# Patient Record
Sex: FEMALE | Race: WHITE | Employment: OTHER | URBAN - METROPOLITAN AREA
[De-identification: names, ages, dates, MRNs, and addresses within clinical notes are randomized per-mention and may not be internally consistent; named-entity substitution may affect disease eponyms.]

---

## 2017-07-10 ENCOUNTER — HOSPITAL ENCOUNTER (OUTPATIENT)
Dept: GENERAL RADIOLOGY | Age: 77
Discharge: HOME | End: 2017-07-10
Attending: INTERNAL MEDICINE | Admitting: INTERNAL MEDICINE

## 2017-07-10 DIAGNOSIS — J42 ACUTE EXACERBATION OF CHRONIC BRONCHITIS (HCC): ICD-10-CM

## 2017-07-10 DIAGNOSIS — J20.9 ACUTE EXACERBATION OF CHRONIC BRONCHITIS (HCC): ICD-10-CM

## 2017-09-06 ENCOUNTER — HOSPITAL ENCOUNTER (OUTPATIENT)
Dept: WOMENS IMAGING | Age: 77
Discharge: HOME | End: 2017-09-06
Attending: OBSTETRICS & GYNECOLOGY | Admitting: OBSTETRICS & GYNECOLOGY

## 2017-09-06 DIAGNOSIS — Z12.31 VISIT FOR SCREENING MAMMOGRAM: ICD-10-CM

## 2017-09-06 DIAGNOSIS — N95.9 MENOPAUSAL AND PERIMENOPAUSAL DISORDER: ICD-10-CM

## 2018-02-28 ENCOUNTER — HOSPITAL ENCOUNTER (OUTPATIENT)
Dept: GENERAL RADIOLOGY | Age: 78
Discharge: HOME | End: 2018-02-28
Attending: INTERNAL MEDICINE | Admitting: INTERNAL MEDICINE

## 2018-02-28 LAB
ALBUMIN SERPL-MCNC: 4.6 GM/DL (ref 3.4–5)
ALP BLD-CCNC: 70 IU/L (ref 40–129)
ALT SERPL-CCNC: 17 U/L (ref 10–40)
ANION GAP SERPL CALCULATED.3IONS-SCNC: 11 MMOL/L (ref 4–16)
AST SERPL-CCNC: 21 IU/L (ref 15–37)
BASOPHILS ABSOLUTE: 0 K/CU MM
BASOPHILS RELATIVE PERCENT: 0.4 % (ref 0–1)
BILIRUB SERPL-MCNC: 0.3 MG/DL (ref 0–1)
BUN BLDV-MCNC: 14 MG/DL (ref 6–23)
CALCIUM SERPL-MCNC: 10.1 MG/DL (ref 8.3–10.6)
CHLORIDE BLD-SCNC: 101 MMOL/L (ref 99–110)
CO2: 30 MMOL/L (ref 21–32)
CREAT SERPL-MCNC: 0.9 MG/DL (ref 0.6–1.1)
DIFFERENTIAL TYPE: ABNORMAL
EOSINOPHILS ABSOLUTE: 0.4 K/CU MM
EOSINOPHILS RELATIVE PERCENT: 4.4 % (ref 0–3)
ERYTHROCYTE SEDIMENTATION RATE: 10 MM/HR (ref 0–30)
ESTIMATED AVERAGE GLUCOSE: 128 MG/DL
GFR AFRICAN AMERICAN: >60 ML/MIN/1.73M2
GFR NON-AFRICAN AMERICAN: >60 ML/MIN/1.73M2
GLUCOSE FASTING: 101 MG/DL (ref 70–99)
HBA1C MFR BLD: 6.1 % (ref 4.2–6.3)
HCT VFR BLD CALC: 42.2 % (ref 37–47)
HEMOGLOBIN: 14.3 GM/DL (ref 12.5–16)
IMMATURE NEUTROPHIL %: 0.8 % (ref 0–0.43)
LYMPHOCYTES ABSOLUTE: 1.9 K/CU MM
LYMPHOCYTES RELATIVE PERCENT: 23.1 % (ref 24–44)
MCH RBC QN AUTO: 30.1 PG (ref 27–31)
MCHC RBC AUTO-ENTMCNC: 33.9 % (ref 32–36)
MCV RBC AUTO: 88.8 FL (ref 78–100)
MONOCYTES ABSOLUTE: 0.9 K/CU MM
MONOCYTES RELATIVE PERCENT: 10.5 % (ref 0–4)
NUCLEATED RBC %: 0 %
PDW BLD-RTO: 11.9 % (ref 11.7–14.9)
PLATELET # BLD: 258 K/CU MM (ref 140–440)
PMV BLD AUTO: 10.3 FL (ref 7.5–11.1)
POTASSIUM SERPL-SCNC: 4.3 MMOL/L (ref 3.5–5.1)
RBC # BLD: 4.75 M/CU MM (ref 4.2–5.4)
SEGMENTED NEUTROPHILS ABSOLUTE COUNT: 5 K/CU MM
SEGMENTED NEUTROPHILS RELATIVE PERCENT: 60.8 % (ref 36–66)
SODIUM BLD-SCNC: 142 MMOL/L (ref 135–145)
TOTAL IMMATURE NEUTOROPHIL: 0.07 K/CU MM
TOTAL NUCLEATED RBC: 0 K/CU MM
TOTAL PROTEIN: 7.2 GM/DL (ref 6.4–8.2)
WBC # BLD: 8.3 K/CU MM (ref 4–10.5)

## 2018-04-24 ENCOUNTER — HOSPITAL ENCOUNTER (OUTPATIENT)
Dept: GENERAL RADIOLOGY | Age: 78
Discharge: HOME | End: 2018-04-24
Attending: INTERNAL MEDICINE | Admitting: INTERNAL MEDICINE

## 2018-04-24 DIAGNOSIS — M79.606 PAIN OF LOWER EXTREMITY, UNSPECIFIED LATERALITY: ICD-10-CM

## 2018-04-24 DIAGNOSIS — M54.6 CHRONIC THORACIC BACK PAIN, UNSPECIFIED BACK PAIN LATERALITY: ICD-10-CM

## 2018-04-24 DIAGNOSIS — G89.29 CHRONIC THORACIC BACK PAIN, UNSPECIFIED BACK PAIN LATERALITY: ICD-10-CM

## 2018-11-08 ENCOUNTER — HOSPITAL ENCOUNTER (OUTPATIENT)
Dept: WOMENS IMAGING | Age: 78
Discharge: HOME | End: 2018-11-08
Payer: MEDICARE

## 2018-11-08 DIAGNOSIS — Z12.31 ENCOUNTER FOR SCREENING MAMMOGRAM FOR BREAST CANCER: ICD-10-CM

## 2018-11-08 PROCEDURE — 77067 SCR MAMMO BI INCL CAD: CPT

## 2020-01-29 ENCOUNTER — HOSPITAL ENCOUNTER (OUTPATIENT)
Age: 80
Discharge: HOME | End: 2020-01-29
Payer: MEDICARE

## 2020-01-29 LAB
ALBUMIN SERPL-MCNC: 4.5 GM/DL (ref 3.4–5)
ALP BLD-CCNC: 72 IU/L (ref 40–128)
ALT SERPL-CCNC: 16 U/L (ref 10–40)
ANION GAP SERPL CALCULATED.3IONS-SCNC: 14 MMOL/L (ref 4–16)
AST SERPL-CCNC: 22 IU/L (ref 15–37)
BASOPHILS ABSOLUTE: 0 K/CU MM
BASOPHILS RELATIVE PERCENT: 0.4 % (ref 0–1)
BILIRUB SERPL-MCNC: 0.3 MG/DL (ref 0–1)
BUN BLDV-MCNC: 12 MG/DL (ref 6–23)
C-REACTIVE PROTEIN, HIGH SENSITIVITY: 2 MG/L
CALCIUM SERPL-MCNC: 9.8 MG/DL (ref 8.3–10.6)
CHLORIDE BLD-SCNC: 96 MMOL/L (ref 99–110)
CHOLESTEROL: 218 MG/DL
CO2: 27 MMOL/L (ref 21–32)
CREAT SERPL-MCNC: 1 MG/DL (ref 0.6–1.1)
DIFFERENTIAL TYPE: ABNORMAL
EOSINOPHILS ABSOLUTE: 0.3 K/CU MM
EOSINOPHILS RELATIVE PERCENT: 3.3 % (ref 0–3)
ERYTHROCYTE SEDIMENTATION RATE: 14 MM/HR (ref 0–30)
ESTIMATED AVERAGE GLUCOSE: 126 MG/DL
GFR AFRICAN AMERICAN: >60 ML/MIN/1.73M2
GFR NON-AFRICAN AMERICAN: 53 ML/MIN/1.73M2
GLUCOSE BLD-MCNC: 105 MG/DL (ref 70–99)
HBA1C MFR BLD: 6 % (ref 4.2–6.3)
HCT VFR BLD CALC: 43.2 % (ref 37–47)
HDLC SERPL-MCNC: 53 MG/DL
HEMOGLOBIN: 13.9 GM/DL (ref 12.5–16)
IMMATURE NEUTROPHIL %: 0.4 % (ref 0–0.43)
LDL CHOLESTEROL DIRECT: 166 MG/DL
LYMPHOCYTES ABSOLUTE: 1.8 K/CU MM
LYMPHOCYTES RELATIVE PERCENT: 24.2 % (ref 24–44)
MCH RBC QN AUTO: 29.4 PG (ref 27–31)
MCHC RBC AUTO-ENTMCNC: 32.2 % (ref 32–36)
MCV RBC AUTO: 91.5 FL (ref 78–100)
MONOCYTES ABSOLUTE: 0.7 K/CU MM
MONOCYTES RELATIVE PERCENT: 9.6 % (ref 0–4)
NUCLEATED RBC %: 0 %
PDW BLD-RTO: 11.9 % (ref 11.7–14.9)
PLATELET # BLD: 263 K/CU MM (ref 140–440)
PMV BLD AUTO: 10.6 FL (ref 7.5–11.1)
POTASSIUM SERPL-SCNC: 4.3 MMOL/L (ref 3.5–5.1)
RBC # BLD: 4.72 M/CU MM (ref 4.2–5.4)
SEGMENTED NEUTROPHILS ABSOLUTE COUNT: 4.7 K/CU MM
SEGMENTED NEUTROPHILS RELATIVE PERCENT: 62.1 % (ref 36–66)
SODIUM BLD-SCNC: 137 MMOL/L (ref 135–145)
TOTAL IMMATURE NEUTOROPHIL: 0.03 K/CU MM
TOTAL NUCLEATED RBC: 0 K/CU MM
TOTAL PROTEIN: 7.1 GM/DL (ref 6.4–8.2)
TRIGL SERPL-MCNC: 81 MG/DL
TSH HIGH SENSITIVITY: 2.57 UIU/ML (ref 0.27–4.2)
VITAMIN D 25-HYDROXY: 60 NG/ML
WBC # BLD: 7.5 K/CU MM (ref 4–10.5)

## 2020-01-29 PROCEDURE — 86140 C-REACTIVE PROTEIN: CPT

## 2020-01-29 PROCEDURE — 80053 COMPREHEN METABOLIC PANEL: CPT

## 2020-01-29 PROCEDURE — 83036 HEMOGLOBIN GLYCOSYLATED A1C: CPT

## 2020-01-29 PROCEDURE — 36415 COLL VENOUS BLD VENIPUNCTURE: CPT

## 2020-01-29 PROCEDURE — 84443 ASSAY THYROID STIM HORMONE: CPT

## 2020-01-29 PROCEDURE — 85025 COMPLETE CBC W/AUTO DIFF WBC: CPT

## 2020-01-29 PROCEDURE — 82306 VITAMIN D 25 HYDROXY: CPT

## 2020-01-29 PROCEDURE — 85652 RBC SED RATE AUTOMATED: CPT

## 2020-01-29 PROCEDURE — 80061 LIPID PANEL: CPT

## 2020-01-29 PROCEDURE — 83721 ASSAY OF BLOOD LIPOPROTEIN: CPT

## 2021-03-30 ENCOUNTER — OFFICE VISIT (OUTPATIENT)
Dept: FAMILY MEDICINE CLINIC | Age: 81
End: 2021-03-30
Payer: MEDICARE

## 2021-03-30 VITALS
SYSTOLIC BLOOD PRESSURE: 137 MMHG | BODY MASS INDEX: 31.36 KG/M2 | OXYGEN SATURATION: 94 % | DIASTOLIC BLOOD PRESSURE: 77 MMHG | WEIGHT: 177 LBS | TEMPERATURE: 96.8 F | HEART RATE: 95 BPM | RESPIRATION RATE: 16 BRPM | HEIGHT: 63 IN

## 2021-03-30 DIAGNOSIS — N18.31 STAGE 3A CHRONIC KIDNEY DISEASE (HCC): ICD-10-CM

## 2021-03-30 DIAGNOSIS — F41.9 ANXIETY: ICD-10-CM

## 2021-03-30 DIAGNOSIS — E66.09 CLASS 1 OBESITY DUE TO EXCESS CALORIES WITHOUT SERIOUS COMORBIDITY IN ADULT, UNSPECIFIED BMI: ICD-10-CM

## 2021-03-30 DIAGNOSIS — Z76.89 ENCOUNTER TO ESTABLISH CARE: Primary | ICD-10-CM

## 2021-03-30 DIAGNOSIS — K21.9 GASTROESOPHAGEAL REFLUX DISEASE WITHOUT ESOPHAGITIS: ICD-10-CM

## 2021-03-30 DIAGNOSIS — M85.852 OSTEOPENIA OF LEFT HIP: ICD-10-CM

## 2021-03-30 DIAGNOSIS — R53.83 FATIGUE, UNSPECIFIED TYPE: ICD-10-CM

## 2021-03-30 DIAGNOSIS — I10 ESSENTIAL HYPERTENSION: ICD-10-CM

## 2021-03-30 DIAGNOSIS — R73.02 IMPAIRED GLUCOSE TOLERANCE: ICD-10-CM

## 2021-03-30 DIAGNOSIS — Z91.81 AT HIGH RISK FOR FALLS: ICD-10-CM

## 2021-03-30 DIAGNOSIS — G25.0 BENIGN ESSENTIAL TREMOR: ICD-10-CM

## 2021-03-30 PROBLEM — E66.811 CLASS 1 OBESITY DUE TO EXCESS CALORIES WITHOUT SERIOUS COMORBIDITY IN ADULT: Status: ACTIVE | Noted: 2021-03-30

## 2021-03-30 LAB
A/G RATIO: 1.6 (ref 1.1–2.2)
ALBUMIN SERPL-MCNC: 4.6 G/DL (ref 3.4–5)
ALP BLD-CCNC: 76 U/L (ref 40–129)
ALT SERPL-CCNC: 21 U/L (ref 10–40)
ANION GAP SERPL CALCULATED.3IONS-SCNC: 16 MMOL/L (ref 3–16)
AST SERPL-CCNC: 26 U/L (ref 15–37)
BASOPHILS ABSOLUTE: 0.1 K/UL (ref 0–0.2)
BASOPHILS RELATIVE PERCENT: 0.8 %
BILIRUB SERPL-MCNC: 0.4 MG/DL (ref 0–1)
BUN BLDV-MCNC: 16 MG/DL (ref 7–20)
CALCIUM SERPL-MCNC: 9.7 MG/DL (ref 8.3–10.6)
CHLORIDE BLD-SCNC: 98 MMOL/L (ref 99–110)
CHOLESTEROL, FASTING: 267 MG/DL (ref 0–199)
CO2: 25 MMOL/L (ref 21–32)
CREAT SERPL-MCNC: 1 MG/DL (ref 0.6–1.2)
EOSINOPHILS ABSOLUTE: 0.2 K/UL (ref 0–0.6)
EOSINOPHILS RELATIVE PERCENT: 2.3 %
GFR AFRICAN AMERICAN: >60
GFR NON-AFRICAN AMERICAN: 53
GLOBULIN: 2.9 G/DL
GLUCOSE BLD-MCNC: 105 MG/DL (ref 70–99)
HCT VFR BLD CALC: 43.2 % (ref 36–48)
HDLC SERPL-MCNC: 49 MG/DL (ref 40–60)
HEMOGLOBIN: 14.4 G/DL (ref 12–16)
LDL CHOLESTEROL CALCULATED: 201 MG/DL
LYMPHOCYTES ABSOLUTE: 1.8 K/UL (ref 1–5.1)
LYMPHOCYTES RELATIVE PERCENT: 22.6 %
MCH RBC QN AUTO: 29.4 PG (ref 26–34)
MCHC RBC AUTO-ENTMCNC: 33.3 G/DL (ref 31–36)
MCV RBC AUTO: 88.2 FL (ref 80–100)
MONOCYTES ABSOLUTE: 0.9 K/UL (ref 0–1.3)
MONOCYTES RELATIVE PERCENT: 11 %
NEUTROPHILS ABSOLUTE: 5 K/UL (ref 1.7–7.7)
NEUTROPHILS RELATIVE PERCENT: 63.3 %
PDW BLD-RTO: 13.4 % (ref 12.4–15.4)
PLATELET # BLD: 251 K/UL (ref 135–450)
PMV BLD AUTO: 9 FL (ref 5–10.5)
POTASSIUM SERPL-SCNC: 4.1 MMOL/L (ref 3.5–5.1)
RBC # BLD: 4.9 M/UL (ref 4–5.2)
SODIUM BLD-SCNC: 139 MMOL/L (ref 136–145)
TOTAL PROTEIN: 7.5 G/DL (ref 6.4–8.2)
TRIGLYCERIDE, FASTING: 86 MG/DL (ref 0–150)
TSH REFLEX FT4: 2.55 UIU/ML (ref 0.27–4.2)
VITAMIN D 25-HYDROXY: 61.5 NG/ML
VLDLC SERPL CALC-MCNC: 17 MG/DL
WBC # BLD: 7.9 K/UL (ref 4–11)

## 2021-03-30 PROCEDURE — 99214 OFFICE O/P EST MOD 30 MIN: CPT | Performed by: STUDENT IN AN ORGANIZED HEALTH CARE EDUCATION/TRAINING PROGRAM

## 2021-03-30 RX ORDER — SERTRALINE HYDROCHLORIDE 25 MG/1
25 TABLET, FILM COATED ORAL DAILY
Qty: 30 TABLET | Refills: 11 | Status: SHIPPED | OUTPATIENT
Start: 2021-03-30 | End: 2021-07-21 | Stop reason: SDUPTHER

## 2021-03-30 RX ORDER — CLOBETASOL PROPIONATE 0.5 MG/ML
LOTION TOPICAL 2 TIMES DAILY
COMMUNITY
End: 2022-02-18 | Stop reason: SDUPTHER

## 2021-03-30 RX ORDER — VALSARTAN 160 MG/1
160 TABLET ORAL DAILY
COMMUNITY
End: 2021-03-30

## 2021-03-30 RX ORDER — AMLODIPINE BESYLATE 5 MG/1
5 TABLET ORAL DAILY
Qty: 90 TABLET | Refills: 1 | Status: SHIPPED | OUTPATIENT
Start: 2021-03-30 | End: 2021-07-21 | Stop reason: SDUPTHER

## 2021-03-30 RX ORDER — AMLODIPINE BESYLATE 5 MG/1
5 TABLET ORAL DAILY
COMMUNITY
End: 2021-03-30 | Stop reason: SDUPTHER

## 2021-03-30 RX ORDER — VALSARTAN AND HYDROCHLOROTHIAZIDE 160; 12.5 MG/1; MG/1
1 TABLET, FILM COATED ORAL DAILY
Qty: 90 TABLET | Refills: 1 | Status: SHIPPED | OUTPATIENT
Start: 2021-03-30 | End: 2021-12-08

## 2021-03-30 SDOH — ECONOMIC STABILITY: TRANSPORTATION INSECURITY
IN THE PAST 12 MONTHS, HAS LACK OF TRANSPORTATION KEPT YOU FROM MEETINGS, WORK, OR FROM GETTING THINGS NEEDED FOR DAILY LIVING?: NO

## 2021-03-30 SDOH — ECONOMIC STABILITY: INCOME INSECURITY: HOW HARD IS IT FOR YOU TO PAY FOR THE VERY BASICS LIKE FOOD, HOUSING, MEDICAL CARE, AND HEATING?: NOT HARD AT ALL

## 2021-03-30 ASSESSMENT — ENCOUNTER SYMPTOMS
SORE THROAT: 0
WHEEZING: 0
PHOTOPHOBIA: 0
ABDOMINAL PAIN: 0
SHORTNESS OF BREATH: 0
NAUSEA: 0

## 2021-03-30 ASSESSMENT — PATIENT HEALTH QUESTIONNAIRE - PHQ9
1. LITTLE INTEREST OR PLEASURE IN DOING THINGS: 0
2. FEELING DOWN, DEPRESSED OR HOPELESS: 0
SUM OF ALL RESPONSES TO PHQ QUESTIONS 1-9: 0
SUM OF ALL RESPONSES TO PHQ QUESTIONS 1-9: 0
SUM OF ALL RESPONSES TO PHQ9 QUESTIONS 1 & 2: 0

## 2021-03-30 NOTE — PROGRESS NOTES
3/30/2021    Zelda Queen    Chief Complaint   Patient presents with    New Patient     Prev. Dr. Blaire Mckenzie pt. HPI  History was obtained from patient. Accompanied by   Linda Cheema is a 80 y.o. female with a PMHx as listed below who presents today to establish care. No acute complaints. Compliant with medications    Patient considering obtaining the covid vaccination but has some concerns about vaccination. Is concerned given her history of drug allergies. Denies any allergies with prior vaccinations. Hx. Bronchitis COPD rare use of albuterol inhaler less than once a month    Uses neti-pot regularly for sinuses    BP stable    Depression stable on current medications has been on zoloft for years     Hx. Of chronic arthritic pain in back. Patient has taking excedrin for in the past but makes her 'shaky'. Declines scheduled pain medications as concerned of dependence. 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 Hx. Of osteopenia  No hx. Of falls in the last year. Follows with Optometry Dr. Lizzeth Zhou. Patient due for lab work. Prior labs reviewed 1/2020, a1c 6.0 good control. Notes some difficulty in memory remembering names. No history of forgetting close loved ones names, missing medications or getting lost.        1. Encounter to establish care    2. Essential hypertension    3. Anxiety    4. Gastroesophageal reflux disease without esophagitis    5. Stage 3a chronic kidney disease    6. Osteopenia of left hip    7. Benign essential tremor    8. At high risk for falls    9. Class 1 obesity due to excess calories without serious comorbidity in adult, unspecified BMI    10. Fatigue, unspecified type    11. Impaired glucose tolerance             REVIEW OF SYMPTOMS    Review of Systems   Constitutional: Negative for chills and fatigue. HENT: Negative for congestion and sore throat. Eyes: Negative for photophobia and visual disturbance.    Respiratory: Negative for shortness of breath and wheezing. Cardiovascular: Negative for chest pain and palpitations. Gastrointestinal: Negative for abdominal pain and nausea. Genitourinary: Negative for frequency and urgency. Musculoskeletal: Positive for arthralgias (left hip). Right leg limp   Skin:        No changes in moles, new skin lesions   Neurological: Negative for light-headedness.         Right resting tremor (secondary to CP)       PAST MEDICAL HISTORY  Past Medical History:   Diagnosis Date    Anxiety     Cardiomegaly     Cerebral palsy (HCC)     Chronic bronchitis (HCC)     Compression fracture 2/2013    age of onset unknown    COPD [facility]ronic obstructive pulmonary disease) ([facility] [address]     Depression     Environmental allergies     Essential hypertension, benign 1985    white coat hypertension    Family history of tremor     GERD (gastroesophageal reflux disease)     Hyperlipemia     dx'd 1985    Impaired glucose tolerance     Impaired glucose tolerance     Lichen sclerosus     Mild mitral regurgitation     Mild tricuspid regurgitation     per 7/2014 echo    Moderate aortic insufficiency     Osteoarthritis of lumbar spine     Osteoarthritis of thoracic spine     Osteoporosis     Sleep apnea     Vertigo        FAMILY HISTORY  Family History   Problem Relation Age of Onset    Heart Disease Mother     Heart Disease Father     Diabetes Sister         type 2    Macular Degen Sister     Other Sister         macular degeneration    Cancer Brother         colon cancer    Cancer Daughter         thyroid cancer    Stroke Son        SOCIAL HISTORY  Social History     Socioeconomic History    Marital status:      Spouse name: None    Number of children: 2    Years of education: None    Highest education level: None   Occupational History    Occupation: unemployed   Social Needs    Financial resource strain: Not hard at all   [place] insecurity     Worry: Never true     Inability: Never true   Mohamud Howell CHEW Take 1 tablet by mouth daily.  calcium carbonate-vitamin D (CALTRATE 600+D) 600-400 MG-UNIT TABS per tab Take 1 tablet by mouth 2 times daily. 60 tablet 11    Multiple Vitamins-Minerals (VISION FORMULA) TABS Take  by mouth.  metFORMIN (GLUCOPHAGE) 500 MG tablet Take 1 tablet by mouth daily (with breakfast). (Patient not taking: Reported on 3/30/2021) 90 tablet 1    simvastatin (ZOCOR) 40 MG tablet Take 1 tablet by mouth nightly. (Patient not taking: Reported on 3/30/2021) 30 tablet 11    albuterol (PROVENTIL HFA;VENTOLIN HFA) 108 (90 BASE) MCG/ACT inhaler Inhale 2 puffs into the lungs every 6 hours as needed. (Patient not taking: Reported on 3/30/2021) 1 Inhaler 1     No current facility-administered medications for this visit. ALLERGIES  Allergies   Allergen Reactions    Inderal [Propranolol Hcl]     Tramadol Other (See Comments)     headache       PHYSICAL EXAM    /77   Pulse 95   Temp 96.8 °F (36 °C)   Resp 16   Ht 5' 3\" (1.6 m)   Wt 177 lb (80.3 kg)   SpO2 94%   BMI 31.35 kg/m²     Physical Exam  Constitutional:       Appearance: Normal appearance. HENT:      Head: Normocephalic and atraumatic. Mouth/Throat:      Mouth: Mucous membranes are moist.      Pharynx: No oropharyngeal exudate or posterior oropharyngeal erythema. Eyes:      Extraocular Movements: Extraocular movements intact. Pupils: Pupils are equal, round, and reactive to light. Neck:      Musculoskeletal: Normal range of motion and neck supple. Cardiovascular:      Rate and Rhythm: Normal rate and regular rhythm. Pulses: Normal pulses. Heart sounds: No murmur. No friction rub. No gallop. Pulmonary:      Effort: Pulmonary effort is normal.      Breath sounds: Normal breath sounds. No wheezing. Abdominal:      General: Abdomen is flat. Bowel sounds are normal. There is no distension. Tenderness: There is no abdominal tenderness. There is no guarding.    Musculoskeletal: General: No swelling or tenderness. Comments: Right limp  Thoracic kyphosis  M. Strength 5/5 LEft UE, Left LE  4/5 Right UE/LE   Lymphadenopathy:      Cervical: No cervical adenopathy. Skin:     General: Skin is warm and dry. Capillary Refill: Capillary refill takes less than 2 seconds. Findings: No lesion. Comments: No significant moles, lesions   Neurological:      General: No focal deficit present. Mental Status: She is alert. Psychiatric:         Mood and Affect: Mood normal.         Behavior: Behavior normal.         ASSESSMENT & PLAN    1. Encounter to establish care  -plan to get covid vaccination. Instruction pamphlet given  -f/u dexa  -f/u lab workup    2. Essential hypertension  -stable on current regimen  - CBC WITH AUTO DIFFERENTIAL; Future  - Comprehensive Metabolic Panel; Future  - amLODIPine (NORVASC) 5 MG tablet; Take 1 tablet by mouth daily  Dispense: 90 tablet; Refill: 1  - valsartan-hydroCHLOROthiazide (DIOVAN HCT) 160-12.5 MG per tablet; Take 1 tablet by mouth daily  Dispense: 90 tablet; Refill: 1    3. Anxiety  -stable  - sertraline (ZOLOFT) 25 MG tablet; Take 1 tablet by mouth daily  Dispense: 30 tablet; Refill: 11    4. Gastroesophageal reflux disease without esophagitis  -stable currently not on medications    5. Stage 3a chronic kidney disease  -continue to monitor, continue arb  -microalbumin/cr ratio needed with next appointment    6. Osteopenia of left hip  -stable  -no history of falls in the past year  - DEXA BONE DENSITY AXIAL SKELETON; Future  - VITAMIN D 25 HYDROXY; Future    7. Benign essential tremor  -Familial tremor. Continue bb    8. At high risk for falls  -instructed to use walker at all times.   -guard rails placed in home. 9. Class 1 obesity due to excess calories without serious comorbidity in adult, unspecified BMI  -counseled on diet/lifestyle  - Lipid, Fasting; Future  - Hemoglobin A1C; Future    10.  Fatigue, unspecified type  -f/u labs  - TSH WITH REFLEX TO FT4; Future    11. Impaired glucose tolerance  - Hemoglobin A1C; Future      Return in about 3 months (around 6/30/2021) for awv, dm.          Electronically signed by Shana Regan DO on 3/30/2021

## 2021-03-31 ENCOUNTER — TELEPHONE (OUTPATIENT)
Dept: FAMILY MEDICINE CLINIC | Age: 81
End: 2021-03-31

## 2021-03-31 LAB
ESTIMATED AVERAGE GLUCOSE: 128.4 MG/DL
HBA1C MFR BLD: 6.1 %

## 2021-03-31 NOTE — TELEPHONE ENCOUNTER
Spoke to Topher and verified she is to be taking amlodipine 5mg. Dr. Judi Parekh did send in rx for it. It may not be time to fill as she has some left from Dr. Alton Bernal. I told her it is ok to take them as long as they are also 5mg. She verified on bottle that they are 5mg.

## 2021-03-31 NOTE — TELEPHONE ENCOUNTER
Spoke with patient. Elevated cholesterol levels. Ok to restart simvastatin. Was discontinued from prior PCP however patient denies adverse effect from medication including m. Aches.

## 2021-04-14 DIAGNOSIS — E78.5 HYPERLIPIDEMIA: ICD-10-CM

## 2021-04-19 RX ORDER — SIMVASTATIN 40 MG
40 TABLET ORAL NIGHTLY
Qty: 90 TABLET | Refills: 1 | Status: SHIPPED | OUTPATIENT
Start: 2021-04-19 | End: 2022-07-06

## 2021-05-07 ENCOUNTER — TELEPHONE (OUTPATIENT)
Dept: FAMILY MEDICINE CLINIC | Age: 81
End: 2021-05-07

## 2021-05-07 DIAGNOSIS — E78.5 HYPERLIPIDEMIA, UNSPECIFIED HYPERLIPIDEMIA TYPE: Primary | ICD-10-CM

## 2021-05-07 RX ORDER — EZETIMIBE 10 MG/1
10 TABLET ORAL DAILY
Qty: 30 TABLET | Refills: 2 | Status: SHIPPED | OUTPATIENT
Start: 2021-05-07 | End: 2021-07-27

## 2021-05-07 NOTE — TELEPHONE ENCOUNTER
Addressed statin added to drug allergy list. Start on zetia, discussed increased risk cardiovascular disease

## 2021-05-21 ENCOUNTER — HOSPITAL ENCOUNTER (OUTPATIENT)
Dept: WOMENS IMAGING | Age: 81
Discharge: HOME | End: 2021-05-21
Payer: MEDICARE

## 2021-05-21 DIAGNOSIS — M85.852 OSTEOPENIA OF LEFT HIP: ICD-10-CM

## 2021-05-21 PROCEDURE — 77080 DXA BONE DENSITY AXIAL: CPT

## 2021-06-02 ENCOUNTER — VIRTUAL VISIT (OUTPATIENT)
Dept: FAMILY MEDICINE CLINIC | Age: 81
End: 2021-06-02
Payer: MEDICARE

## 2021-06-02 DIAGNOSIS — Z00.00 ROUTINE GENERAL MEDICAL EXAMINATION AT A HEALTH CARE FACILITY: Primary | ICD-10-CM

## 2021-06-02 PROCEDURE — G0438 PPPS, INITIAL VISIT: HCPCS | Performed by: STUDENT IN AN ORGANIZED HEALTH CARE EDUCATION/TRAINING PROGRAM

## 2021-06-02 ASSESSMENT — PATIENT HEALTH QUESTIONNAIRE - PHQ9
SUM OF ALL RESPONSES TO PHQ QUESTIONS 1-9: 0
2. FEELING DOWN, DEPRESSED OR HOPELESS: 0
SUM OF ALL RESPONSES TO PHQ9 QUESTIONS 1 & 2: 0
1. LITTLE INTEREST OR PLEASURE IN DOING THINGS: 0

## 2021-06-02 ASSESSMENT — LIFESTYLE VARIABLES: HOW OFTEN DO YOU HAVE A DRINK CONTAINING ALCOHOL: 0

## 2021-06-02 NOTE — PATIENT INSTRUCTIONS
Personalized Preventive Plan for Chevy Best - 6/2/2021  Medicare offers a range of preventive health benefits. Some of the tests and screenings are paid in full while other may be subject to a deductible, co-insurance, and/or copay. Some of these benefits include a comprehensive review of your medical history including lifestyle, illnesses that may run in your family, and various assessments and screenings as appropriate. After reviewing your medical record and screening and assessments performed today your provider may have ordered immunizations, labs, imaging, and/or referrals for you. A list of these orders (if applicable) as well as your Preventive Care list are included within your After Visit Summary for your review. Other Preventive Recommendations:    · A preventive eye exam performed by an eye specialist is recommended every 1-2 years to screen for glaucoma; cataracts, macular degeneration, and other eye disorders. · A preventive dental visit is recommended every 6 months. · Try to get at least 150 minutes of exercise per week or 10,000 steps per day on a pedometer . · Order or download the FREE \"Exercise & Physical Activity: Your Everyday Guide\" from [employer]. Call 8-443.507.8840 or search [employer] online. · You need 5424-4704 mg of calcium and 9739-0387 IU of vitamin D per day. It is possible to meet your calcium requirement with diet alone, but a vitamin D supplement is usually necessary to meet this goal.  · When exposed to the sun, use a sunscreen that protects against both UVA and UVB radiation with an SPF of 30 or greater. Reapply every 2 to 3 hours or after sweating, drying off with a towel, or swimming. · Always wear a seat belt when traveling in a car. Always wear a helmet when riding a bicycle or motorcycle.

## 2021-06-02 NOTE — PROGRESS NOTES
Medicare Annual Wellness Visit  Name: Loney Duverney Date: 2021   MRN: [id number] Sex: Female   Age: 80 y.o. Ethnicity: Non-/Non    : 1940 Race: Anshu Jesus is here for Medicare AWV    Screenings for behavioral, psychosocial and functional/safety risks, and cognitive dysfunction are all negative except as indicated below. These results, as well as other patient data from the [address] form, are documented in Flowsheets linked to this Encounter. Allergies   Allergen Reactions    Inderal [Propranolol Hcl]     Statins      Muscle aches    Tramadol Other (See Comments)     headache       Prior to Visit Medications    Medication Sig Taking? Authorizing Provider   ezetimibe (ZETIA) 10 MG tablet Take 1 tablet by mouth daily Yes Jone Griffin DO   clobetasol propionate 0.05 % LOTN lotion Apply topically 2 times daily Yes Historical Provider, MD   sertraline (ZOLOFT) 25 MG tablet Take 1 tablet by mouth daily Yes Jone Griffin DO   amLODIPine (NORVASC) 5 MG tablet Take 1 tablet by mouth daily Yes Jone Griffin DO   valsartan-hydroCHLOROthiazide (DIOVAN HCT) 160-12.5 MG per tablet Take 1 tablet by mouth daily Yes Jone Griffin DO   meclizine (ANTIVERT) 25 MG tablet Take 1 tablet by mouth 4 times daily as needed Yes Alexis Lao MD   LORazepam (ATIVAN) 0.5 MG tablet Take 1 tablet by mouth daily as needed Yes Alexis Lao MD   atenolol (TENORMIN) 50 MG tablet Take 1 tablet by mouth 2 times daily. Patient taking differently: Take 25 mg by mouth 2 times daily  Yes Alexis Lao MD   Multiple Vitamins-Calcium (VIACTIV MULTI-VITAMIN) CHEW Take 1 tablet by mouth daily. Yes Historical Provider, MD   calcium carbonate-vitamin D (CALTRATE 600+D) 600-400 MG-UNIT TABS per tab Take 1 tablet by mouth 2 times daily.  Yes Alexis Lao MD   simvastatin (ZOCOR) 40 MG tablet Take 1 tablet by mouth nightly  Patient not taking: Reported on 2021  Jone Griffin thyroid cancer    Stroke Son     No Known Problems Brother     Cancer Brother         prostate    Diabetes Sister     Diabetes Sister        [place] (Including outside providers/suppliers regularly involved in providing care):   Patient Care Team:  Jeniffer Lechuga DO as PCP - General (Family Medicine)  Jeniffer Lechuga DO as PCP - REHABILITATION [facility] Empaneled Provider  Diamond Leal MD as Consulting Physician (Gastroenterology)  Christopher Cesar DO as Consulting Physician (Obstetrics & Gynecology)    [facility] Readings from Last 3 Encounters:   03/30/21 177 lb (80.3 kg)   07/14/15 169 lb (76.7 kg)   05/07/15 170 lb 3.2 oz (77.2 kg)     There were no vitals filed for this visit. There is no height or weight on file to calculate BMI. Based upon direct observation of the patient, evaluation of cognition reveals recent and remote memory intact. Patient's complete Health Risk Assessment and screening values have been reviewed and are found in Flowsheets. The following problems were reviewed today and where indicated follow up appointments were made and/or referrals ordered. Positive Risk Factor Screenings with Interventions:          General Health and ACP:  General  In general, how would you say your health is?: (!) Poor (pt states unable to be as active as she used to be)  In the past 7 days, have you experienced any of the following?  New or Increased Pain, New or Increased Fatigue, Loneliness, Social Isolation, Stress or Anger?: None of These  Do you get the social and emotional support that you need?: Yes  Do you have a Living Will?: (!) No  Advance Directives     Power of [address] [place] [address] Will ACP-Advance Directive ACP-Power of     Not on File Not on File Not on File Not on File      General Health Risk Interventions:  · Poor self-assessment of health status: Patient states she is no longer able to be as active as she used to be  · No Living Will: Advance Care Planning addressed with patient today    Health Habits/Nutrition:  Health Habits/Nutrition  Do you exercise for at least 20 minutes 2-3 times per week?: Yes  Have you lost any weight without trying in the past 3 months?: No  Do you eat only one meal per day?: No  Have you seen the dentist within the past year?: N/A - wear dentures     Health Habits/Nutrition Interventions:  · Nutritional issues:  patient is not ready to address his/her nutritional/weight issues at this time    Hearing/Vision:  No exam data present  Hearing/Vision  Do you or your family notice any trouble with your hearing that hasn't been managed with hearing aids?: No  Do you have difficulty driving, watching TV, or doing any of your daily activities because of your eyesight?: No  Have you had an eye exam within the past year?: (!) No  Hearing/Vision Interventions:  · Vision concerns:  patient encouraged to make appointment with his/her eye specialist     ADL:  ADLs  In the past 7 days, did you need help from others to perform any of the following everyday activities? Eating, dressing, grooming, bathing, toileting, or walking/balance?: (!) Walking/Balance  In the past 7 days, did you need help from others to take care of any of the following? Laundry, housekeeping, banking/finances, shopping, telephone use, food preparation, transportation, or taking medications?: (!) Transportation  ADL Interventions:  · Patient declines any further evaluation/treatment for this issue, states she does have a walker, and her  will help her stay steady at times. She states her  drives her where she needs to go.     Personalized Preventive Plan   Current Health Maintenance Status  Immunization History   Administered Date(s) Administered    Influenza A ([id number]) Vaccine IM 12/15/2009    Influenza Virus Vaccine 10/05/2009, 10/19/2010, 09/27/2011    Influenza, High Dose (Fluzone 65 yrs and older) 09/11/2012, 10/22/2013, 09/02/2014    Pneumococcal Polysaccharide ([id number]) 08/13/2012    Zoster Live (Zostavax) 12/04/2012        Health Maintenance   Topic Date Due    COVID-19 Vaccine (1) Never done    DTaP/Tdap/Td vaccine (1 - Tdap) Never done    Shingles Vaccine (2 of 3) 01/29/2013    Annual Wellness Visit (AWV)  Never done    Flu vaccine (Season Ended) 09/01/2021    Lipid screen  03/30/2022    Potassium monitoring  03/30/2022    Creatinine monitoring  03/30/2022    DEXA (modify frequency per FRAX score)  Completed    Pneumococcal 65+ years Vaccine  Completed    Hepatitis A vaccine  Aged Out    Hepatitis B vaccine  Aged Out    Hib vaccine  Aged Out    Meningococcal (ACWY) vaccine  Aged Out     Recommendations for "[employer] Due: see orders and patient instructions/AVS. Discussed vaccination needs, patient will talk with PCP at next visit on 6/30/21. Patient states she refuses COVID vaccinations. Unable to obtain 3 vital signs due to patient not having equipment to take blood pressure/temperature. Recommended screening schedule for the next 5-10 years is provided to the patient in written form: see Patient Instructions/AVS.    Hemanth SPENCE LPN, [birth year], performed the documented evaluation under the direct supervision of the attending physician. Justino Ortiz, was evaluated through a synchronous (real-time) audio-video encounter. The patient (or guardian if applicable) is aware that this is a billable service. Verbal consent to proceed has been obtained within the past 12 months. The visit was conducted pursuant to the emergency declaration under the [address] [place] [address] authority and the [employer] and [employer] General Act. Patient identification was verified, and a caregiver was present when appropriate. The patient was located in the state of [place], where the provider was credentialed to provide care.     Total time spent for this encounter: Not billed by time    --Hemanth Love LPN on 6/2/2021 at 3:10 PM    An electronic signature was used to authenticate this note.

## 2021-06-24 DIAGNOSIS — F41.9 ANXIETY: ICD-10-CM

## 2021-06-28 RX ORDER — LORAZEPAM 0.5 MG/1
0.5 TABLET ORAL DAILY PRN
Qty: 20 TABLET | Refills: 0 | Status: SHIPPED | OUTPATIENT
Start: 2021-06-28 | End: 2021-07-21 | Stop reason: SDUPTHER

## 2021-06-30 ENCOUNTER — TELEPHONE (OUTPATIENT)
Dept: FAMILY MEDICINE CLINIC | Age: 81
End: 2021-06-30

## 2021-06-30 ENCOUNTER — OFFICE VISIT (OUTPATIENT)
Dept: FAMILY MEDICINE CLINIC | Age: 81
End: 2021-06-30
Payer: MEDICARE

## 2021-06-30 VITALS
RESPIRATION RATE: 16 BRPM | HEIGHT: 63 IN | WEIGHT: 174 LBS | HEART RATE: 56 BPM | BODY MASS INDEX: 30.83 KG/M2 | OXYGEN SATURATION: 96 % | DIASTOLIC BLOOD PRESSURE: 79 MMHG | SYSTOLIC BLOOD PRESSURE: 129 MMHG

## 2021-06-30 DIAGNOSIS — M85.852 OSTEOPENIA OF LEFT HIP: ICD-10-CM

## 2021-06-30 DIAGNOSIS — K21.9 GASTROESOPHAGEAL REFLUX DISEASE WITHOUT ESOPHAGITIS: ICD-10-CM

## 2021-06-30 DIAGNOSIS — N39.46 MIXED STRESS AND URGE INCONTINENCE: ICD-10-CM

## 2021-06-30 DIAGNOSIS — N18.31 STAGE 3A CHRONIC KIDNEY DISEASE (HCC): ICD-10-CM

## 2021-06-30 DIAGNOSIS — E78.2 MIXED HYPERLIPIDEMIA: ICD-10-CM

## 2021-06-30 DIAGNOSIS — N39.46 MIXED STRESS AND URGE INCONTINENCE: Primary | ICD-10-CM

## 2021-06-30 DIAGNOSIS — J42 CHRONIC BRONCHITIS, UNSPECIFIED CHRONIC BRONCHITIS TYPE (HCC): ICD-10-CM

## 2021-06-30 DIAGNOSIS — R73.02 IMPAIRED GLUCOSE TOLERANCE: ICD-10-CM

## 2021-06-30 DIAGNOSIS — F41.9 ANXIETY: ICD-10-CM

## 2021-06-30 DIAGNOSIS — I10 ESSENTIAL HYPERTENSION: Primary | ICD-10-CM

## 2021-06-30 DIAGNOSIS — J44.9 CHRONIC OBSTRUCTIVE PULMONARY DISEASE, UNSPECIFIED COPD TYPE (HCC): ICD-10-CM

## 2021-06-30 PROCEDURE — 99214 OFFICE O/P EST MOD 30 MIN: CPT | Performed by: STUDENT IN AN ORGANIZED HEALTH CARE EDUCATION/TRAINING PROGRAM

## 2021-06-30 RX ORDER — OXYBUTYNIN CHLORIDE 10 MG/1
10 TABLET, EXTENDED RELEASE ORAL DAILY
Qty: 30 TABLET | Refills: 2 | Status: SHIPPED | OUTPATIENT
Start: 2021-06-30 | End: 2021-09-27 | Stop reason: SDUPTHER

## 2021-06-30 ASSESSMENT — ENCOUNTER SYMPTOMS
ABDOMINAL PAIN: 0
WHEEZING: 0
SORE THROAT: 0
NAUSEA: 0
SHORTNESS OF BREATH: 0

## 2021-06-30 NOTE — PROGRESS NOTES
6/30/2021    Gretel Hernandez    Chief Complaint   Patient presents with    3 Month Follow-Up     Presenting for three month follow up visit.  Swelling     Ankles swelling  at times. HPI  History was obtained from patient. Tamra Payne is a 80 y.o. female with a PMHx as listed below who presents today for 3 month follow up on chronic conditions. No acute complaints. Compliant with medications. pt reports increased BL swelling at the end of the day. HTN is well managed with Diovan and Atenolol. Anxiety is managed with Lorazepam daily- she has not had one for the last 3-4 days. Tolerating Zoloft well. No concerns. Pt is tolerating Zetia well, denies side effects. Denies GI upset, N/V/D. Pt reports urinary incontinence that is worse at night time, she states that she was previously on a medication for bladder spasms that she took as needed but she doesn't remember the name. 1. Essential hypertension    2. Chronic bronchitis, unspecified chronic bronchitis type ([address]    3. Gastroesophageal reflux disease without esophagitis    4. Impaired glucose tolerance    5. Stage 3a chronic kidney disease ([address]    6. Chronic obstructive pulmonary disease, unspecified COPD type ([address]    7. Mixed stress and urge incontinence    8. Mixed hyperlipidemia    9. Anxiety    10. Osteopenia of left hip             REVIEW OF SYMPTOMS    Review of Systems   Constitutional: Negative for chills and fatigue. HENT: Negative for congestion and sore throat. Respiratory: Negative for shortness of breath and wheezing. Cardiovascular: Negative for chest pain and palpitations. Gastrointestinal: Negative for abdominal pain and nausea. Genitourinary: Negative for frequency and urgency. Neurological: Negative for light-headedness.        PAST MEDICAL HISTORY  Past Medical History:   Diagnosis Date    Anxiety     Cardiomegaly     Cerebral palsy ([address]     Chronic bronchitis ([address]     Compression fracture 2/2013 age of onset unknown    COPD [facility]chronic obstructive [facility] disease) [facility]     Depression     Environmental allergies     Essential hypertension, benign 1985    white coat hypertension    Family history of tremor     GERD (gastroesophageal reflux disease)     Hyperlipemia     dx'd 1985    Impaired glucose tolerance     Impaired glucose tolerance     Lichen sclerosus     Mild mitral regurgitation     Mild tricuspid regurgitation     per 7/2014 echo    Moderate aortic insufficiency     Osteoarthritis of lumbar spine     Osteoarthritis of thoracic spine     Osteoporosis     Sleep apnea     Vertigo        FAMILY HISTORY  Family History   Problem Relation Age of Onset    Cancer Brother         colon cancer    Other Brother         meningitis    Heart Disease Mother     Heart Disease Father     Heart Attack Father     Diabetes Sister         type 2    Macular Degen Sister     Other Sister         macular degeneration    Heart Disease Sister     Cancer Daughter         thyroid cancer    Stroke Son     No Known Problems Brother     Cancer Brother         prostate    Diabetes Sister     Diabetes Sister        SOCIAL HISTORY  Social History     Socioeconomic History    Marital status:      Spouse name: None    Number of children: 2    Years of education: None    Highest education level: None   Occupational History    Occupation: unemployed   Tobacco Use    Smoking status: Never Smoker    Smokeless tobacco: Never Used   Substance and Sexual Activity    Alcohol use: None    Drug use: None    Sexual activity: None   Other Topics Concern    None   Social History Narrative    None     Social Determinants of Health     Financial Resource Strain: Low Risk     Difficulty of Paying Living Expenses: Not hard at all   Food Insecurity: No Food Insecurity    Worried About Running Out of Food in the Last Year: Never true    Olu of Food in the Last Year: Never true   Transportation Needs: No Transportation Needs    Lack of Transportation (Medical): No    Lack of Transportation (Non-Medical): No   Physical Activity:     Days of Exercise per Week:     Minutes of Exercise per Session:    Stress:     Feeling of Stress :    Social Connections:     Frequency of Communication with Friends and Family:     Frequency of Social Gatherings with Friends and Family:     Attends Tenri[employer] Services:     Active Member of Clubs or Organizations:     Attends Club or Organization Meetings:     Marital Status:    Intimate Partner Violence:     Fear of Current or Ex-Partner:     Emotionally Abused:     Physically Abused:     Sexually Abused:         SURGICAL HISTORY  Past Surgical History:   Procedure Laterality Date    CERVIX BIOPSY  03/2011    cone biopsy    CHOLECYSTECTOMY      COLONOSCOPY  6/2012    DILATION AND CURETTAGE OF UTERUS                   CURRENT MEDICATIONS  Current Outpatient Medications   Medication Sig Dispense Refill    mirabegron (MYRBETRIQ) 25 MG TB24 Take 1 tablet by mouth daily 30 tablet 2    LORazepam (ATIVAN) 0.5 MG tablet Take 1 tablet by mouth daily as needed for Anxiety for up to 30 days. 20 tablet 0    ezetimibe (ZETIA) 10 MG tablet Take 1 tablet by mouth daily 30 tablet 2    clobetasol propionate 0.05 % LOTN lotion Apply topically 2 times daily      sertraline (ZOLOFT) 25 MG tablet Take 1 tablet by mouth daily 30 tablet 11    valsartan-hydroCHLOROthiazide (DIOVAN HCT) 160-12.5 MG per tablet Take 1 tablet by mouth daily 90 tablet 1    meclizine (ANTIVERT) 25 MG tablet Take 1 tablet by mouth 4 times daily as needed 30 tablet 1    atenolol (TENORMIN) 50 MG tablet Take 1 tablet by mouth 2 times daily. (Patient taking differently: Take 25 mg by mouth 2 times daily ) 180 tablet 3    Multiple Vitamins-Calcium (VIACTIV MULTI-VITAMIN) CHEW Take 1 tablet by mouth daily.       calcium carbonate-vitamin D (CALTRATE 600+D) 600-400 MG-UNIT TABS per tab Take 1 tablet by mouth 2 times daily. 60 tablet 11    simvastatin (ZOCOR) 40 MG tablet Take 1 tablet by mouth nightly (Patient not taking: Reported on 6/2/2021) 90 tablet 1    amLODIPine (NORVASC) 5 MG tablet Take 1 tablet by mouth daily 90 tablet 1    metFORMIN (GLUCOPHAGE) 500 MG tablet Take 1 tablet by mouth daily (with breakfast). (Patient not taking: Reported on 3/30/2021) 90 tablet 1    loratadine (CLARITIN) 10 MG tablet Take 1 tablet by mouth daily. (Patient not taking: Reported on 6/2/2021) 30 tablet 11    albuterol (PROVENTIL HFA;VENTOLIN HFA) 108 (90 BASE) MCG/ACT inhaler Inhale 2 puffs into the lungs every 6 hours as needed. (Patient not taking: Reported on 3/30/2021) 1 Inhaler 1    Multiple Vitamins-Minerals (VISION FORMULA) TABS Take  by mouth. (Patient not taking: Reported on 6/2/2021)       No current facility-administered medications for this visit. ALLERGIES  Allergies   Allergen Reactions    Inderal [Propranolol Hcl]     Statins      Muscle aches    Tramadol Other (See Comments)     headache       PHYSICAL EXAM    /79   Pulse 56   Resp 16   Ht 5' 3\" (1.6 m)   Wt 174 lb (78.9 kg)   SpO2 96%   BMI 30.82 kg/m²     Physical Exam  Constitutional:       Appearance: Normal appearance. HENT:      Head: Normocephalic and atraumatic. Eyes:      Extraocular Movements: Extraocular movements intact. Pupils: Pupils are equal, round, and reactive to light. Cardiovascular:      Rate and Rhythm: Normal rate and regular rhythm. Pulses: Normal pulses. Heart sounds: No murmur heard. No friction rub. No gallop. Comments: 1+ pitting edema up to low ankles b/l   Pulmonary:      Effort: Pulmonary effort is normal.      Breath sounds: Normal breath sounds. Musculoskeletal:      Comments: walker   Skin:     General: Skin is warm and dry. Neurological:      General: No focal deficit present. Mental Status: She is alert.    Psychiatric:         Mood and Affect: Mood normal. Behavior: Behavior normal.         ASSESSMENT & PLAN    1. Essential hypertension  -stable current regimen stevenson mild pitting edema however not effecting her    2. Chronic bronchitis, unspecified chronic bronchitis type ([facility]  -stable without use of medications, no copd exacerbations    3. Gastroesophageal reflux disease without esophagitis  -stable    4. Impaired glucose tolerance  -due for labs prior 6.1    5. Stage 3a chronic kidney disease (H[facility])  -stable    6. Chronic obstructive pulmonary disease, unspecified COPD type ([facility]  -as above    7. Mixed stress and urge incontinence  -start trial of myrbetriq  - mirabegron (MYRBETRIQ) 25 MG TB24; Take 1 tablet by mouth daily  Dispense: 30 tablet; Refill: 2    8. Mixed hyperlipidemia  -f/u labs tolerating zetia  - Lipid, Fasting; Future  - ALDOLASE; Future  - CK; Future    9. Anxiety  -stable    10. Osteopenia of left hip  -continue calcium and vitamin D as seen 5/21/21 DEXA  -continue to monitor    Return in about 2 months (around 8/30/2021) for hld, anxiety.              Electronically signed by Joo Zavaleta DO on 6/30/2021

## 2021-06-30 NOTE — TELEPHONE ENCOUNTER
Patient called and stated that the medication mirabegron sent to pharmacy today was   $ 100.00 for 30 pills. Patient can not afford this. Is there anything else she could try.  Call patient and advise

## 2021-07-20 DIAGNOSIS — F41.9 ANXIETY: ICD-10-CM

## 2021-07-20 DIAGNOSIS — I10 HTN (HYPERTENSION): ICD-10-CM

## 2021-07-20 DIAGNOSIS — I10 ESSENTIAL HYPERTENSION: ICD-10-CM

## 2021-07-20 NOTE — TELEPHONE ENCOUNTER
LV-06/30  NV-08/30  [facility]     PT STATES SHE DOES NOT HAVE SOME OF HER MEDICATION LEFT. [facility]S. SHE IS FRANTIC ABOUT HER MEDICATIONS. SHE SAID THE PHARM DID NOT TREAT HER WELL.

## 2021-07-21 ENCOUNTER — TELEPHONE (OUTPATIENT)
Dept: FAMILY MEDICINE CLINIC | Age: 81
End: 2021-07-21

## 2021-07-21 RX ORDER — ATENOLOL 50 MG/1
25 TABLET ORAL 2 TIMES DAILY
Qty: 180 TABLET | Refills: 1 | Status: SHIPPED | OUTPATIENT
Start: 2021-07-21 | End: 2022-02-17 | Stop reason: SDUPTHER

## 2021-07-21 RX ORDER — SERTRALINE HYDROCHLORIDE 25 MG/1
25 TABLET, FILM COATED ORAL DAILY
Qty: 90 TABLET | Refills: 1 | Status: SHIPPED | OUTPATIENT
Start: 2021-07-21 | End: 2021-08-20 | Stop reason: SDUPTHER

## 2021-07-21 RX ORDER — LORAZEPAM 0.5 MG/1
0.5 TABLET ORAL DAILY PRN
Qty: 20 TABLET | Refills: 0 | Status: SHIPPED | OUTPATIENT
Start: 2021-07-21 | End: 2021-08-26 | Stop reason: SDUPTHER

## 2021-07-21 RX ORDER — AMLODIPINE BESYLATE 5 MG/1
5 TABLET ORAL DAILY
Qty: 90 TABLET | Refills: 1 | Status: SHIPPED | OUTPATIENT
Start: 2021-07-21 | End: 2021-08-30 | Stop reason: SINTOL

## 2021-07-27 DIAGNOSIS — E78.5 HYPERLIPIDEMIA, UNSPECIFIED HYPERLIPIDEMIA TYPE: ICD-10-CM

## 2021-07-27 RX ORDER — EZETIMIBE 10 MG/1
TABLET ORAL
Qty: 30 TABLET | Refills: 0 | Status: SHIPPED | OUTPATIENT
Start: 2021-07-27 | End: 2021-07-28 | Stop reason: SDUPTHER

## 2021-07-28 DIAGNOSIS — E78.5 HYPERLIPIDEMIA, UNSPECIFIED HYPERLIPIDEMIA TYPE: ICD-10-CM

## 2021-07-28 NOTE — TELEPHONE ENCOUNTER
PT HAD HER SCRIPT FOR HER Ezetimibe 10 MG    SENT TO [facility]. WHEN PT WENT TO  HER MEDICATION THEY TOLD HER IT WAS GOING TO BE 77.00. ALSO THAT SHE NEEDED TO SEND IT TO [facility]. PT W[facility].

## 2021-07-30 RX ORDER — EZETIMIBE 10 MG/1
TABLET ORAL
Qty: 90 TABLET | Refills: 1 | Status: SHIPPED | OUTPATIENT
Start: 2021-07-30 | End: 2021-08-02 | Stop reason: SDUPTHER

## 2021-08-02 DIAGNOSIS — E78.5 HYPERLIPIDEMIA, UNSPECIFIED HYPERLIPIDEMIA TYPE: ICD-10-CM

## 2021-08-02 RX ORDER — EZETIMIBE 10 MG/1
TABLET ORAL
Qty: 90 TABLET | Refills: 1 | Status: SHIPPED | OUTPATIENT
Start: 2021-08-02 | End: 2022-03-30

## 2021-08-20 DIAGNOSIS — F41.9 ANXIETY: ICD-10-CM

## 2021-08-20 RX ORDER — SERTRALINE HYDROCHLORIDE 25 MG/1
25 TABLET, FILM COATED ORAL DAILY
Qty: 90 TABLET | Refills: 1 | Status: SHIPPED | OUTPATIENT
Start: 2021-08-20 | End: 2022-02-18 | Stop reason: SDUPTHER

## 2021-08-20 NOTE — TELEPHONE ENCOUNTER
LV 6/30/21    NV  08/30/21    [facility]      Please note: The prescription for Sertraline was sent in on 7/21/21, but the patient has not been able to  because her [facility] now [facility].

## 2021-08-26 DIAGNOSIS — F41.9 ANXIETY: ICD-10-CM

## 2021-08-26 RX ORDER — LORAZEPAM 0.5 MG/1
0.5 TABLET ORAL DAILY PRN
Qty: 20 TABLET | Refills: 0 | Status: SHIPPED | OUTPATIENT
Start: 2021-08-26 | End: 2021-08-30 | Stop reason: SDUPTHER

## 2021-08-30 ENCOUNTER — OFFICE VISIT (OUTPATIENT)
Dept: FAMILY MEDICINE CLINIC | Age: 81
End: 2021-08-30
Payer: MEDICARE

## 2021-08-30 VITALS
HEIGHT: 63 IN | BODY MASS INDEX: 29.23 KG/M2 | DIASTOLIC BLOOD PRESSURE: 79 MMHG | OXYGEN SATURATION: 95 % | WEIGHT: 165 LBS | HEART RATE: 77 BPM | SYSTOLIC BLOOD PRESSURE: 132 MMHG | RESPIRATION RATE: 16 BRPM

## 2021-08-30 DIAGNOSIS — F41.9 ANXIETY: ICD-10-CM

## 2021-08-30 DIAGNOSIS — N18.31 STAGE 3A CHRONIC KIDNEY DISEASE (HCC): ICD-10-CM

## 2021-08-30 DIAGNOSIS — G25.0 BENIGN ESSENTIAL TREMOR: ICD-10-CM

## 2021-08-30 DIAGNOSIS — I10 ESSENTIAL HYPERTENSION: Primary | ICD-10-CM

## 2021-08-30 DIAGNOSIS — E78.5 HYPERLIPIDEMIA, UNSPECIFIED HYPERLIPIDEMIA TYPE: ICD-10-CM

## 2021-08-30 DIAGNOSIS — K21.9 GASTROESOPHAGEAL REFLUX DISEASE WITHOUT ESOPHAGITIS: ICD-10-CM

## 2021-08-30 DIAGNOSIS — R73.02 IMPAIRED GLUCOSE TOLERANCE: ICD-10-CM

## 2021-08-30 PROCEDURE — 99214 OFFICE O/P EST MOD 30 MIN: CPT | Performed by: STUDENT IN AN ORGANIZED HEALTH CARE EDUCATION/TRAINING PROGRAM

## 2021-08-30 RX ORDER — LORAZEPAM 0.5 MG/1
0.5 TABLET ORAL 2 TIMES DAILY PRN
Qty: 30 TABLET | Refills: 0 | Status: SHIPPED | OUTPATIENT
Start: 2021-08-30 | End: 2021-09-27 | Stop reason: SDUPTHER

## 2021-08-30 ASSESSMENT — ENCOUNTER SYMPTOMS
NAUSEA: 0
ABDOMINAL PAIN: 0
SORE THROAT: 0
WHEEZING: 0
SHORTNESS OF BREATH: 0

## 2021-08-30 NOTE — PROGRESS NOTES
8/30/2021    Luke Henderson    Chief Complaint   Patient presents with    Follow-up     Presenting for 2 month follow up visit, hld, anxiety.  Joint Swelling     Getting better. HPI  History was obtained from patient. Accompanied by . Nicole Goodwin is a 80 y.o. female with a PMHx as listed below who presents today for follow up on chronic conditions    Recall patient Rx. Trial of ditropan for urinary frequency this has been now much improved. 9 pound weight loss since last visit    Patient complaining of some ankle swelling. This has been ongoing since change in bp meds to diovan. Mood/anxiety stable. Patient due for lab work. Has not received Tdap. 1. Essential hypertension    2. Anxiety    3. Gastroesophageal reflux disease without esophagitis    4. Impaired glucose tolerance    5. Stage 3a chronic kidney disease ([address]    6. Benign essential tremor    7. Hyperlipidemia, unspecified hyperlipidemia type             REVIEW OF SYMPTOMS    Review of Systems   Constitutional: Negative for chills and fatigue. HENT: Negative for congestion and sore throat. Respiratory: Negative for shortness of breath and wheezing. Cardiovascular: Negative for chest pain and palpitations. Gastrointestinal: Negative for abdominal pain and nausea. Genitourinary: Negative for frequency and urgency. Musculoskeletal:        Essential tremor   Neurological: Negative for light-headedness.        PAST MEDICAL HISTORY  Past Medical History:   Diagnosis Date    Anxiety     Cardiomegaly     Cerebral palsy ([address]     Chronic bronchitis ([address]     Compression fracture 2/2013    age of onset unknown    COPD (chronic obstructive pulmonary disease) ([address]     Depression     Environmental allergies     Essential hypertension, benign 1985    white coat hypertension    Family history of tremor     GERD (gastroesophageal reflux disease)     Hyperlipemia     dx'd 1985    Impaired glucose tolerance     Impaired glucose tolerance     Lichen sclerosus     Mild mitral regurgitation     Mild tricuspid regurgitation     per 7/2014 echo    Moderate aortic insufficiency     Osteoarthritis of lumbar spine     Osteoarthritis of thoracic spine     Osteoporosis     Sleep apnea     Vertigo        FAMILY HISTORY  Family History   Problem Relation Age of Onset    Cancer Brother         colon cancer    Other Brother         meningitis    Heart Disease Mother     Heart Disease Father     Heart Attack Father     Diabetes Sister         type 2    Macular Degen Sister     Other Sister         macular degeneration    Heart Disease Sister     Cancer Daughter         thyroid cancer    Stroke Son     No Known Problems Brother     Cancer Brother         prostate    Diabetes Sister     Diabetes Sister        SOCIAL HISTORY  Social History     Socioeconomic History    Marital status:      Spouse name: Not on file    Number of children: 2    Years of education: Not on file    Highest education level: Not on file   Occupational History    Occupation: unemployed   Tobacco Use    Smoking status: Never Smoker    Smokeless tobacco: Never Used   Substance and Sexual Activity    Alcohol use: Not on file    Drug use: Not on file    Sexual activity: Not on file   Other Topics Concern    Not on file   Social History Narrative    Not on file     Social Determinants of Health     Financial Resource Strain: Low Risk     Difficulty of Paying Living Expenses: Not hard at all   Food Insecurity: No Food Insecurity    Worried About Running Out of Food in the Last Year: Never true    Olu of Food in the Last Year: Never true   Transportation Needs: No Transportation Needs    Lack of Transportation (Medical): No    Lack of Transportation (Non-Medical):  No   Physical Activity:     Days of Exercise per Week:     Minutes of Exercise per Session:    Stress:     Feeling of Stress :    Social Connections:     Frequency breakfast). (Patient not taking: Reported on 3/30/2021) 90 tablet 1    loratadine (CLARITIN) 10 MG tablet Take 1 tablet by mouth daily. (Patient not taking: Reported on 6/2/2021) 30 tablet 11    albuterol (PROVENTIL HFA;VENTOLIN HFA) 108 (90 BASE) MCG/ACT inhaler Inhale 2 puffs into the lungs every 6 hours as needed. (Patient not taking: Reported on 3/30/2021) 1 Inhaler 1     No current facility-administered medications for this visit. ALLERGIES  Allergies   Allergen Reactions    Inderal [Propranolol Hcl]     Statins      Muscle aches    Tramadol Other (See Comments)     headache       PHYSICAL EXAM    /79   Pulse 77   Resp 16   Ht 5' 3\" (1.6 m)   Wt 165 lb (74.8 kg)   SpO2 95%   BMI 29.23 kg/m²     Physical Exam  Constitutional:       Appearance: Normal appearance. HENT:      Head: Normocephalic and atraumatic. Mouth/Throat:      Mouth: Mucous membranes are moist.   Eyes:      Extraocular Movements: Extraocular movements intact. Pupils: Pupils are equal, round, and reactive to light. Cardiovascular:      Rate and Rhythm: Normal rate and regular rhythm. Pulses: Normal pulses. Heart sounds: No murmur heard. No friction rub. No gallop. Pulmonary:      Effort: Pulmonary effort is normal.      Breath sounds: Normal breath sounds. Musculoskeletal:      Cervical back: Neck supple. Comments: Gait assissted with walker   Skin:     General: Skin is warm and dry. Neurological:      General: No focal deficit present. Mental Status: She is alert. Psychiatric:         Mood and Affect: Mood normal.         Behavior: Behavior normal.         ASSESSMENT & PLAN    1. Essential hypertension  -well controlled, will stop amlodipine as patient having some leg swelling, worse in evenings  -prior Echo 2014 reviewed LVEF 50% no wall abnormlities. Mild MR, TR  - Comprehensive Metabolic Panel; Future  - CBC WITH AUTO DIFFERENTIAL; Future    2.  Anxiety  -increase ativan to

## 2021-09-21 DIAGNOSIS — F41.9 ANXIETY: ICD-10-CM

## 2021-09-22 RX ORDER — LORAZEPAM 0.5 MG/1
0.5 TABLET ORAL 2 TIMES DAILY PRN
Qty: 30 TABLET | Refills: 1 | OUTPATIENT
Start: 2021-09-22 | End: 2021-10-22

## 2021-09-24 DIAGNOSIS — F41.9 ANXIETY: ICD-10-CM

## 2021-09-24 DIAGNOSIS — R42 VERTIGO: ICD-10-CM

## 2021-09-24 RX ORDER — LORAZEPAM 0.5 MG/1
0.5 TABLET ORAL 2 TIMES DAILY PRN
Qty: 30 TABLET | Refills: 0 | OUTPATIENT
Start: 2021-09-24 | End: 2021-10-24

## 2021-09-24 RX ORDER — MECLIZINE HYDROCHLORIDE 25 MG/1
25 TABLET ORAL 4 TIMES DAILY PRN
Qty: 30 TABLET | Refills: 1 | Status: SHIPPED | OUTPATIENT
Start: 2021-09-24 | End: 2022-01-04

## 2021-09-27 DIAGNOSIS — F41.9 ANXIETY: ICD-10-CM

## 2021-09-27 DIAGNOSIS — N39.46 MIXED STRESS AND URGE INCONTINENCE: ICD-10-CM

## 2021-09-27 RX ORDER — LORAZEPAM 0.5 MG/1
0.5 TABLET ORAL 2 TIMES DAILY PRN
Qty: 30 TABLET | Refills: 0 | Status: SHIPPED | OUTPATIENT
Start: 2021-09-27 | End: 2021-11-02 | Stop reason: SDUPTHER

## 2021-09-27 RX ORDER — OXYBUTYNIN CHLORIDE 10 MG/1
10 TABLET, EXTENDED RELEASE ORAL DAILY
Qty: 30 TABLET | Refills: 2 | Status: SHIPPED | OUTPATIENT
Start: 2021-09-27 | End: 2022-01-11

## 2021-09-27 NOTE — TELEPHONE ENCOUNTER
LV-08/30  NV-09/28      PT ALSO NEEDS HER BLADDER MEDICATION. PATIENT WAS UNSURE OF WHAT [facility] IS CALLED. [facility]

## 2021-11-01 DIAGNOSIS — F41.9 ANXIETY: ICD-10-CM

## 2021-11-02 RX ORDER — LORAZEPAM 0.5 MG/1
0.5 TABLET ORAL 2 TIMES DAILY PRN
Qty: 30 TABLET | Refills: 0 | Status: SHIPPED | OUTPATIENT
Start: 2021-11-02 | End: 2021-12-06 | Stop reason: SDUPTHER

## 2021-12-06 DIAGNOSIS — F41.9 ANXIETY: ICD-10-CM

## 2021-12-06 RX ORDER — LORAZEPAM 0.5 MG/1
0.5 TABLET ORAL 2 TIMES DAILY PRN
Qty: 30 TABLET | Refills: 0 | Status: SHIPPED | OUTPATIENT
Start: 2021-12-06 | End: 2022-02-18 | Stop reason: SDUPTHER

## 2021-12-07 DIAGNOSIS — I10 ESSENTIAL HYPERTENSION: ICD-10-CM

## 2021-12-08 RX ORDER — VALSARTAN AND HYDROCHLOROTHIAZIDE 160; 12.5 MG/1; MG/1
TABLET, FILM COATED ORAL
Qty: 90 TABLET | Refills: 1 | Status: SHIPPED | OUTPATIENT
Start: 2021-12-08 | End: 2022-02-18

## 2022-01-03 DIAGNOSIS — R42 VERTIGO: ICD-10-CM

## 2022-01-04 RX ORDER — MECLIZINE HYDROCHLORIDE 25 MG/1
25 TABLET ORAL 4 TIMES DAILY PRN
Qty: 30 TABLET | Refills: 1 | Status: SHIPPED | OUTPATIENT
Start: 2022-01-04

## 2022-02-17 DIAGNOSIS — I10 HTN (HYPERTENSION): ICD-10-CM

## 2022-02-17 RX ORDER — ATENOLOL 50 MG/1
25 TABLET ORAL 2 TIMES DAILY
Qty: 90 TABLET | Refills: 1 | Status: SHIPPED | OUTPATIENT
Start: 2022-02-17 | End: 2022-08-10 | Stop reason: SDUPTHER

## 2022-02-17 NOTE — TELEPHONE ENCOUNTER
----- Message from Natasha Nixon sent at 2022 10:54 AM EST -----  Subject: Medication Problem    QUESTIONS  Name of Medication? atenolol (TENORMIN) 50 MG tablet  Patient-reported dosage and instructions? Take 0.5 tablets by mouth 2   times daily  What question or problem do you have with the medication? PATIENT STATED   MEDICATION WAS FILLED BY PHYSICIAN, PHARMACY INFORMED TO CALL DOCTOR DUE   TO , PATIENT STATED SHE HAS APPOINTMENT 2022 @3:00PM  Preferred Pharmacy? [facility] #[id number]- [place] [facility] [place], [address] - P [phone] - F [phone]  Pharmacy phone number (if available)? [phone]  Additional Information for Provider?   ---------------------------------------------------------------------------  --------------  [address]  What is the best way for the office to contact you? OK to leave message on   voicemail  Preferred Call Back Phone Number? [phone]  ---------------------------------------------------------------------------  --------------  SCRIPT ANSWERS  Relationship to Patient?  Self

## 2022-02-18 ENCOUNTER — OFFICE VISIT (OUTPATIENT)
Dept: FAMILY MEDICINE CLINIC | Age: 82
End: 2022-02-18
Payer: MEDICARE

## 2022-02-18 VITALS
OXYGEN SATURATION: 95 % | SYSTOLIC BLOOD PRESSURE: 142 MMHG | HEART RATE: 62 BPM | DIASTOLIC BLOOD PRESSURE: 70 MMHG | BODY MASS INDEX: 29.77 KG/M2 | WEIGHT: 168 LBS | HEIGHT: 63 IN

## 2022-02-18 DIAGNOSIS — L90.0 LICHEN SCLEROSUS: ICD-10-CM

## 2022-02-18 DIAGNOSIS — Z23 NEED FOR IMMUNIZATION AGAINST INFLUENZA: ICD-10-CM

## 2022-02-18 DIAGNOSIS — N32.81 OVERACTIVE BLADDER: Primary | ICD-10-CM

## 2022-02-18 DIAGNOSIS — N39.46 MIXED STRESS AND URGE INCONTINENCE: ICD-10-CM

## 2022-02-18 DIAGNOSIS — E78.5 HYPERLIPIDEMIA, UNSPECIFIED HYPERLIPIDEMIA TYPE: ICD-10-CM

## 2022-02-18 DIAGNOSIS — F41.9 ANXIETY: ICD-10-CM

## 2022-02-18 DIAGNOSIS — I10 PRIMARY HYPERTENSION: ICD-10-CM

## 2022-02-18 DIAGNOSIS — R73.02 IMPAIRED GLUCOSE TOLERANCE: ICD-10-CM

## 2022-02-18 PROCEDURE — 1036F TOBACCO NON-USER: CPT | Performed by: STUDENT IN AN ORGANIZED HEALTH CARE EDUCATION/TRAINING PROGRAM

## 2022-02-18 PROCEDURE — 0509F URINE INCON PLAN DOCD: CPT | Performed by: STUDENT IN AN ORGANIZED HEALTH CARE EDUCATION/TRAINING PROGRAM

## 2022-02-18 PROCEDURE — G8427 DOCREV CUR MEDS BY ELIG CLIN: HCPCS | Performed by: STUDENT IN AN ORGANIZED HEALTH CARE EDUCATION/TRAINING PROGRAM

## 2022-02-18 PROCEDURE — G8399 PT W/DXA RESULTS DOCUMENT: HCPCS | Performed by: STUDENT IN AN ORGANIZED HEALTH CARE EDUCATION/TRAINING PROGRAM

## 2022-02-18 PROCEDURE — 99214 OFFICE O/P EST MOD 30 MIN: CPT | Performed by: STUDENT IN AN ORGANIZED HEALTH CARE EDUCATION/TRAINING PROGRAM

## 2022-02-18 PROCEDURE — G8417 CALC BMI ABV UP PARAM F/U: HCPCS | Performed by: STUDENT IN AN ORGANIZED HEALTH CARE EDUCATION/TRAINING PROGRAM

## 2022-02-18 PROCEDURE — G8484 FLU IMMUNIZE NO ADMIN: HCPCS | Performed by: STUDENT IN AN ORGANIZED HEALTH CARE EDUCATION/TRAINING PROGRAM

## 2022-02-18 PROCEDURE — 4040F PNEUMOC VAC/ADMIN/RCVD: CPT | Performed by: STUDENT IN AN ORGANIZED HEALTH CARE EDUCATION/TRAINING PROGRAM

## 2022-02-18 PROCEDURE — 1090F PRES/ABSN URINE INCON ASSESS: CPT | Performed by: STUDENT IN AN ORGANIZED HEALTH CARE EDUCATION/TRAINING PROGRAM

## 2022-02-18 PROCEDURE — 1123F ACP DISCUSS/DSCN MKR DOCD: CPT | Performed by: STUDENT IN AN ORGANIZED HEALTH CARE EDUCATION/TRAINING PROGRAM

## 2022-02-18 RX ORDER — VALSARTAN AND HYDROCHLOROTHIAZIDE 320; 12.5 MG/1; MG/1
1 TABLET, FILM COATED ORAL DAILY
Qty: 90 TABLET | Refills: 1 | Status: SHIPPED | OUTPATIENT
Start: 2022-02-18

## 2022-02-18 RX ORDER — LORAZEPAM 0.5 MG/1
0.5 TABLET ORAL 2 TIMES DAILY PRN
Qty: 30 TABLET | Refills: 0 | Status: SHIPPED | OUTPATIENT
Start: 2022-02-18 | End: 2022-04-06 | Stop reason: SDUPTHER

## 2022-02-18 RX ORDER — CLOBETASOL PROPIONATE 0.5 MG/ML
LOTION TOPICAL
Qty: 60 G | Refills: 1 | Status: SHIPPED | OUTPATIENT
Start: 2022-02-18 | End: 2022-10-07

## 2022-02-18 NOTE — PROGRESS NOTES
2/28/2022    Jasen Dalton    Chief Complaint   Patient presents with   Micky Burt     routine office visit, pt reports no concerns    Other     pt states she would like a flu shot today    Medication Refill       HPI  History was obtained from patient. Justine Pillai is a 80 y.o. female with a PMHx as listed below who presents today for   3 month follow up chronic conditions. Recall last appointment stopped amlodipine due to leg swelling, this has improved. Anxiety a little worse today, however overal stable on current regimen. Patient has been on same regimen for many years. BP mild elevation today    Declines covid vaccine    1. Overactive bladder    2. Anxiety    3. Primary hypertension    4. Mixed stress and urge incontinence    5. Hyperlipidemia, unspecified hyperlipidemia type    6. Impaired glucose tolerance    7. Need for immunization against influenza    8. Lichen sclerosus             REVIEW OF SYMPTOMS    Review of Systems   Constitutional: Negative for chills and fatigue. HENT: Negative for congestion and sore throat. Respiratory: Negative for shortness of breath and wheezing. Cardiovascular: Negative for chest pain and palpitations. Gastrointestinal: Negative for abdominal pain and nausea. Genitourinary: Negative for frequency and urgency. Neurological: Negative for light-headedness.        PAST MEDICAL HISTORY  Past Medical History:   Diagnosis Date    Anxiety     Cardiomegaly     Cerebral palsy ([address]     Chronic bronchitis ([address]     Compression fracture 2/2013    age of onset unknown    COPD (chronic obstructive pulmonary disease) ([address]     Depression     Environmental allergies     Essential hypertension, benign 1985    white coat hypertension    Family history of tremor     GERD (gastroesophageal reflux disease)     Hyperlipemia     dx'd 1985    Impaired glucose tolerance     Impaired glucose tolerance     Lichen sclerosus     Mild mitral regurgitation     Mild tricuspid regurgitation     per 7/2014 echo    Moderate aortic insufficiency     Osteoarthritis of lumbar spine     Osteoarthritis of thoracic spine     Osteoporosis     Sleep apnea     Vertigo        FAMILY HISTORY  Family History   Problem Relation Age of Onset    Cancer Brother         colon cancer    Other Brother         meningitis    Heart Disease Mother     Heart Disease Father     Heart Attack Father     Diabetes Sister         type 2    Macular Degen Sister     Other Sister         macular degeneration    Heart Disease Sister     Cancer Daughter         thyroid cancer    Stroke Son     No Known Problems Brother     Cancer Brother         prostate    Diabetes Sister     Diabetes Sister        SOCIAL HISTORY  Social History     Socioeconomic History    Marital status:      Spouse name: None    Number of children: 2    Years of education: None    Highest education level: None   Occupational History    Occupation: unemployed   Tobacco Use    Smoking status: Never Smoker    Smokeless tobacco: Never Used   Substance and Sexual Activity    Alcohol use: None    Drug use: None    Sexual activity: None   Other Topics Concern    None   Social History Narrative    None     Social Determinants of Health     Financial Resource Strain: Low Risk     Difficulty of Paying Living Expenses: Not hard at all   Food Insecurity: No Food Insecurity    Worried About Running Out of Food in the Last Year: Never true    Olu of Food in the Last Year: Never true   Transportation Needs: No Transportation Needs    Lack of Transportation (Medical): No    Lack of Transportation (Non-Medical):  No   Physical Activity:     Days of Exercise per Week: Not on file    Minutes of Exercise per Session: Not on file   Stress:     Feeling of Stress : Not on file   Social Connections:     Frequency of Communication with Friends and Family: Not on file    Frequency of Social Gatherings with Friends and Family: Not on file    Attends Sikh Services: Not on file    Active Member of Clubs or Organizations: Not on file    Attends Club or Organization Meetings: Not on file    Marital Status: Not on file   Intimate Partner Violence:     Fear of Current or Ex-Partner: Not on file    Emotionally Abused: Not on file    Physically Abused: Not on file    Sexually Abused: Not on file   Housing Stability:     Unable to Pay for Housing in the Last Year: Not on file    Number of [place] in the Last Year: Not on file    Unstable Housing in the Last Year: Not on file        SURGICAL HISTORY  Past Surgical History:   Procedure Laterality Date    CERVIX BIOPSY  03/2011    cone biopsy    CHOLECYSTECTOMY      COLONOSCOPY  6/2012    DILATION AND CURETTAGE OF UTERUS                   CURRENT MEDICATIONS  Current Outpatient Medications   Medication Sig Dispense Refill    LORazepam (ATIVAN) 0.5 MG tablet Take 1 tablet by mouth 2 times daily as needed for Anxiety for up to 30 days. 30 tablet 0    clobetasol propionate 0.05 % LOTN lotion Apply topically 2 times daily 60 g 1    sertraline (ZOLOFT) 50 MG tablet Take 1 tablet by mouth daily 90 tablet 0    Vibegron 75 MG TABS Take 75 mg by mouth daily 90 tablet 0    valsartan-hydroCHLOROthiazide (DIOVAN-HCT) 320-12.5 MG per tablet Take 1 tablet by mouth daily 90 tablet 1    atenolol (TENORMIN) 50 MG tablet Take 0.5 tablets by mouth 2 times daily 90 tablet 1    oxybutynin (DITROPAN-XL) 10 MG extended release tablet TAKE 1 TABLET BY MOUTH EVERY DAY 90 tablet 1    meclizine (ANTIVERT) 25 MG tablet TAKE 1 TABLET BY MOUTH 4 TIMES DAILY AS NEEDED FOR DIZZINESS 30 tablet 1    ezetimibe (ZETIA) 10 MG tablet Take 1 tablet by mouth once daily 90 tablet 1    Multiple Vitamins-Calcium (VIACTIV MULTI-VITAMIN) CHEW Take 1 tablet by mouth daily.  calcium carbonate-vitamin D (CALTRATE 600+D) 600-400 MG-UNIT TABS per tab Take 1 tablet by mouth 2 times daily.  60 tablet 11 tablet; Take 1 tablet by mouth 2 times daily as needed for Anxiety for up to 30 days. Dispense: 30 tablet; Refill: 0  - sertraline (ZOLOFT) 50 MG tablet; Take 1 tablet by mouth daily  Dispense: 90 tablet; Refill: 0    2. Overactive bladder  - Vibegron 75 MG TABS; Take 75 mg by mouth daily  Dispense: 90 tablet; Refill: 0    3. Primary hypertension  -bp mild elevation  - valsartan-hydroCHLOROthiazide (DIOVAN-HCT) 320-12.5 MG per tablet; Take 1 tablet by mouth daily  Dispense: 90 tablet; Refill: 1    4. Mixed stress and urge incontinence  Continue current regimen    5. Hyperlipidemia, unspecified hyperlipidemia type  -HLD stable    6. Impaired glucose tolerance  -f/u labs prior to next appt  - Basic Metabolic Panel; Future  - Hemoglobin A1C; Future    7. Need for immunization against influenza  - INFLUENZA, QUADV, ADJUVANTED, 65 YRS =, IM, PF, PREFILL SYR, 0.5ML (FLUAD)    8. Lichen sclerosus  - clobetasol propionate 0.05 % LOTN lotion; Apply topically 2 times daily  Dispense: 60 g; Refill: 1    Increase sertraline to 50mg from 25  Repeat labs prior to next appt. Declines essential tremor medications    Try to change ditropan to vibegron to help with improvement overactive bladder    BP elevated increase valsartan 320mg    Return in about 3 months (around 5/18/2022).          Electronically signed by Rhona Mccann DO on 2/28/2022

## 2022-02-28 ASSESSMENT — ENCOUNTER SYMPTOMS
SHORTNESS OF BREATH: 0
ABDOMINAL PAIN: 0
WHEEZING: 0
SORE THROAT: 0
NAUSEA: 0

## 2022-03-18 ENCOUNTER — TELEPHONE (OUTPATIENT)
Dept: FAMILY MEDICINE CLINIC | Age: 82
End: 2022-03-18

## 2022-03-18 NOTE — TELEPHONE ENCOUNTER
Rogerio Garcia approved 02/21/22. [email or web address]    Key : [facility]

## 2022-03-25 DIAGNOSIS — F41.9 ANXIETY: ICD-10-CM

## 2022-03-25 RX ORDER — LORAZEPAM 0.5 MG/1
0.5 TABLET ORAL 2 TIMES DAILY PRN
Qty: 30 TABLET | Refills: 0 | OUTPATIENT
Start: 2022-03-25 | End: 2022-04-24

## 2022-03-30 DIAGNOSIS — E78.5 HYPERLIPIDEMIA, UNSPECIFIED HYPERLIPIDEMIA TYPE: ICD-10-CM

## 2022-03-30 RX ORDER — EZETIMIBE 10 MG/1
TABLET ORAL
Qty: 90 TABLET | Refills: 0 | Status: SHIPPED | OUTPATIENT
Start: 2022-03-30 | End: 2022-06-27

## 2022-04-06 DIAGNOSIS — F41.9 ANXIETY: ICD-10-CM

## 2022-04-06 RX ORDER — LORAZEPAM 0.5 MG/1
0.5 TABLET ORAL 2 TIMES DAILY PRN
Qty: 30 TABLET | Refills: 0 | Status: SHIPPED | OUTPATIENT
Start: 2022-04-06 | End: 2022-05-11 | Stop reason: SDUPTHER

## 2022-04-26 DIAGNOSIS — F41.9 ANXIETY: ICD-10-CM

## 2022-04-26 RX ORDER — SERTRALINE HYDROCHLORIDE 25 MG/1
25 TABLET, FILM COATED ORAL DAILY
Qty: 30 TABLET | Refills: 5 | Status: SHIPPED | OUTPATIENT
Start: 2022-04-26

## 2022-04-26 NOTE — TELEPHONE ENCOUNTER
Is okay to lower the sertraline dose to 25  She has some 50 she can break them in half is very easily divided  New prescription should be for 25 mg would you pend that and I will sign it please

## 2022-04-26 NOTE — TELEPHONE ENCOUNTER
----- Message from Afua Day sent at 4/26/2022 11:14 AM EDT -----  Subject: Medication Problem    QUESTIONS  Name of Medication? sertraline (ZOLOFT) 50 MG tablet  Patient-reported dosage and instructions? Take 1 tablet by mouth daily  What question or problem do you have with the medication? Patient stated   she would like for medication to lowered to 25 mg, patient stated she   afraid to take 50mg due to unable to sleep past couple of night. would   like for office to call with questions and concerns she has. Preferred Pharmacy? [id number]- [place], [address] - P [phone] - F [phone]  Pharmacy phone number (if available)? [phone]  Additional Information for Provider?   ---------------------------------------------------------------------------  --------------  [address]  What is the best way for the office to contact you? OK to leave message on   voicemail  Preferred Call Back Phone Number? [phone]  ---------------------------------------------------------------------------  --------------  SCRIPT ANSWERS  Relationship to Patient?  Self

## 2022-05-10 DIAGNOSIS — F41.9 ANXIETY: ICD-10-CM

## 2022-05-11 RX ORDER — LORAZEPAM 0.5 MG/1
0.5 TABLET ORAL 2 TIMES DAILY PRN
Qty: 30 TABLET | Refills: 0 | Status: SHIPPED | OUTPATIENT
Start: 2022-05-11 | End: 2022-06-21 | Stop reason: SDUPTHER

## 2022-05-18 DIAGNOSIS — F41.9 ANXIETY: ICD-10-CM

## 2022-06-06 ENCOUNTER — TELEPHONE (OUTPATIENT)
Dept: FAMILY MEDICINE CLINIC | Age: 82
End: 2022-06-06

## 2022-06-06 NOTE — TELEPHONE ENCOUNTER
Spoke with Charlie Seth. She called in and could not remember if Valsartan HCTZ was increased in dosage. I verified per last note that it was increased . BP elevated increase valsartan 320mg per Dr. Radha Merrill last note. I let her know she is done with old dose and may start taking new dose.  Charlie Seth verbalized understanding.

## 2022-06-08 ENCOUNTER — TELEMEDICINE (OUTPATIENT)
Dept: FAMILY MEDICINE CLINIC | Age: 82
End: 2022-06-08
Payer: MEDICARE

## 2022-06-08 DIAGNOSIS — Z00.00 MEDICARE ANNUAL WELLNESS VISIT, SUBSEQUENT: Primary | ICD-10-CM

## 2022-06-08 PROCEDURE — G0439 PPPS, SUBSEQ VISIT: HCPCS | Performed by: STUDENT IN AN ORGANIZED HEALTH CARE EDUCATION/TRAINING PROGRAM

## 2022-06-08 PROCEDURE — 1123F ACP DISCUSS/DSCN MKR DOCD: CPT | Performed by: STUDENT IN AN ORGANIZED HEALTH CARE EDUCATION/TRAINING PROGRAM

## 2022-06-08 RX ORDER — ASCORBIC ACID 500 MG
500 TABLET ORAL DAILY
COMMUNITY

## 2022-06-08 RX ORDER — VIBEGRON 75 MG/1
TABLET, FILM COATED ORAL
COMMUNITY

## 2022-06-08 SDOH — ECONOMIC STABILITY: FOOD INSECURITY: WITHIN THE PAST 12 MONTHS, YOU WORRIED THAT YOUR FOOD WOULD RUN OUT BEFORE YOU GOT MONEY TO BUY MORE.: NEVER TRUE

## 2022-06-08 SDOH — ECONOMIC STABILITY: FOOD INSECURITY: WITHIN THE PAST 12 MONTHS, THE FOOD YOU BOUGHT JUST DIDN'T LAST AND YOU DIDN'T HAVE MONEY TO GET MORE.: NEVER TRUE

## 2022-06-08 ASSESSMENT — PATIENT HEALTH QUESTIONNAIRE - PHQ9
SUM OF ALL RESPONSES TO PHQ QUESTIONS 1-9: 0
SUM OF ALL RESPONSES TO PHQ QUESTIONS 1-9: 0
SUM OF ALL RESPONSES TO PHQ9 QUESTIONS 1 & 2: 0
1. LITTLE INTEREST OR PLEASURE IN DOING THINGS: 0
SUM OF ALL RESPONSES TO PHQ QUESTIONS 1-9: 0
2. FEELING DOWN, DEPRESSED OR HOPELESS: 0
SUM OF ALL RESPONSES TO PHQ QUESTIONS 1-9: 0

## 2022-06-08 ASSESSMENT — LIFESTYLE VARIABLES: HOW OFTEN DO YOU HAVE A DRINK CONTAINING ALCOHOL: NEVER

## 2022-06-08 ASSESSMENT — SOCIAL DETERMINANTS OF HEALTH (SDOH): HOW HARD IS IT FOR YOU TO PAY FOR THE VERY BASICS LIKE FOOD, HOUSING, MEDICAL CARE, AND HEATING?: NOT HARD AT ALL

## 2022-06-08 NOTE — PROGRESS NOTES
Medicare Annual Wellness Visit    Mario Tapia is here for Medicare AWV    Assessment & Plan   Medicare annual wellness visit, subsequent      Recommendations for Preventive Services Due: see orders and patient instructions/AVS.  Recommended screening schedule for the next 5-10 years is provided to the patient in written form: see Patient Instructions/AVS.     No follow-ups on file. Subjective       Patient's complete Health Risk Assessment and screening values have been reviewed and are found in Flowsheets. The following problems were reviewed today and where indicated follow up appointments were made and/or referrals ordered.     Positive Risk Factor Screenings with Interventions:    Fall Risk:  Do you feel unsteady or are you worried about falling? : (!) yes (uses a walker)  2 or more falls in past year?: (!) yes  Fall with injury in past year?: no     Fall Risk Interventions:    · Patient declines any further evaluation/treatment for this issue  · states she uses a walker for [id number] [facility] and ACP:  General  In general, how would you say your health is?: Fair  In the past 7 days, have you experienced any of the following: New or Increased Pain, New or Increased Fatigue, Loneliness, Social Isolation, Stress or Anger?: No  Do you get the social and emotional support that you need?: Yes  Do you have a Living Will?: (!) No    Advance Directives     Power of  Living Will ACP-Advance Directive ACP-Power of     Not on File Not on File Not on File Not on File      General Health Risk Interventions:  · No Living Will: Patient declines ACP discussion/assistance    Health Habits/Nutrition:     Physical Activity: Inactive    Days of Exercise per Week: 0 days    Minutes of Exercise per Session: 0 min     Have you lost any weight without trying in the past 3 months?: No     Have you seen the dentist within the past year?: N/A - wear dentures    Health Habits/Nutrition Cholecalciferol (VITAMIN D3) 125 MCG (5000 UT) TABS Take by mouth Yes Historical Provider, MD   Vibegron (GEMTESA) 75 MG TABS Take by mouth Yes Historical Provider, MD   LORazepam (ATIVAN) 0.5 MG tablet Take 1 tablet by mouth 2 times daily as needed for Anxiety for up to 30 days. Yes Jone Griffin DO   sertraline (ZOLOFT) 25 MG tablet Take 1 tablet by mouth daily Yes Joseph Díaz MD   ezetimibe (ZETIA) 10 MG tablet TAKE 1 TABLET BY MOUTH EVERY DAY Yes Jone Griffin DO   clobetasol propionate 0.05 % LOTN lotion Apply topically 2 times daily Yes Jone Griffin DO   valsartan-hydroCHLOROthiazide (DIOVAN-HCT) 320-12.5 MG per tablet Take 1 tablet by mouth daily Yes Jone Griffin DO   atenolol (TENORMIN) 50 MG tablet Take 0.5 tablets by mouth 2 times daily Yes Jone Griffin DO   meclizine (ANTIVERT) 25 MG tablet TAKE 1 TABLET BY MOUTH 4 TIMES DAILY AS NEEDED FOR DIZZINESS Yes Jone Griffin DO   calcium carbonate-vitamin D (CALTRATE 600+D) 600-400 MG-UNIT TABS per tab Take 1 tablet by mouth 2 times daily. Yes Rik Land MD   oxybutynin (DITROPAN-XL) 10 MG extended release tablet TAKE 1 TABLET BY MOUTH EVERY DAY  Patient not taking: Reported on 6/8/2022  Jone Griffin DO   simvastatin (ZOCOR) 40 MG tablet Take 1 tablet by mouth nightly  Patient not taking: Reported on 6/2/2021  Jone Griffin DO   metFORMIN (GLUCOPHAGE) 500 MG tablet Take 1 tablet by mouth daily (with breakfast). Patient not taking: Reported on 3/30/2021  Rik Land MD   loratadine (CLARITIN) 10 MG tablet Take 1 tablet by mouth daily. Patient not taking: Reported on 6/2/2021  Rik Land MD   Multiple Vitamins-Calcium (VIACTIV MULTI-VITAMIN) CHEW Take 1 tablet by mouth daily. Patient not taking: Reported on 6/8/2022  Historical Provider, MD   albuterol (PROVENTIL HFA;VENTOLIN HFA) 108 (90 BASE) MCG/ACT inhaler Inhale 2 puffs into the lungs every 6 hours as needed.   Patient not taking: Reported on 3/30/2021  Rik Land MD   Multiple Vitamins-Minerals (VISION FORMULA) TABS Take by mouth    Patient not taking: Reported on 2/18/2022  Historical Provider, MD Tran (Including outside providers/suppliers regularly involved in providing care):   Patient Care Team:  Koko Alarcon DO as PCP - General (Family Medicine)  Koko Alarcon DO as PCP - [facility] Empaneled Provider  Dennis Newton MD as Consulting Physician (Gastroenterology)  Melina Rodriguez DO as Consulting Physician (Obstetrics & Gynecology)     Reviewed and updated this visit:  Tobacco  Allergies  Meds  Med Hx  Surg Hx  Soc Hx  Fam Hx           I, Dean Bernal LPN, 7/9/5025, performed the documented evaluation under the direct supervision of the attending physician. Annel Berman, was evaluated through a synchronous (real-time) audio encounter. The patient (or guardian if applicable) is aware that this is a billable service, which includes applicable co-pays. This Virtual Visit was conducted with patient's (and/or legal guardian's) consent. The visit was conducted pursuant to the emergency declaration under the [facility], [address] [place] [address] [facility] authority and the [employer] General Act. Patient identification was verified, and a caregiver was present when appropriate. The patient was located at Home: [address] [place] [address]. Provider was located at [facility] ([address] [place] [address]): [address] [place] [address]. [place] [address]. Total time spent for this encounter: Not billed by time    --Dean Bernal LPN on 3/5/4362 at 6:68 PM    An electronic signature was used to authenticate this note. This encounter was performed under myKoko DOs, direct supervision, 6/8/2022.

## 2022-06-08 NOTE — PATIENT INSTRUCTIONS
Personalized Preventive Plan for Rebeca Land - 6/8/2022  Medicare offers a range of preventive health benefits. Some of the tests and screenings are paid in full while other may be subject to a deductible, co-insurance, and/or copay. Some of these benefits include a comprehensive review of your medical history including lifestyle, illnesses that may run in your family, and various assessments and screenings as appropriate. After reviewing your medical record and screening and assessments performed today your provider may have ordered immunizations, labs, imaging, and/or referrals for you. A list of these orders (if applicable) as well as your Preventive Care list are included within your After Visit Summary for your review. Other Preventive Recommendations:    · A preventive eye exam performed by an eye specialist is recommended every 1-2 years to screen for glaucoma; cataracts, macular degeneration, and other eye disorders. · A preventive dental visit is recommended every 6 months. · Try to get at least 150 minutes of exercise per week or 10,000 steps per day on a pedometer . · Order or download the FREE \"Exercise & Physical Activity: Your Everyday Guide\" from [employer]. Call 1-880.515.7178 or search [employer] online. · You need 2925-0898 mg of calcium and 7439-0539 IU of vitamin D per day. It is possible to meet your calcium requirement with diet alone, but a vitamin D supplement is usually necessary to meet this goal.  · When exposed to the sun, use a sunscreen that protects against both UVA and UVB radiation with an SPF of 30 or greater. Reapply every 2 to 3 hours or after sweating, drying off with a towel, or swimming. · Always wear a seat belt when traveling in a car. Always wear a helmet when riding a bicycle or motorcycle.

## 2022-06-21 DIAGNOSIS — F41.9 ANXIETY: ICD-10-CM

## 2022-06-21 RX ORDER — LORAZEPAM 0.5 MG/1
0.5 TABLET ORAL 2 TIMES DAILY PRN
Qty: 30 TABLET | Refills: 0 | Status: SHIPPED | OUTPATIENT
Start: 2022-06-21 | End: 2022-07-27

## 2022-06-21 NOTE — TELEPHONE ENCOUNTER
----- Message from Constantin Jung sent at 6/20/2022 10:01 AM EDT -----  Subject: Refill Request    QUESTIONS  Name of Medication? LORazepam (ATIVAN) 0.5 MG tablet  Patient-reported dosage and instructions? Take 1 tablet by mouth 2 times   daily as needed for Anxiety for up to 30 days. How many days do you have left? 2  Preferred Pharmacy? [id number]  Pharmacy phone number (if available)? [phone]  Additional Information for Provider? pt scheduled for an appt 07/06/2022.   ---------------------------------------------------------------------------  --------------  CALL BACK INFO  What is the best way for the office to contact you? OK to leave message on   voicemail  Preferred Call Back Phone Number? [phone]  ---------------------------------------------------------------------------  --------------  SCRIPT ANSWERS  Relationship to Patient?  Self

## 2022-06-25 DIAGNOSIS — E78.5 HYPERLIPIDEMIA, UNSPECIFIED HYPERLIPIDEMIA TYPE: ICD-10-CM

## 2022-06-27 RX ORDER — EZETIMIBE 10 MG/1
TABLET ORAL
Qty: 90 TABLET | Refills: 0 | Status: SHIPPED | OUTPATIENT
Start: 2022-06-27 | End: 2022-07-27

## 2022-07-06 ENCOUNTER — OFFICE VISIT (OUTPATIENT)
Dept: FAMILY MEDICINE CLINIC | Age: 82
End: 2022-07-06
Payer: MEDICARE

## 2022-07-06 VITALS
HEIGHT: 63 IN | HEART RATE: 55 BPM | OXYGEN SATURATION: 96 % | WEIGHT: 170 LBS | BODY MASS INDEX: 30.12 KG/M2 | RESPIRATION RATE: 16 BRPM | SYSTOLIC BLOOD PRESSURE: 134 MMHG | DIASTOLIC BLOOD PRESSURE: 89 MMHG

## 2022-07-06 DIAGNOSIS — I10 HYPERTENSION, UNSPECIFIED TYPE: ICD-10-CM

## 2022-07-06 DIAGNOSIS — E78.5 HYPERLIPIDEMIA, UNSPECIFIED HYPERLIPIDEMIA TYPE: ICD-10-CM

## 2022-07-06 DIAGNOSIS — R73.01 IMPAIRED FASTING GLUCOSE: ICD-10-CM

## 2022-07-06 DIAGNOSIS — N18.31 STAGE 3A CHRONIC KIDNEY DISEASE (HCC): ICD-10-CM

## 2022-07-06 DIAGNOSIS — M65.342 TRIGGER FINGER, LEFT RING FINGER: Primary | ICD-10-CM

## 2022-07-06 DIAGNOSIS — M85.80 OSTEOPENIA, UNSPECIFIED LOCATION: ICD-10-CM

## 2022-07-06 DIAGNOSIS — R73.02 IMPAIRED GLUCOSE TOLERANCE: ICD-10-CM

## 2022-07-06 DIAGNOSIS — N39.46 MIXED STRESS AND URGE INCONTINENCE: ICD-10-CM

## 2022-07-06 DIAGNOSIS — F41.9 ANXIETY: ICD-10-CM

## 2022-07-06 PROCEDURE — G8427 DOCREV CUR MEDS BY ELIG CLIN: HCPCS | Performed by: STUDENT IN AN ORGANIZED HEALTH CARE EDUCATION/TRAINING PROGRAM

## 2022-07-06 PROCEDURE — 1090F PRES/ABSN URINE INCON ASSESS: CPT | Performed by: STUDENT IN AN ORGANIZED HEALTH CARE EDUCATION/TRAINING PROGRAM

## 2022-07-06 PROCEDURE — G8417 CALC BMI ABV UP PARAM F/U: HCPCS | Performed by: STUDENT IN AN ORGANIZED HEALTH CARE EDUCATION/TRAINING PROGRAM

## 2022-07-06 PROCEDURE — G8399 PT W/DXA RESULTS DOCUMENT: HCPCS | Performed by: STUDENT IN AN ORGANIZED HEALTH CARE EDUCATION/TRAINING PROGRAM

## 2022-07-06 PROCEDURE — 1123F ACP DISCUSS/DSCN MKR DOCD: CPT | Performed by: STUDENT IN AN ORGANIZED HEALTH CARE EDUCATION/TRAINING PROGRAM

## 2022-07-06 PROCEDURE — 0509F URINE INCON PLAN DOCD: CPT | Performed by: STUDENT IN AN ORGANIZED HEALTH CARE EDUCATION/TRAINING PROGRAM

## 2022-07-06 PROCEDURE — 1036F TOBACCO NON-USER: CPT | Performed by: STUDENT IN AN ORGANIZED HEALTH CARE EDUCATION/TRAINING PROGRAM

## 2022-07-06 PROCEDURE — 99214 OFFICE O/P EST MOD 30 MIN: CPT | Performed by: STUDENT IN AN ORGANIZED HEALTH CARE EDUCATION/TRAINING PROGRAM

## 2022-07-06 RX ORDER — ALENDRONATE SODIUM 70 MG/1
70 TABLET ORAL
Qty: 4 TABLET | Refills: 3 | Status: SHIPPED | OUTPATIENT
Start: 2022-07-06 | End: 2022-07-27

## 2022-07-06 ASSESSMENT — ENCOUNTER SYMPTOMS
SORE THROAT: 0
NAUSEA: 0
ABDOMINAL PAIN: 0
WHEEZING: 0
SHORTNESS OF BREATH: 0

## 2022-07-06 NOTE — PROGRESS NOTES
7/6/2022    [facility]    Chief Complaint   Patient presents with    3 Month Follow-Up     Presenting for three month follow up visit.  Fall     Had fall 3-4 weeks ago. Left hand fourth digit has been sticking at times since. HPI  History was obtained from patient. Laurie Reyes is a 80 y.o. female with a PMHx as listed below who presents today for 3 month follow up on chronic conditions. Patient had recent fall roughly 4 weeks ago on porch after picking up groceries, after setting mild down started to lose balance. Landed on left hip, shoudler, elbow. No LOC. Had to get up with assistance of . Fall unwitnessed. No sz. Activity. Breathing stable  Urinary incontinence improved with gemtesa  Anxiety stable    FRAX score elevated 7.8% hip fx. Patient has been on fosamax in the past in 2014 by Dr. Jess Ortiz    1. Trigger finger, left ring finger    2. Stage 3a chronic kidney disease ([address]    3. Anxiety    4. Hyperlipidemia, unspecified hyperlipidemia type    5. Mixed stress and urge incontinence    6. Osteopenia, unspecified location    7. Hypertension, unspecified type    8. Impaired fasting glucose             REVIEW OF SYMPTOMS    Review of Systems   Constitutional: Negative for chills and fatigue. HENT: Negative for congestion and sore throat. Respiratory: Negative for shortness of breath and wheezing. Cardiovascular: Negative for chest pain and palpitations. Gastrointestinal: Negative for abdominal pain and nausea. Genitourinary: Negative for frequency and urgency. Neurological: Negative for light-headedness.        PAST MEDICAL HISTORY  Past Medical History:   Diagnosis Date    Anxiety     Cardiomegaly     Cerebral palsy ([address]     Chronic bronchitis ([address]     Compression fracture 2/2013    age of onset unknown    COPD (chronic obstructive pulmonary disease) ([address]     Depression     Environmental allergies     Essential hypertension, benign 1985    white coat Minutes of Exercise per Session: 0 min   Stress:     Feeling of Stress : Not on file   Social Connections:     Frequency of Communication with Friends and Family: Not on file    Frequency of Social Gatherings with Friends and Family: Not on file    Attends [employer] Services: Not on file    Active Member of [address] [place] [address] or Organizations: Not on file    Attends Club or Organization Meetings: Not on file    Marital Status: Not on file   Intimate Partner Violence:     Fear of Current or Ex-Partner: Not on file    Emotionally Abused: Not on file    Physically Abused: Not on file    Sexually Abused: Not on file   Housing Stability:     Unable to Pay for Housing in the Last Year: Not on file    Number of [place] in the Last Year: Not on file    Unstable Housing in the Last Year: Not on file        SURGICAL HISTORY  Past Surgical History:   Procedure Laterality Date    CERVIX BIOPSY  03/2011    cone biopsy    CHOLECYSTECTOMY      COLONOSCOPY  6/2012    DILATION AND CURETTAGE OF UTERUS                   CURRENT MEDICATIONS  Current Outpatient Medications   Medication Sig Dispense Refill    alendronate (FOSAMAX) 70 MG tablet Take 1 tablet by mouth every 7 days Take with a full glass of water upon arising for the day. Consume on an empty stomach at least 30 minutes before the first food, beverage, or medication. Stay upright (do not lie down) for at least 30 minutes. Do not crush or break. 4 tablet 3    ezetimibe (ZETIA) 10 MG tablet TAKE 1 TABLET BY MOUTH EVERY DAY 90 tablet 0    LORazepam (ATIVAN) 0.5 MG tablet Take 1 tablet by mouth 2 times daily as needed for Anxiety for up to 30 days.  30 tablet 0    vitamin C (ASCORBIC ACID) 500 MG tablet Take 500 mg by mouth daily      Cholecalciferol (VITAMIN D3) 125 MCG (5000 UT) TABS Take by mouth      Vibegron (GEMTESA) 75 MG TABS Take by mouth      sertraline (ZOLOFT) 25 MG tablet Take 1 tablet by mouth daily 30 tablet 5    clobetasol propionate 0.05 % LOTN lotion Apply topically 2 times daily 60 g 1    valsartan-hydroCHLOROthiazide (DIOVAN-HCT) 320-12.5 MG per tablet Take 1 tablet by mouth daily 90 tablet 1    atenolol (TENORMIN) 50 MG tablet Take 0.5 tablets by mouth 2 times daily 90 tablet 1    meclizine (ANTIVERT) 25 MG tablet TAKE 1 TABLET BY MOUTH 4 TIMES DAILY AS NEEDED FOR DIZZINESS 30 tablet 1    calcium carbonate-vitamin D (CALTRATE 600+D) 600-400 MG-UNIT TABS per tab Take 1 tablet by mouth 2 times daily. 60 tablet 11     No current facility-administered medications for this visit. ALLERGIES  Allergies   Allergen Reactions    Inderal [Propranolol Hcl]     Statins      Muscle aches    Tramadol Other (See Comments)     headache       PHYSICAL EXAM    /89   Pulse 55   Resp 16   Ht 5' 3\" (1.6 m)   Wt 170 lb (77.1 kg)   SpO2 96%   BMI 30.11 kg/m²     Physical Exam  Constitutional:       Appearance: Normal appearance. HENT:      Head: Normocephalic and atraumatic. Eyes:      Extraocular Movements: Extraocular movements intact. Pupils: Pupils are equal, round, and reactive to light. Cardiovascular:      Rate and Rhythm: Normal rate and regular rhythm. Pulses: Normal pulses. Heart sounds: No murmur heard. No friction rub. No gallop. Pulmonary:      Effort: Pulmonary effort is normal.      Breath sounds: Normal breath sounds. Musculoskeletal:         General: No tenderness. Cervical back: Neck supple. Comments: Gait assisted with FWW   Skin:     General: Skin is warm and dry. Neurological:      General: No focal deficit present. Mental Status: She is alert. Psychiatric:         Mood and Affect: Mood normal.         Behavior: Behavior normal.         ASSESSMENT & PLAN    1. Trigger finger, left ring finger      2. Stage 3a chronic kidney disease ([facility]    - Comprehensive Metabolic Panel; Future    3. Anxiety      4. Hyperlipidemia, unspecified hyperlipidemia type    - Lipid, Fasting; Future    5. Mixed stress and urge incontinence      6. Osteopenia, unspecified location    - alendronate (FOSAMAX) 70 MG tablet; Take 1 tablet by mouth every 7 days Take with a full glass of water upon arising for the day. Consume on an empty stomach at least 30 minutes before the first food, beverage, or medication. Stay upright (do not lie down) for at least 30 minutes. Do not crush or break. Dispense: 4 tablet; Refill: 3    7. Hypertension, unspecified type    - CBC with Auto Differential; Future    8. Impaired fasting glucose      Incontinence imprvoed on vibegeron  F/u lab work  Trigger finger offered plastic surgeon referral she will consider  Frax >3% hip we will restart fosamax she has been on in 2014 unknown amount of time   Anxiety stable no signs of misuse  BP stable    Return in about 3 months (around 10/6/2022).          Electronically signed by Jennifer Buchanan DO on 7/6/2022

## 2022-07-07 LAB
A/G RATIO: 1.7 (ref 1.1–2.2)
ALBUMIN SERPL-MCNC: 4.4 G/DL (ref 3.4–5)
ALP BLD-CCNC: 75 U/L (ref 40–129)
ALT SERPL-CCNC: 12 U/L (ref 10–40)
ANION GAP SERPL CALCULATED.3IONS-SCNC: 14 MMOL/L (ref 3–16)
AST SERPL-CCNC: 20 U/L (ref 15–37)
BASOPHILS ABSOLUTE: 0.1 K/UL (ref 0–0.2)
BASOPHILS RELATIVE PERCENT: 0.7 %
BILIRUB SERPL-MCNC: 0.3 MG/DL (ref 0–1)
BUN BLDV-MCNC: 14 MG/DL (ref 7–20)
CALCIUM SERPL-MCNC: 10.2 MG/DL (ref 8.3–10.6)
CHLORIDE BLD-SCNC: 98 MMOL/L (ref 99–110)
CHOLESTEROL, FASTING: 179 MG/DL (ref 0–199)
CO2: 25 MMOL/L (ref 21–32)
CREAT SERPL-MCNC: 0.9 MG/DL (ref 0.6–1.2)
EOSINOPHILS ABSOLUTE: 0.2 K/UL (ref 0–0.6)
EOSINOPHILS RELATIVE PERCENT: 1.8 %
ESTIMATED AVERAGE GLUCOSE: 131.2 MG/DL
GFR AFRICAN AMERICAN: >60
GFR NON-AFRICAN AMERICAN: 60
GLUCOSE BLD-MCNC: 100 MG/DL (ref 70–99)
HBA1C MFR BLD: 6.2 %
HCT VFR BLD CALC: 42.3 % (ref 36–48)
HDLC SERPL-MCNC: 49 MG/DL (ref 40–60)
HEMOGLOBIN: 14.3 G/DL (ref 12–16)
LDL CHOLESTEROL CALCULATED: 109 MG/DL
LYMPHOCYTES ABSOLUTE: 1.5 K/UL (ref 1–5.1)
LYMPHOCYTES RELATIVE PERCENT: 16.6 %
MCH RBC QN AUTO: 29.3 PG (ref 26–34)
MCHC RBC AUTO-ENTMCNC: 33.8 G/DL (ref 31–36)
MCV RBC AUTO: 86.7 FL (ref 80–100)
MONOCYTES ABSOLUTE: 0.8 K/UL (ref 0–1.3)
MONOCYTES RELATIVE PERCENT: 9.2 %
NEUTROPHILS ABSOLUTE: 6.6 K/UL (ref 1.7–7.7)
NEUTROPHILS RELATIVE PERCENT: 71.7 %
PDW BLD-RTO: 13.1 % (ref 12.4–15.4)
PLATELET # BLD: 231 K/UL (ref 135–450)
PMV BLD AUTO: 8.4 FL (ref 5–10.5)
POTASSIUM SERPL-SCNC: 4.6 MMOL/L (ref 3.5–5.1)
RBC # BLD: 4.88 M/UL (ref 4–5.2)
SODIUM BLD-SCNC: 137 MMOL/L (ref 136–145)
TOTAL PROTEIN: 7 G/DL (ref 6.4–8.2)
TRIGLYCERIDE, FASTING: 103 MG/DL (ref 0–150)
VLDLC SERPL CALC-MCNC: 21 MG/DL
WBC # BLD: 9.2 K/UL (ref 4–11)

## 2022-07-26 DIAGNOSIS — E78.5 HYPERLIPIDEMIA, UNSPECIFIED HYPERLIPIDEMIA TYPE: ICD-10-CM

## 2022-07-26 DIAGNOSIS — F41.9 ANXIETY: ICD-10-CM

## 2022-07-26 DIAGNOSIS — M85.80 OSTEOPENIA, UNSPECIFIED LOCATION: ICD-10-CM

## 2022-07-27 RX ORDER — ALENDRONATE SODIUM 70 MG/1
70 TABLET ORAL
Qty: 12 TABLET | Refills: 0 | Status: SHIPPED | OUTPATIENT
Start: 2022-07-27

## 2022-07-27 RX ORDER — EZETIMIBE 10 MG/1
TABLET ORAL
Qty: 90 TABLET | Refills: 0 | Status: SHIPPED | OUTPATIENT
Start: 2022-07-27

## 2022-07-27 RX ORDER — LORAZEPAM 0.5 MG/1
0.5 TABLET ORAL 2 TIMES DAILY PRN
Qty: 30 TABLET | Refills: 2 | Status: SHIPPED | OUTPATIENT
Start: 2022-07-27 | End: 2022-08-26

## 2022-08-10 DIAGNOSIS — I10 HTN (HYPERTENSION): ICD-10-CM

## 2022-08-10 RX ORDER — ATENOLOL 50 MG/1
25 TABLET ORAL 2 TIMES DAILY
Qty: 90 TABLET | Refills: 1 | Status: SHIPPED | OUTPATIENT
Start: 2022-08-10

## 2022-10-07 DIAGNOSIS — L90.0 LICHEN SCLEROSUS: ICD-10-CM

## 2022-10-07 RX ORDER — CLOBETASOL PROPIONATE 0.5 MG/ML
LOTION TOPICAL
Qty: 60 G | Refills: 1 | Status: SHIPPED | OUTPATIENT
Start: 2022-10-07

## 2022-11-08 ENCOUNTER — OFFICE VISIT (OUTPATIENT)
Dept: FAMILY MEDICINE CLINIC | Age: 82
End: 2022-11-08
Payer: MEDICARE

## 2022-11-08 VITALS
BODY MASS INDEX: 30.83 KG/M2 | DIASTOLIC BLOOD PRESSURE: 68 MMHG | SYSTOLIC BLOOD PRESSURE: 146 MMHG | HEART RATE: 57 BPM | OXYGEN SATURATION: 98 % | HEIGHT: 63 IN | WEIGHT: 174 LBS

## 2022-11-08 DIAGNOSIS — F41.9 ANXIETY: ICD-10-CM

## 2022-11-08 DIAGNOSIS — I10 PRIMARY HYPERTENSION: ICD-10-CM

## 2022-11-08 DIAGNOSIS — R09.82 POST-NASAL DRIP: ICD-10-CM

## 2022-11-08 DIAGNOSIS — Z23 NEED FOR PROPHYLACTIC VACCINATION AND INOCULATION AGAINST INFLUENZA: ICD-10-CM

## 2022-11-08 DIAGNOSIS — J06.9 VIRAL URI: Primary | ICD-10-CM

## 2022-11-08 PROCEDURE — 90694 VACC AIIV4 NO PRSRV 0.5ML IM: CPT | Performed by: STUDENT IN AN ORGANIZED HEALTH CARE EDUCATION/TRAINING PROGRAM

## 2022-11-08 PROCEDURE — 99214 OFFICE O/P EST MOD 30 MIN: CPT | Performed by: STUDENT IN AN ORGANIZED HEALTH CARE EDUCATION/TRAINING PROGRAM

## 2022-11-08 PROCEDURE — G8417 CALC BMI ABV UP PARAM F/U: HCPCS | Performed by: STUDENT IN AN ORGANIZED HEALTH CARE EDUCATION/TRAINING PROGRAM

## 2022-11-08 PROCEDURE — 3074F SYST BP LT 130 MM HG: CPT | Performed by: STUDENT IN AN ORGANIZED HEALTH CARE EDUCATION/TRAINING PROGRAM

## 2022-11-08 PROCEDURE — 1123F ACP DISCUSS/DSCN MKR DOCD: CPT | Performed by: STUDENT IN AN ORGANIZED HEALTH CARE EDUCATION/TRAINING PROGRAM

## 2022-11-08 PROCEDURE — 3078F DIAST BP <80 MM HG: CPT | Performed by: STUDENT IN AN ORGANIZED HEALTH CARE EDUCATION/TRAINING PROGRAM

## 2022-11-08 PROCEDURE — G8399 PT W/DXA RESULTS DOCUMENT: HCPCS | Performed by: STUDENT IN AN ORGANIZED HEALTH CARE EDUCATION/TRAINING PROGRAM

## 2022-11-08 PROCEDURE — 1036F TOBACCO NON-USER: CPT | Performed by: STUDENT IN AN ORGANIZED HEALTH CARE EDUCATION/TRAINING PROGRAM

## 2022-11-08 PROCEDURE — G0008 ADMIN INFLUENZA VIRUS VAC: HCPCS | Performed by: STUDENT IN AN ORGANIZED HEALTH CARE EDUCATION/TRAINING PROGRAM

## 2022-11-08 PROCEDURE — G8427 DOCREV CUR MEDS BY ELIG CLIN: HCPCS | Performed by: STUDENT IN AN ORGANIZED HEALTH CARE EDUCATION/TRAINING PROGRAM

## 2022-11-08 PROCEDURE — 1090F PRES/ABSN URINE INCON ASSESS: CPT | Performed by: STUDENT IN AN ORGANIZED HEALTH CARE EDUCATION/TRAINING PROGRAM

## 2022-11-08 PROCEDURE — G8484 FLU IMMUNIZE NO ADMIN: HCPCS | Performed by: STUDENT IN AN ORGANIZED HEALTH CARE EDUCATION/TRAINING PROGRAM

## 2022-11-08 RX ORDER — AZITHROMYCIN 250 MG/1
250 TABLET, FILM COATED ORAL SEE ADMIN INSTRUCTIONS
Qty: 6 TABLET | Refills: 0 | Status: SHIPPED | OUTPATIENT
Start: 2022-11-08 | End: 2022-11-13

## 2022-11-08 RX ORDER — LEVOCETIRIZINE DIHYDROCHLORIDE 5 MG/1
2.5 TABLET, FILM COATED ORAL NIGHTLY
Qty: 30 TABLET | Refills: 2 | Status: SHIPPED | OUTPATIENT
Start: 2022-11-08 | End: 2023-02-06

## 2022-11-08 RX ORDER — LORAZEPAM 0.5 MG/1
0.5 TABLET ORAL 2 TIMES DAILY PRN
Qty: 30 TABLET | Refills: 2 | Status: SHIPPED | OUTPATIENT
Start: 2022-11-08 | End: 2022-12-08

## 2022-11-08 ASSESSMENT — ENCOUNTER SYMPTOMS
ABDOMINAL PAIN: 0
WHEEZING: 0
SORE THROAT: 0
NAUSEA: 0
SHORTNESS OF BREATH: 0

## 2022-11-08 NOTE — PROGRESS NOTES
11/8/2022    [facility]    Chief Complaint   Patient presents with    3 Month Follow-Up     Pt would like the flu vaccine today    Sinus Problem     Patient reports waking up every morning with congestion and sinus pressure, notes she gets this every year       HPI  History was obtained from patient. Miryam Dyson is a 80 y.o. female with a PMHx as listed below who presents today for 3 month follow up. Patient noticing worsening congestion    Anxiety stable on current regimen. Having resting tremors. Patient is noticing worsening post nasal drip      1. Viral URI    2. Anxiety    3. Primary hypertension    4. Post-nasal drip    5. Need for prophylactic vaccination and inoculation against influenza             REVIEW OF SYMPTOMS    Review of Systems   Constitutional:  Negative for chills and fatigue. HENT:  Negative for congestion and sore throat. Respiratory:  Negative for shortness of breath and wheezing. Cardiovascular:  Negative for chest pain and palpitations. Gastrointestinal:  Negative for abdominal pain and nausea. Genitourinary:  Negative for frequency and urgency. Neurological:  Negative for light-headedness.      PAST MEDICAL HISTORY  Past Medical History:   Diagnosis Date    Anxiety     Cardiomegaly     Cerebral palsy ([address]     Chronic bronchitis (HCC)     Compression fracture 2/2013    age of onset unknown    COPD (chronic obstructive pulmonary disease) ([address]     Depression     Environmental allergies     Essential hypertension, benign 1985    white coat hypertension    Family history of tremor     GERD (gastroesophageal reflux disease)     Hyperlipemia     dx'd 1985    Impaired glucose tolerance     Impaired glucose tolerance     Lichen sclerosus     Mild mitral regurgitation     Mild tricuspid regurgitation     per 7/2014 echo    Moderate aortic insufficiency     Osteoarthritis of lumbar spine     Osteoarthritis of thoracic spine     Osteoporosis     Sleep apnea     Vertigo        FAMILY HISTORY  Family History   Problem Relation Age of Onset    Cancer Brother         colon cancer    Other Brother         meningitis    Heart Disease Mother     Heart Disease Father     Heart Attack Father     Diabetes Sister         type 2    Macular Degen Sister     Other Sister         macular degeneration    Heart Disease Sister     Cancer Daughter         thyroid cancer    Stroke Son     Tremors Brother     Cancer Brother         prostate    Diabetes Sister     Diabetes Sister        SOCIAL HISTORY  Social History     Socioeconomic History    Marital status:      Spouse name: None    Number of children: 2    Years of education: None    Highest education level: None   Occupational History    Occupation: unemployed   Tobacco Use    Smoking status: Never    Smokeless tobacco: Never     Social Determinants of Health     Financial Resource Strain: Low Risk     Difficulty of Paying Living Expenses: Not hard at all   Food Insecurity: No Food Insecurity    Worried About Running Out of Food in the Last Year: Never true    Ran Out of Food in the Last Year: Never true   Physical Activity: Inactive    Days of Exercise per Week: 0 days    Minutes of Exercise per Session: 0 min        SURGICAL HISTORY  Past Surgical History:   Procedure Laterality Date    CERVIX BIOPSY  03/2011    cone biopsy    CHOLECYSTECTOMY      COLONOSCOPY  6/2012    DILATION AND CURETTAGE OF UTERUS                   CURRENT MEDICATIONS  Current Outpatient Medications   Medication Sig Dispense Refill    LORazepam (ATIVAN) 0.5 MG tablet Take 1 tablet by mouth 2 times daily as needed for Anxiety for up to 30 days.  30 tablet 2    azithromycin (ZITHROMAX) 250 MG tablet Take 1 tablet by mouth See Admin Instructions for 5 days 500mg on day 1 followed by 250mg on days 2 - 5 6 tablet 0    levocetirizine (XYZAL) 5 MG tablet Take 0.5 tablets by mouth nightly 30 tablet 2    clobetasol propionate 0.05 % LOTN lotion APPLY TO AFFECTED AREA TWICE A DAY 60 g 1 atenolol (TENORMIN) 50 MG tablet Take 0.5 tablets by mouth in the morning and 0.5 tablets before bedtime. 90 tablet 1    ezetimibe (ZETIA) 10 MG tablet TAKE 1 TABLET BY MOUTH EVERY DAY 90 tablet 0    vitamin C (ASCORBIC ACID) 500 MG tablet Take 500 mg by mouth daily      Vibegron (GEMTESA) 75 MG TABS Take by mouth      sertraline (ZOLOFT) 25 MG tablet Take 1 tablet by mouth daily 30 tablet 5    valsartan-hydroCHLOROthiazide (DIOVAN-HCT) 320-12.5 MG per tablet Take 1 tablet by mouth daily 90 tablet 1    meclizine (ANTIVERT) 25 MG tablet TAKE 1 TABLET BY MOUTH 4 TIMES DAILY AS NEEDED FOR DIZZINESS 30 tablet 1    calcium carbonate-vitamin D (CALTRATE 600+D) 600-400 MG-UNIT TABS per tab Take 1 tablet by mouth 2 times daily. 60 tablet 11    alendronate (FOSAMAX) 70 MG tablet TAKE 1 TABLET BY MOUTH EVERY 7 DAYS TAKE WITH A FULL GLASS OF WATER UPON ARISING FOR THE DAY. CONSUME ON AN EMPTY STOMACH AT LEAST 30 MINUTES BEFORE THE FIRST FOOD, BEVERAGE, OR MEDICATION. STAY UPRIGHT (DO NOT LIE DOWN) FOR AT LEAST 30 MINUTES. DO NOT CRUSH OR BREAK. (Patient not taking: Reported on 11/8/2022) 12 tablet 0    Cholecalciferol (VITAMIN D3) 125 MCG (5000 UT) TABS Take by mouth (Patient not taking: Reported on 11/8/2022)       No current facility-administered medications for this visit. ALLERGIES  Allergies   Allergen Reactions    Inderal [Propranolol Hcl]     Statins      Muscle aches    Tramadol Other (See Comments)     headache       PHYSICAL EXAM    BP (!) 146/68 (Site: Left Upper Arm, Position: Sitting, Cuff Size: Medium Adult)   Pulse 57   Ht 5' 3\" (1.6 m)   Wt 174 lb (78.9 kg)   SpO2 98%   BMI 30.82 kg/m²     Physical Exam  Constitutional:       Appearance: Normal appearance. HENT:      Head: Normocephalic and atraumatic. Eyes:      Extraocular Movements: Extraocular movements intact. Pupils: Pupils are equal, round, and reactive to light. Cardiovascular:      Rate and Rhythm: Normal rate and regular rhythm. Pulses: Normal pulses. Heart sounds: No murmur heard. No friction rub. No gallop. Skin:     General: Skin is warm and dry. Neurological:      General: No focal deficit present. Mental Status: She is alert. Psychiatric:         Mood and Affect: Mood normal.         Behavior: Behavior normal.       ASSESSMENT & PLAN    1. Anxiety    - LORazepam (ATIVAN) 0.5 MG tablet; Take 1 tablet by mouth 2 times daily as needed for Anxiety for up to 30 days. Dispense: 30 tablet; Refill: 2    2. Viral URI    - azithromycin (ZITHROMAX) 250 MG tablet; Take 1 tablet by mouth See Admin Instructions for 5 days 500mg on day 1 followed by 250mg on days 2 - 5  Dispense: 6 tablet; Refill: 0    3. Primary hypertension      4. Post-nasal drip    - levocetirizine (XYZAL) 5 MG tablet; Take 0.5 tablets by mouth nightly  Dispense: 30 tablet; Refill: 2    5. Need for prophylactic vaccination and inoculation against influenza    - Influenza, FLUAD, (age 72 y+), IM, Preservative Free, 0.5 mL    Start allergy medication xyzal  If not improving consider ENT referral    Anxiety stable    Return in about 3 months (around 2/8/2023).          Electronically signed by Melody Bond DO on 11/8/2022

## 2022-11-20 DIAGNOSIS — F41.9 ANXIETY: ICD-10-CM

## 2022-11-20 DIAGNOSIS — I10 PRIMARY HYPERTENSION: ICD-10-CM

## 2022-11-21 RX ORDER — SERTRALINE HYDROCHLORIDE 25 MG/1
25 TABLET, FILM COATED ORAL DAILY
Qty: 30 TABLET | Refills: 5 | Status: SHIPPED | OUTPATIENT
Start: 2022-11-21

## 2022-11-21 RX ORDER — VALSARTAN AND HYDROCHLOROTHIAZIDE 320; 12.5 MG/1; MG/1
TABLET, FILM COATED ORAL
Qty: 90 TABLET | Refills: 1 | Status: SHIPPED | OUTPATIENT
Start: 2022-11-21

## 2023-01-19 DIAGNOSIS — E78.5 HYPERLIPIDEMIA, UNSPECIFIED HYPERLIPIDEMIA TYPE: ICD-10-CM

## 2023-01-19 RX ORDER — EZETIMIBE 10 MG/1
TABLET ORAL
Qty: 90 TABLET | Refills: 0 | OUTPATIENT
Start: 2023-01-19

## 2023-01-27 DIAGNOSIS — E78.5 HYPERLIPIDEMIA, UNSPECIFIED HYPERLIPIDEMIA TYPE: ICD-10-CM

## 2023-01-27 RX ORDER — EZETIMIBE 10 MG/1
TABLET ORAL
Qty: 90 TABLET | Refills: 1 | Status: SHIPPED | OUTPATIENT
Start: 2023-01-27

## 2023-02-03 DIAGNOSIS — F41.9 ANXIETY: ICD-10-CM

## 2023-02-08 ENCOUNTER — OFFICE VISIT (OUTPATIENT)
Dept: FAMILY MEDICINE CLINIC | Age: 83
End: 2023-02-08
Payer: MEDICARE

## 2023-02-08 VITALS
WEIGHT: 175.4 LBS | DIASTOLIC BLOOD PRESSURE: 90 MMHG | HEART RATE: 56 BPM | BODY MASS INDEX: 31.08 KG/M2 | HEIGHT: 63 IN | OXYGEN SATURATION: 96 % | SYSTOLIC BLOOD PRESSURE: 141 MMHG

## 2023-02-08 DIAGNOSIS — J30.2 SEASONAL ALLERGIES: ICD-10-CM

## 2023-02-08 DIAGNOSIS — F41.9 ANXIETY: ICD-10-CM

## 2023-02-08 DIAGNOSIS — J42 CHRONIC BRONCHITIS, UNSPECIFIED CHRONIC BRONCHITIS TYPE (HCC): ICD-10-CM

## 2023-02-08 DIAGNOSIS — I10 PRIMARY HYPERTENSION: Primary | ICD-10-CM

## 2023-02-08 DIAGNOSIS — J01.10 ACUTE NON-RECURRENT FRONTAL SINUSITIS: ICD-10-CM

## 2023-02-08 DIAGNOSIS — N18.31 STAGE 3A CHRONIC KIDNEY DISEASE (HCC): ICD-10-CM

## 2023-02-08 DIAGNOSIS — M85.80 OSTEOPENIA, UNSPECIFIED LOCATION: ICD-10-CM

## 2023-02-08 PROCEDURE — 3023F SPIROM DOC REV: CPT | Performed by: STUDENT IN AN ORGANIZED HEALTH CARE EDUCATION/TRAINING PROGRAM

## 2023-02-08 PROCEDURE — G8417 CALC BMI ABV UP PARAM F/U: HCPCS | Performed by: STUDENT IN AN ORGANIZED HEALTH CARE EDUCATION/TRAINING PROGRAM

## 2023-02-08 PROCEDURE — 99213 OFFICE O/P EST LOW 20 MIN: CPT | Performed by: STUDENT IN AN ORGANIZED HEALTH CARE EDUCATION/TRAINING PROGRAM

## 2023-02-08 PROCEDURE — G8427 DOCREV CUR MEDS BY ELIG CLIN: HCPCS | Performed by: STUDENT IN AN ORGANIZED HEALTH CARE EDUCATION/TRAINING PROGRAM

## 2023-02-08 PROCEDURE — 1090F PRES/ABSN URINE INCON ASSESS: CPT | Performed by: STUDENT IN AN ORGANIZED HEALTH CARE EDUCATION/TRAINING PROGRAM

## 2023-02-08 PROCEDURE — G8399 PT W/DXA RESULTS DOCUMENT: HCPCS | Performed by: STUDENT IN AN ORGANIZED HEALTH CARE EDUCATION/TRAINING PROGRAM

## 2023-02-08 PROCEDURE — 1123F ACP DISCUSS/DSCN MKR DOCD: CPT | Performed by: STUDENT IN AN ORGANIZED HEALTH CARE EDUCATION/TRAINING PROGRAM

## 2023-02-08 PROCEDURE — G8484 FLU IMMUNIZE NO ADMIN: HCPCS | Performed by: STUDENT IN AN ORGANIZED HEALTH CARE EDUCATION/TRAINING PROGRAM

## 2023-02-08 PROCEDURE — 1036F TOBACCO NON-USER: CPT | Performed by: STUDENT IN AN ORGANIZED HEALTH CARE EDUCATION/TRAINING PROGRAM

## 2023-02-08 PROCEDURE — 3077F SYST BP >= 140 MM HG: CPT | Performed by: STUDENT IN AN ORGANIZED HEALTH CARE EDUCATION/TRAINING PROGRAM

## 2023-02-08 PROCEDURE — 3080F DIAST BP >= 90 MM HG: CPT | Performed by: STUDENT IN AN ORGANIZED HEALTH CARE EDUCATION/TRAINING PROGRAM

## 2023-02-08 RX ORDER — CETIRIZINE HYDROCHLORIDE 5 MG/1
5 TABLET ORAL DAILY
Qty: 30 TABLET | Refills: 5 | Status: SHIPPED | OUTPATIENT
Start: 2023-02-08 | End: 2023-08-07

## 2023-02-08 RX ORDER — ALENDRONATE SODIUM 70 MG/1
70 TABLET ORAL
Qty: 12 TABLET | Refills: 0 | Status: SHIPPED | OUTPATIENT
Start: 2023-02-08

## 2023-02-08 RX ORDER — LORAZEPAM 0.5 MG/1
0.5 TABLET ORAL 2 TIMES DAILY PRN
Qty: 30 TABLET | Refills: 2 | Status: SHIPPED | OUTPATIENT
Start: 2023-02-08 | End: 2023-03-10

## 2023-02-08 RX ORDER — ATENOLOL 50 MG/1
25 TABLET ORAL 2 TIMES DAILY
Qty: 90 TABLET | Refills: 1 | Status: SHIPPED | OUTPATIENT
Start: 2023-02-08

## 2023-02-08 RX ORDER — AZITHROMYCIN 250 MG/1
250 TABLET, FILM COATED ORAL SEE ADMIN INSTRUCTIONS
Qty: 6 TABLET | Refills: 0 | Status: SHIPPED | OUTPATIENT
Start: 2023-02-08 | End: 2023-02-13

## 2023-02-08 ASSESSMENT — ENCOUNTER SYMPTOMS
SHORTNESS OF BREATH: 0
WHEEZING: 0
NAUSEA: 0
ABDOMINAL PAIN: 0
SORE THROAT: 0

## 2023-02-08 NOTE — PROGRESS NOTES
2/8/2023    [facility]    Chief Complaint   Patient presents with    Follow-up     3 months    Other     Pt reports is having very vivid dreams and is foggy headed during the day        HPI  History was obtained from patient. Winnie Florian is a 80 y.o. female with a PMHx as listed below who presents today for 3 month follow up on chronic conditions. No acute complaints. Patient complaining of a lot of congestion. Sinus' very irritated, iinflammed. She is noting an ice pack is helping    She note at times abnormal dreams, sees people from her past. Does not act on dreams, no auditory hallucinations  We discussed she is on some medications that can cause abnormal dreams however she does not want to make change in regimen at this time. 1. Primary hypertension    2. Chronic bronchitis, unspecified chronic bronchitis type ([address]    3. Osteopenia, unspecified location    4. Seasonal allergies    5. Stage 3a chronic kidney disease ([address]    6. Acute non-recurrent frontal sinusitis    7. Anxiety             REVIEW OF SYMPTOMS    Review of Systems   Constitutional:  Negative for chills and fatigue. HENT:  Negative for congestion and sore throat. Respiratory:  Negative for shortness of breath and wheezing. Cardiovascular:  Negative for chest pain and palpitations. Gastrointestinal:  Negative for abdominal pain and nausea. Genitourinary:  Negative for frequency and urgency. Neurological:  Negative for light-headedness.      PAST MEDICAL HISTORY  Past Medical History:   Diagnosis Date    Anxiety     Cardiomegaly     Cerebral palsy ([address]     Chronic bronchitis (HCC)     Compression fracture 2/2013    age of onset unknown    COPD (chronic obstructive pulmonary disease) ([address]     Depression     Environmental allergies     Essential hypertension, benign 1985    white coat hypertension    Family history of tremor     GERD (gastroesophageal reflux disease)     Hyperlipemia     dx'd 1985    Impaired glucose tolerance Impaired glucose tolerance     Lichen sclerosus     Mild mitral regurgitation     Mild tricuspid regurgitation     per 7/2014 echo    Moderate aortic insufficiency     Osteoarthritis of lumbar spine     Osteoarthritis of thoracic spine     Osteoporosis     Sleep apnea     Vertigo        FAMILY HISTORY  Family History   Problem Relation Age of Onset    Cancer Brother         colon cancer    Other Brother         meningitis    Heart Disease Mother     Heart Disease Father     Heart Attack Father     Diabetes Sister         type 2    Macular Degen Sister     Other Sister         macular degeneration    Heart Disease Sister     Cancer Daughter         thyroid cancer    Stroke Son     Tremors Brother     Cancer Brother         prostate    Diabetes Sister     Diabetes Sister        SOCIAL HISTORY  Social History     Socioeconomic History    Marital status:     Number of children: 2   Occupational History    Occupation: unemployed   Tobacco Use    Smoking status: Never    Smokeless tobacco: Never     Social Determinants of Health     Financial Resource Strain: Low Risk     Difficulty of Paying Living Expenses: Not hard at all   Food Insecurity: No Food Insecurity    Worried About Running Out of Food in the Last Year: Never true    Ran Out of Food in the Last Year: Never true   Physical Activity: Inactive    Days of Exercise per Week: 0 days    Minutes of Exercise per Session: 0 min        SURGICAL HISTORY  Past Surgical History:   Procedure Laterality Date    CERVIX BIOPSY  03/2011    cone biopsy    CHOLECYSTECTOMY      COLONOSCOPY  6/2012    DILATION AND CURETTAGE OF UTERUS                   CURRENT MEDICATIONS  Current Outpatient Medications   Medication Sig Dispense Refill    alendronate (FOSAMAX) 70 MG tablet Take 1 tablet by mouth every 7 days Take with a full glass of water upon arising for the day. Consume on an empty stomach at least 30 minutes before the first food, beverage, or medication.  Stay upright (do not lie down) for at least 30 minutes. Do not crush or break. 12 tablet 0    atenolol (TENORMIN) 50 MG tablet Take 0.5 tablets by mouth 2 times daily 90 tablet 1    cetirizine (ZYRTEC) 5 MG tablet Take 1 tablet by mouth daily 30 tablet 5    azithromycin (ZITHROMAX) 250 MG tablet Take 1 tablet by mouth See Admin Instructions for 5 days 500mg on day 1 followed by 250mg on days 2 - 5 6 tablet 0    LORazepam (ATIVAN) 0.5 MG tablet Take 1 tablet by mouth 2 times daily as needed for Anxiety for up to 30 days. 30 tablet 2    ezetimibe (ZETIA) 10 MG tablet TAKE 1 TABLET BY MOUTH EVERY DAY 90 tablet 1    valsartan-hydroCHLOROthiazide (DIOVAN-HCT) 320-12.5 MG per tablet TAKE 1 TABLET BY MOUTH EVERY DAY 90 tablet 1    sertraline (ZOLOFT) 25 MG tablet Take 1 tablet by mouth daily 30 tablet 5    clobetasol propionate 0.05 % LOTN lotion APPLY TO AFFECTED AREA TWICE A DAY 60 g 1    vitamin C (ASCORBIC ACID) 500 MG tablet Take 500 mg by mouth daily      Cholecalciferol (VITAMIN D3) 125 MCG (5000 UT) TABS Take by mouth      Vibegron (GEMTESA) 75 MG TABS Take by mouth      meclizine (ANTIVERT) 25 MG tablet TAKE 1 TABLET BY MOUTH 4 TIMES DAILY AS NEEDED FOR DIZZINESS 30 tablet 1    calcium carbonate-vitamin D (CALTRATE 600+D) 600-400 MG-UNIT TABS per tab Take 1 tablet by mouth 2 times daily. 60 tablet 11     No current facility-administered medications for this visit. ALLERGIES  Allergies   Allergen Reactions    Inderal [Propranolol Hcl]     Statins      Muscle aches    Tramadol Other (See Comments)     headache       PHYSICAL EXAM    BP (!) 141/90   Pulse 56   Ht 5' 3\" (1.6 m)   Wt 175 lb 6.4 oz (79.6 kg)   SpO2 96%   BMI 31.07 kg/m²     Physical Exam  Constitutional:       Appearance: Normal appearance. HENT:      Head: Normocephalic and atraumatic. Eyes:      Extraocular Movements: Extraocular movements intact. Pupils: Pupils are equal, round, and reactive to light.    Cardiovascular:      Rate and Rhythm: Normal rate and regular rhythm. Pulses: Normal pulses. Heart sounds: No murmur heard. No friction rub. No gallop. Pulmonary:      Effort: Pulmonary effort is normal.      Breath sounds: Normal breath sounds. Skin:     General: Skin is warm and dry. Neurological:      General: No focal deficit present. Mental Status: She is alert. Psychiatric:         Mood and Affect: Mood normal.         Behavior: Behavior normal.       ASSESSMENT & PLAN    1. Primary hypertension    - atenolol (TENORMIN) 50 MG tablet; Take 0.5 tablets by mouth 2 times daily  Dispense: 90 tablet; Refill: 1    2. Chronic bronchitis, unspecified chronic bronchitis type ([facility]      3. Osteopenia, unspecified location    - alendronate (FOSAMAX) 70 MG tablet; Take 1 tablet by mouth every 7 days Take with a full glass of water upon arising for the day. Consume on an empty stomach at least 30 minutes before the first food, beverage, or medication. Stay upright (do not lie down) for at least 30 minutes. Do not crush or break. Dispense: 12 tablet; Refill: 0    4. Seasonal allergies      5. Stage 3a chronic kidney disease ([facility]      6. Acute non-recurrent frontal sinusitis    - cetirizine (ZYRTEC) 5 MG tablet; Take 1 tablet by mouth daily  Dispense: 30 tablet; Refill: 5  - azithromycin (ZITHROMAX) 250 MG tablet; Take 1 tablet by mouth See Admin Instructions for 5 days 500mg on day 1 followed by 250mg on days 2 - 5  Dispense: 6 tablet; Refill: 0    7. Anxiety  - LORazepam (ATIVAN) 0.5 MG tablet; Take 1 tablet by mouth 2 times daily as needed for Anxiety for up to 30 days. Dispense: 30 tablet; Refill: 2    Continue current anxiety medications  Sinusitis start zpak, decongestants  Bp mild elevation, monitor  Incontinence tolerable on current regimen    Return in about 3 months (around 5/8/2023).          Electronically signed by Aleta Montgomery DO on 2/8/2023

## 2023-03-03 DIAGNOSIS — J01.10 ACUTE NON-RECURRENT FRONTAL SINUSITIS: ICD-10-CM

## 2023-03-03 RX ORDER — CETIRIZINE HYDROCHLORIDE 5 MG/1
TABLET ORAL
Qty: 30 TABLET | Refills: 5 | OUTPATIENT
Start: 2023-03-03

## 2023-05-01 DIAGNOSIS — R09.82 POST-NASAL DRIP: ICD-10-CM

## 2023-05-01 DIAGNOSIS — F41.9 ANXIETY: ICD-10-CM

## 2023-05-01 RX ORDER — LORAZEPAM 0.5 MG/1
0.5 TABLET ORAL 2 TIMES DAILY PRN
Qty: 30 TABLET | Refills: 2 | Status: SHIPPED | OUTPATIENT
Start: 2023-05-01 | End: 2023-05-31

## 2023-05-01 RX ORDER — LEVOCETIRIZINE DIHYDROCHLORIDE 5 MG/1
TABLET, FILM COATED ORAL
Qty: 45 TABLET | Refills: 1 | Status: SHIPPED | OUTPATIENT
Start: 2023-05-01

## 2023-05-20 DIAGNOSIS — I10 PRIMARY HYPERTENSION: ICD-10-CM

## 2023-05-22 RX ORDER — VALSARTAN AND HYDROCHLOROTHIAZIDE 320; 12.5 MG/1; MG/1
TABLET, FILM COATED ORAL
Qty: 90 TABLET | Refills: 1 | Status: SHIPPED | OUTPATIENT
Start: 2023-05-22

## 2023-06-05 ENCOUNTER — OFFICE VISIT (OUTPATIENT)
Dept: FAMILY MEDICINE CLINIC | Age: 83
End: 2023-06-05
Payer: MEDICARE

## 2023-06-05 VITALS
WEIGHT: 174 LBS | DIASTOLIC BLOOD PRESSURE: 68 MMHG | RESPIRATION RATE: 16 BRPM | SYSTOLIC BLOOD PRESSURE: 130 MMHG | OXYGEN SATURATION: 94 % | BODY MASS INDEX: 30.83 KG/M2 | HEIGHT: 63 IN | HEART RATE: 56 BPM

## 2023-06-05 DIAGNOSIS — F41.9 ANXIETY: ICD-10-CM

## 2023-06-05 DIAGNOSIS — R09.82 POST-NASAL DRIP: ICD-10-CM

## 2023-06-05 DIAGNOSIS — R42 VERTIGO: ICD-10-CM

## 2023-06-05 DIAGNOSIS — J01.10 ACUTE NON-RECURRENT FRONTAL SINUSITIS: Primary | ICD-10-CM

## 2023-06-05 PROCEDURE — G8399 PT W/DXA RESULTS DOCUMENT: HCPCS | Performed by: STUDENT IN AN ORGANIZED HEALTH CARE EDUCATION/TRAINING PROGRAM

## 2023-06-05 PROCEDURE — 3078F DIAST BP <80 MM HG: CPT | Performed by: STUDENT IN AN ORGANIZED HEALTH CARE EDUCATION/TRAINING PROGRAM

## 2023-06-05 PROCEDURE — 1036F TOBACCO NON-USER: CPT | Performed by: STUDENT IN AN ORGANIZED HEALTH CARE EDUCATION/TRAINING PROGRAM

## 2023-06-05 PROCEDURE — 99213 OFFICE O/P EST LOW 20 MIN: CPT | Performed by: STUDENT IN AN ORGANIZED HEALTH CARE EDUCATION/TRAINING PROGRAM

## 2023-06-05 PROCEDURE — G8427 DOCREV CUR MEDS BY ELIG CLIN: HCPCS | Performed by: STUDENT IN AN ORGANIZED HEALTH CARE EDUCATION/TRAINING PROGRAM

## 2023-06-05 PROCEDURE — G8417 CALC BMI ABV UP PARAM F/U: HCPCS | Performed by: STUDENT IN AN ORGANIZED HEALTH CARE EDUCATION/TRAINING PROGRAM

## 2023-06-05 PROCEDURE — 1123F ACP DISCUSS/DSCN MKR DOCD: CPT | Performed by: STUDENT IN AN ORGANIZED HEALTH CARE EDUCATION/TRAINING PROGRAM

## 2023-06-05 PROCEDURE — 1090F PRES/ABSN URINE INCON ASSESS: CPT | Performed by: STUDENT IN AN ORGANIZED HEALTH CARE EDUCATION/TRAINING PROGRAM

## 2023-06-05 PROCEDURE — 3074F SYST BP LT 130 MM HG: CPT | Performed by: STUDENT IN AN ORGANIZED HEALTH CARE EDUCATION/TRAINING PROGRAM

## 2023-06-05 RX ORDER — MECLIZINE HYDROCHLORIDE 25 MG/1
25 TABLET ORAL 4 TIMES DAILY PRN
Qty: 30 TABLET | Refills: 1 | Status: SHIPPED | OUTPATIENT
Start: 2023-06-05

## 2023-06-05 RX ORDER — LEVOCETIRIZINE DIHYDROCHLORIDE 5 MG/1
5 TABLET, FILM COATED ORAL NIGHTLY
Qty: 90 TABLET | Refills: 1
Start: 2023-06-05 | End: 2023-12-02

## 2023-06-05 RX ORDER — AZITHROMYCIN 250 MG/1
250 TABLET, FILM COATED ORAL SEE ADMIN INSTRUCTIONS
Qty: 6 TABLET | Refills: 0 | Status: SHIPPED | OUTPATIENT
Start: 2023-06-05 | End: 2023-06-10

## 2023-06-05 NOTE — PROGRESS NOTES
Take 1 tablet by mouth 4 times daily as needed for Dizziness  Dispense: 30 tablet; Refill: 1    2. Acute non-recurrent frontal sinusitis    - azithromycin (ZITHROMAX) 250 MG tablet; Take 1 tablet by mouth See Admin Instructions for 5 days 500mg on day 1 followed by 250mg on days 2 - 5  Dispense: 6 tablet; Refill: 0    3. Post-nasal drip    - levocetirizine (XYZAL) 5 MG tablet; Take 1 tablet by mouth nightly  Dispense: 90 tablet; Refill: 1    4. Anxiety        Discussed meclizine prn use  PND worsening sinus headache start therapy   Anxiety stable    Return in about 3 months (around 9/5/2023).          Electronically signed by Frances Keller DO on 6/8/2023

## 2023-06-06 DIAGNOSIS — N32.81 OVERACTIVE BLADDER: ICD-10-CM

## 2023-06-06 RX ORDER — VIBEGRON 75 MG/1
TABLET, FILM COATED ORAL
Qty: 90 TABLET | Refills: 1 | Status: SHIPPED | OUTPATIENT
Start: 2023-06-06

## 2023-06-07 DIAGNOSIS — J01.10 ACUTE NON-RECURRENT FRONTAL SINUSITIS: ICD-10-CM

## 2023-06-07 RX ORDER — CETIRIZINE HYDROCHLORIDE 5 MG/1
TABLET ORAL
Qty: 90 TABLET | Refills: 1 | Status: SHIPPED | OUTPATIENT
Start: 2023-06-07

## 2023-06-08 ENCOUNTER — TELEPHONE (OUTPATIENT)
Dept: FAMILY MEDICINE CLINIC | Age: 83
End: 2023-06-08

## 2023-06-08 ASSESSMENT — ENCOUNTER SYMPTOMS
NAUSEA: 0
ABDOMINAL PAIN: 0
SORE THROAT: 0
WHEEZING: 0
SHORTNESS OF BREATH: 0

## 2023-06-09 ENCOUNTER — TELEPHONE (OUTPATIENT)
Dept: FAMILY MEDICINE CLINIC | Age: 83
End: 2023-06-09

## 2023-06-09 NOTE — TELEPHONE ENCOUNTER
PA for Lord Mendez was denied.  This is the note provided by cover my meds:   Available Formulary Alternatives: darifenacin extended release, oxybutynin extended release, solifenacin, tolterodine, tolterodine extended release, trospium, trospium extended release, [place]

## 2023-07-06 ENCOUNTER — TELEPHONE (OUTPATIENT)
Dept: FAMILY MEDICINE CLINIC | Age: 83
End: 2023-07-06

## 2023-07-06 DIAGNOSIS — F41.9 ANXIETY: ICD-10-CM

## 2023-07-06 RX ORDER — LORAZEPAM 0.5 MG/1
0.5 TABLET ORAL 2 TIMES DAILY PRN
Qty: 30 TABLET | Refills: 2 | Status: SHIPPED | OUTPATIENT
Start: 2023-07-06 | End: 2023-08-05

## 2023-07-06 NOTE — TELEPHONE ENCOUNTER
Refill requested.      Gali Silva routed conversation to [employer] Clinical Staff 1 hour ago (3:20 PM)     Gali Silva 1 hour ago (3:18 PM)     KV  LORAZEPAM-NOT ON LIST I SEE     LV-6/15  NA-9/14     [facility]         Note

## 2023-07-17 ENCOUNTER — TELEPHONE (OUTPATIENT)
Dept: FAMILY MEDICINE CLINIC | Age: 83
End: 2023-07-17

## 2023-07-17 NOTE — TELEPHONE ENCOUNTER
Spoke to patient per Dr. Jennifer Wren note. She voiced understanding. Already Quit taking the myrbetriq.

## 2023-07-17 NOTE — TELEPHONE ENCOUNTER
----- Message from Luisana Godwin sent at 7/13/2023  1:05 PM EDT -----  Subject: Medication Problem    Medication: mirabegron (MYRBETRIQ) 25 MG TB24  Dosage: 25mg 1 daily  Ordering Provider: Dr. Mars Warren     Question/Problem: Patient is wondering if this medication can be changed   because it is giving her sinus headaches.  Please call to advise      Pharmacy: [address] [place] [address]   [phone] Nonie Osler [phone]    ---------------------------------------------------------------------------  --------------  Halley SuddenValues INFO  [phone]; OK to leave message on voicemail  ---------------------------------------------------------------------------  --------------    SCRIPT ANSWERS  Relationship to Patient: Self

## 2023-07-17 NOTE — TELEPHONE ENCOUNTER
There are not good alternatives. She has a history of falls so other medications would not be good as they cause dizziness.

## 2023-07-18 DIAGNOSIS — M85.80 OSTEOPENIA, UNSPECIFIED LOCATION: ICD-10-CM

## 2023-07-18 RX ORDER — ALENDRONATE SODIUM 70 MG/1
TABLET ORAL
Qty: 12 TABLET | Refills: 1 | Status: SHIPPED | OUTPATIENT
Start: 2023-07-18 | End: 2024-01-14

## 2023-07-20 DIAGNOSIS — E78.5 HYPERLIPIDEMIA, UNSPECIFIED HYPERLIPIDEMIA TYPE: ICD-10-CM

## 2023-07-20 RX ORDER — EZETIMIBE 10 MG/1
TABLET ORAL
Qty: 90 TABLET | Refills: 1 | Status: SHIPPED | OUTPATIENT
Start: 2023-07-20

## 2023-08-14 RX ORDER — SERTRALINE HYDROCHLORIDE 25 MG/1
TABLET, FILM COATED ORAL
Qty: 90 TABLET | Refills: 1 | Status: SHIPPED | OUTPATIENT
Start: 2023-08-14

## 2023-08-21 DIAGNOSIS — I10 PRIMARY HYPERTENSION: ICD-10-CM

## 2023-08-22 RX ORDER — ATENOLOL 50 MG/1
TABLET ORAL
Qty: 90 TABLET | Refills: 1 | Status: SHIPPED | OUTPATIENT
Start: 2023-08-22

## 2023-09-11 ENCOUNTER — TELEPHONE (OUTPATIENT)
Dept: FAMILY MEDICINE CLINIC | Age: 83
End: 2023-09-11

## 2023-09-11 DIAGNOSIS — F41.9 ANXIETY: Primary | ICD-10-CM

## 2023-09-11 NOTE — TELEPHONE ENCOUNTER
LORAZEPAM-NOT ON LIST I SEE-PT HAS TAKEN A BIT MORE THAN USUAL DUE TO A LOT GOING ON-HER SON IN [facility] HAD A STROKE AND ISN'T DOING TO GOOD.  PLEASE CALL AND LET PT KNOW IF SHE CAN GET THIS FILLED    LV-6/15  NA-9/15    [facility]

## 2023-09-12 RX ORDER — LORAZEPAM 0.5 MG/1
0.5 TABLET ORAL EVERY 12 HOURS PRN
Qty: 30 TABLET | Refills: 0 | Status: SHIPPED | OUTPATIENT
Start: 2023-09-12 | End: 2023-09-27

## 2023-09-14 ENCOUNTER — OFFICE VISIT (OUTPATIENT)
Dept: FAMILY MEDICINE CLINIC | Age: 83
End: 2023-09-14
Payer: MEDICARE

## 2023-09-14 VITALS
HEART RATE: 58 BPM | WEIGHT: 169.5 LBS | SYSTOLIC BLOOD PRESSURE: 126 MMHG | HEIGHT: 63 IN | OXYGEN SATURATION: 95 % | DIASTOLIC BLOOD PRESSURE: 64 MMHG | BODY MASS INDEX: 30.03 KG/M2

## 2023-09-14 DIAGNOSIS — F41.9 ANXIETY: Primary | ICD-10-CM

## 2023-09-14 DIAGNOSIS — J01.10 ACUTE NON-RECURRENT FRONTAL SINUSITIS: ICD-10-CM

## 2023-09-14 DIAGNOSIS — Z23 ENCOUNTER FOR IMMUNIZATION: ICD-10-CM

## 2023-09-14 PROCEDURE — 3078F DIAST BP <80 MM HG: CPT | Performed by: STUDENT IN AN ORGANIZED HEALTH CARE EDUCATION/TRAINING PROGRAM

## 2023-09-14 PROCEDURE — 99214 OFFICE O/P EST MOD 30 MIN: CPT | Performed by: STUDENT IN AN ORGANIZED HEALTH CARE EDUCATION/TRAINING PROGRAM

## 2023-09-14 PROCEDURE — 1036F TOBACCO NON-USER: CPT | Performed by: STUDENT IN AN ORGANIZED HEALTH CARE EDUCATION/TRAINING PROGRAM

## 2023-09-14 PROCEDURE — 1090F PRES/ABSN URINE INCON ASSESS: CPT | Performed by: STUDENT IN AN ORGANIZED HEALTH CARE EDUCATION/TRAINING PROGRAM

## 2023-09-14 PROCEDURE — 3074F SYST BP LT 130 MM HG: CPT | Performed by: STUDENT IN AN ORGANIZED HEALTH CARE EDUCATION/TRAINING PROGRAM

## 2023-09-14 PROCEDURE — G8427 DOCREV CUR MEDS BY ELIG CLIN: HCPCS | Performed by: STUDENT IN AN ORGANIZED HEALTH CARE EDUCATION/TRAINING PROGRAM

## 2023-09-14 PROCEDURE — G8417 CALC BMI ABV UP PARAM F/U: HCPCS | Performed by: STUDENT IN AN ORGANIZED HEALTH CARE EDUCATION/TRAINING PROGRAM

## 2023-09-14 PROCEDURE — 90694 VACC AIIV4 NO PRSRV 0.5ML IM: CPT | Performed by: STUDENT IN AN ORGANIZED HEALTH CARE EDUCATION/TRAINING PROGRAM

## 2023-09-14 PROCEDURE — G8399 PT W/DXA RESULTS DOCUMENT: HCPCS | Performed by: STUDENT IN AN ORGANIZED HEALTH CARE EDUCATION/TRAINING PROGRAM

## 2023-09-14 PROCEDURE — 1123F ACP DISCUSS/DSCN MKR DOCD: CPT | Performed by: STUDENT IN AN ORGANIZED HEALTH CARE EDUCATION/TRAINING PROGRAM

## 2023-09-14 PROCEDURE — G0008 ADMIN INFLUENZA VIRUS VAC: HCPCS | Performed by: STUDENT IN AN ORGANIZED HEALTH CARE EDUCATION/TRAINING PROGRAM

## 2023-09-14 RX ORDER — AZITHROMYCIN 250 MG/1
250 TABLET, FILM COATED ORAL SEE ADMIN INSTRUCTIONS
Qty: 6 TABLET | Refills: 0 | Status: SHIPPED | OUTPATIENT
Start: 2023-09-14 | End: 2023-09-19

## 2023-09-14 ASSESSMENT — ENCOUNTER SYMPTOMS
SHORTNESS OF BREATH: 0
NAUSEA: 0
WHEEZING: 0
SORE THROAT: 0
ABDOMINAL PAIN: 0

## 2023-09-14 NOTE — PROGRESS NOTES
9/20/2023    Anh Kirk    Chief Complaint   Patient presents with    3 Month Follow-Up     HTN       HPI  History was obtained from pateint. Lobo Rogers is a 80 y.o. female with a PMHx as listed below who presents today for follow up on chronic conditions. Cgronic anxiety, son recently had a stroke. Son is living in [place]. Panic attacks are increasing in frequency. She used to follow counselor in the past which did help. She tries to relieve her stress through [employer],  comforts,     Patient still having a little illness. Headache due to compression     Bp   Anxiety worsening, has followed with counseling in the past    No falls since last appt. 1. Anxiety    2. Encounter for immunization    3. Acute non-recurrent frontal sinusitis             REVIEW OF SYMPTOMS    Review of Systems   Constitutional:  Negative for chills and fatigue. HENT:  Negative for congestion and sore throat. Respiratory:  Negative for shortness of breath and wheezing. Cardiovascular:  Negative for chest pain and palpitations. Gastrointestinal:  Negative for abdominal pain and nausea. Genitourinary:  Negative for frequency and urgency. Neurological:  Negative for light-headedness.        PAST MEDICAL HISTORY  Past Medical History:   Diagnosis Date    Anxiety     Cardiomegaly     Cerebral palsy ([address])     Chronic bronchitis (HCC)     Compression fracture 2/2013    age of onset unknown    COPD (chronic obstructive pulmonary disease) ([address])     Depression     Environmental allergies     Essential hypertension, benign 1985    white coat hypertension    Family history of tremor     GERD (gastroesophageal reflux disease)     Hyperlipemia     dx'd 1985    Impaired glucose tolerance     Impaired glucose tolerance     Lichen sclerosus     Mild mitral regurgitation     Mild tricuspid regurgitation     per 7/2014 echo    Moderate aortic insufficiency     Osteoarthritis of lumbar spine     Osteoarthritis of thoracic spine

## 2023-09-18 ENCOUNTER — TELEPHONE (OUTPATIENT)
Dept: INTERNAL MEDICINE CLINIC | Age: 83
End: 2023-09-18

## 2023-09-18 NOTE — TELEPHONE ENCOUNTER
Patient called our office and was referred to 's office for counseling and made an appt. She was scheduled and decided she does not want to be seen for counseling.  I will close the referral.

## 2023-09-27 ENCOUNTER — APPOINTMENT (OUTPATIENT)
Dept: CT IMAGING | Age: 83
DRG: 208 | End: 2023-09-27
Payer: MEDICARE

## 2023-09-27 ENCOUNTER — APPOINTMENT (OUTPATIENT)
Dept: GENERAL RADIOLOGY | Age: 83
DRG: 208 | End: 2023-09-27
Attending: EMERGENCY MEDICINE
Payer: MEDICARE

## 2023-09-27 ENCOUNTER — HOSPITAL ENCOUNTER (INPATIENT)
Age: 83
DRG: 208 | End: 2023-09-27
Attending: EMERGENCY MEDICINE | Admitting: STUDENT IN AN ORGANIZED HEALTH CARE EDUCATION/TRAINING PROGRAM
Payer: MEDICARE

## 2023-09-27 DIAGNOSIS — E87.21 ACUTE LACTIC ACIDOSIS: ICD-10-CM

## 2023-09-27 DIAGNOSIS — R41.82 ACUTE ALTERATION IN MENTAL STATUS: Primary | ICD-10-CM

## 2023-09-27 DIAGNOSIS — S09.90XA CLOSED HEAD INJURY, INITIAL ENCOUNTER: ICD-10-CM

## 2023-09-27 LAB
ALBUMIN SERPL-MCNC: 4.3 GM/DL (ref 3.4–5)
ALP BLD-CCNC: 69 IU/L (ref 40–129)
ALT SERPL-CCNC: 18 U/L (ref 10–40)
ANION GAP SERPL CALCULATED.3IONS-SCNC: 18 MMOL/L (ref 4–16)
AST SERPL-CCNC: 25 IU/L (ref 15–37)
BACTERIA: NEGATIVE /HPF
BASE EXCESS: 2 (ref 0–2.4)
BILIRUB SERPL-MCNC: 0.3 MG/DL (ref 0–1)
BILIRUBIN URINE: NEGATIVE MG/DL
BLOOD, URINE: ABNORMAL
BUN SERPL-MCNC: 13 MG/DL (ref 6–23)
CALCIUM SERPL-MCNC: 9.2 MG/DL (ref 8.3–10.6)
CARBON MONOXIDE, BLOOD: 1.9 % (ref 0–5)
CHLORIDE BLD-SCNC: 93 MMOL/L (ref 99–110)
CHP ED QC CHECK: YES
CLARITY: CLEAR
CO2 CONTENT: 22.5 MMOL/L (ref 19–24)
CO2: 21 MMOL/L (ref 21–32)
COLOR: YELLOW
COMMENT: ABNORMAL
CREAT SERPL-MCNC: 1.1 MG/DL (ref 0.6–1.1)
GFR SERPL CREATININE-BSD FRML MDRD: 50 ML/MIN/1.73M2
GLUCOSE BLD-MCNC: 175 MG/DL
GLUCOSE BLD-MCNC: 175 MG/DL (ref 70–99)
GLUCOSE SERPL-MCNC: 182 MG/DL (ref 70–99)
GLUCOSE, URINE: NEGATIVE MG/DL
HCO3 ARTERIAL: 21.5 MMOL/L (ref 18–23)
HCT VFR BLD CALC: 47 % (ref 37–47)
HEMOGLOBIN: 15.3 GM/DL (ref 12.5–16)
HYALINE CASTS: 2 /LPF
INR BLD: 1.1 INDEX
KETONES, URINE: NEGATIVE MG/DL
LACTIC ACID, SEPSIS: 7.9 MMOL/L (ref 0.5–1.9)
LEUKOCYTE ESTERASE, URINE: NEGATIVE
MAGNESIUM: 2.1 MG/DL (ref 1.8–2.4)
MCH RBC QN AUTO: 29.3 PG (ref 27–31)
MCHC RBC AUTO-ENTMCNC: 32.6 % (ref 32–36)
MCV RBC AUTO: 89.9 FL (ref 78–100)
METHEMOGLOBIN ARTERIAL: 1.5 %
NITRITE URINE, QUANTITATIVE: NEGATIVE
O2 SATURATION: 95.7 % (ref 96–97)
PCO2 ARTERIAL: 31 MMHG (ref 32–45)
PDW BLD-RTO: 11.8 % (ref 11.7–14.9)
PH BLOOD: 7.45 (ref 7.34–7.45)
PH, URINE: 7 (ref 5–8)
PLATELET # BLD: 258 K/CU MM (ref 140–440)
PMV BLD AUTO: 10.1 FL (ref 7.5–11.1)
PO2 ARTERIAL: 100 MMHG (ref 75–100)
POTASSIUM SERPL-SCNC: 3.6 MMOL/L (ref 3.5–5.1)
PRO-BNP: 801.4 PG/ML
PROTEIN UA: >300 MG/DL
PROTHROMBIN TIME: 14.4 SECONDS (ref 11.7–14.5)
RBC # BLD: 5.23 M/CU MM (ref 4.2–5.4)
RBC URINE: 2 /HPF (ref 0–6)
SODIUM BLD-SCNC: 132 MMOL/L (ref 135–145)
SPECIFIC GRAVITY UA: 1.02 (ref 1–1.03)
SQUAMOUS EPITHELIAL: <1 /HPF
TOTAL PROTEIN: 7.5 GM/DL (ref 6.4–8.2)
TRICHOMONAS: NORMAL /HPF
TROPONIN T: <0.01 NG/ML
UROBILINOGEN, URINE: 0.2 MG/DL (ref 0.2–1)
WBC # BLD: 11 K/CU MM (ref 4–10.5)
WBC UA: 2 /HPF (ref 0–5)

## 2023-09-27 PROCEDURE — 87070 CULTURE OTHR SPECIMN AEROBIC: CPT

## 2023-09-27 PROCEDURE — 85027 COMPLETE CBC AUTOMATED: CPT

## 2023-09-27 PROCEDURE — 87040 BLOOD CULTURE FOR BACTERIA: CPT

## 2023-09-27 PROCEDURE — 80053 COMPREHEN METABOLIC PANEL: CPT

## 2023-09-27 PROCEDURE — 83605 ASSAY OF LACTIC ACID: CPT

## 2023-09-27 PROCEDURE — 87081 CULTURE SCREEN ONLY: CPT

## 2023-09-27 PROCEDURE — 6360000002 HC RX W HCPCS: Performed by: EMERGENCY MEDICINE

## 2023-09-27 PROCEDURE — 82962 GLUCOSE BLOOD TEST: CPT

## 2023-09-27 PROCEDURE — 94002 VENT MGMT INPAT INIT DAY: CPT

## 2023-09-27 PROCEDURE — 5A1935Z RESPIRATORY VENTILATION, LESS THAN 24 CONSECUTIVE HOURS: ICD-10-PCS | Performed by: SPECIALIST

## 2023-09-27 PROCEDURE — 2500000003 HC RX 250 WO HCPCS: Performed by: EMERGENCY MEDICINE

## 2023-09-27 PROCEDURE — 2580000003 HC RX 258: Performed by: EMERGENCY MEDICINE

## 2023-09-27 PROCEDURE — 99285 EMERGENCY DEPT VISIT HI MDM: CPT

## 2023-09-27 PROCEDURE — 71045 X-RAY EXAM CHEST 1 VIEW: CPT

## 2023-09-27 PROCEDURE — 87205 SMEAR GRAM STAIN: CPT

## 2023-09-27 PROCEDURE — 72125 CT NECK SPINE W/O DYE: CPT

## 2023-09-27 PROCEDURE — 84484 ASSAY OF TROPONIN QUANT: CPT

## 2023-09-27 PROCEDURE — 84145 PROCALCITONIN (PCT): CPT

## 2023-09-27 PROCEDURE — 70450 CT HEAD/BRAIN W/O DYE: CPT

## 2023-09-27 PROCEDURE — 81001 URINALYSIS AUTO W/SCOPE: CPT

## 2023-09-27 PROCEDURE — 70498 CT ANGIOGRAPHY NECK: CPT

## 2023-09-27 PROCEDURE — 85610 PROTHROMBIN TIME: CPT

## 2023-09-27 PROCEDURE — 83880 ASSAY OF NATRIURETIC PEPTIDE: CPT

## 2023-09-27 PROCEDURE — 93005 ELECTROCARDIOGRAM TRACING: CPT | Performed by: EMERGENCY MEDICINE

## 2023-09-27 PROCEDURE — 2000000000 HC ICU R&B

## 2023-09-27 PROCEDURE — 6360000002 HC RX W HCPCS

## 2023-09-27 PROCEDURE — 83735 ASSAY OF MAGNESIUM: CPT

## 2023-09-27 PROCEDURE — 6360000004 HC RX CONTRAST MEDICATION: Performed by: EMERGENCY MEDICINE

## 2023-09-27 PROCEDURE — 87150 DNA/RNA AMPLIFIED PROBE: CPT

## 2023-09-27 PROCEDURE — 82803 BLOOD GASES ANY COMBINATION: CPT

## 2023-09-27 PROCEDURE — 2500000003 HC RX 250 WO HCPCS

## 2023-09-27 RX ORDER — ONDANSETRON 2 MG/ML
4 INJECTION INTRAMUSCULAR; INTRAVENOUS EVERY 6 HOURS PRN
Status: DISCONTINUED | OUTPATIENT
Start: 2023-09-27 | End: 2023-10-04 | Stop reason: HOSPADM

## 2023-09-27 RX ORDER — SODIUM CHLORIDE 0.9 % (FLUSH) 0.9 %
5-40 SYRINGE (ML) INJECTION 2 TIMES DAILY
Status: DISCONTINUED | OUTPATIENT
Start: 2023-09-28 | End: 2023-10-04 | Stop reason: HOSPADM

## 2023-09-27 RX ORDER — ENOXAPARIN SODIUM 100 MG/ML
40 INJECTION SUBCUTANEOUS DAILY
Status: DISCONTINUED | OUTPATIENT
Start: 2023-09-28 | End: 2023-10-04 | Stop reason: HOSPADM

## 2023-09-27 RX ORDER — ROCURONIUM BROMIDE 10 MG/ML
INJECTION, SOLUTION INTRAVENOUS DAILY PRN
Status: COMPLETED | OUTPATIENT
Start: 2023-09-27 | End: 2023-09-27

## 2023-09-27 RX ORDER — FENTANYL CITRATE 50 UG/ML
50 INJECTION, SOLUTION INTRAMUSCULAR; INTRAVENOUS ONCE
Status: COMPLETED | OUTPATIENT
Start: 2023-09-27 | End: 2023-09-27

## 2023-09-27 RX ORDER — PROPOFOL 10 MG/ML
INJECTION, EMULSION INTRAVENOUS
Status: COMPLETED
Start: 2023-09-27 | End: 2023-09-27

## 2023-09-27 RX ORDER — ACETAMINOPHEN 650 MG/1
650 SUPPOSITORY RECTAL EVERY 6 HOURS PRN
Status: DISCONTINUED | OUTPATIENT
Start: 2023-09-27 | End: 2023-10-04 | Stop reason: HOSPADM

## 2023-09-27 RX ORDER — FENTANYL CITRATE-0.9 % NACL/PF 10 MCG/ML
25-200 PLASTIC BAG, INJECTION (ML) INTRAVENOUS CONTINUOUS
Status: DISCONTINUED | OUTPATIENT
Start: 2023-09-27 | End: 2023-09-28

## 2023-09-27 RX ORDER — ONDANSETRON 4 MG/1
4 TABLET, ORALLY DISINTEGRATING ORAL EVERY 8 HOURS PRN
Status: DISCONTINUED | OUTPATIENT
Start: 2023-09-27 | End: 2023-10-04 | Stop reason: HOSPADM

## 2023-09-27 RX ORDER — SODIUM CHLORIDE 9 MG/ML
INJECTION, SOLUTION INTRAVENOUS PRN
Status: DISCONTINUED | OUTPATIENT
Start: 2023-09-27 | End: 2023-10-04 | Stop reason: HOSPADM

## 2023-09-27 RX ORDER — ACETAMINOPHEN 325 MG/1
650 TABLET ORAL EVERY 6 HOURS PRN
Status: DISCONTINUED | OUTPATIENT
Start: 2023-09-27 | End: 2023-10-04 | Stop reason: HOSPADM

## 2023-09-27 RX ORDER — PROPOFOL 10 MG/ML
5-50 INJECTION, EMULSION INTRAVENOUS CONTINUOUS
Status: DISCONTINUED | OUTPATIENT
Start: 2023-09-27 | End: 2023-09-27

## 2023-09-27 RX ORDER — ETOMIDATE 2 MG/ML
INJECTION INTRAVENOUS DAILY PRN
Status: COMPLETED | OUTPATIENT
Start: 2023-09-27 | End: 2023-09-27

## 2023-09-27 RX ORDER — POLYETHYLENE GLYCOL 3350 17 G/17G
17 POWDER, FOR SOLUTION ORAL DAILY PRN
Status: DISCONTINUED | OUTPATIENT
Start: 2023-09-27 | End: 2023-10-04 | Stop reason: HOSPADM

## 2023-09-27 RX ORDER — SODIUM CHLORIDE 0.9 % (FLUSH) 0.9 %
5-40 SYRINGE (ML) INJECTION PRN
Status: DISCONTINUED | OUTPATIENT
Start: 2023-09-27 | End: 2023-10-04 | Stop reason: HOSPADM

## 2023-09-27 RX ADMIN — PROPOFOL 1000 MG: 10 INJECTION, EMULSION INTRAVENOUS at 19:15

## 2023-09-27 RX ADMIN — IOPAMIDOL 75 ML: 755 INJECTION, SOLUTION INTRAVENOUS at 21:25

## 2023-09-27 RX ADMIN — PROPOFOL 10 MCG/KG/MIN: 10 INJECTION, EMULSION INTRAVENOUS at 19:35

## 2023-09-27 RX ADMIN — FENTANYL CITRATE 50 MCG: 50 INJECTION INTRAMUSCULAR; INTRAVENOUS at 19:43

## 2023-09-27 RX ADMIN — ROCURONIUM BROMIDE 70 MG: 10 INJECTION INTRAVENOUS at 18:59

## 2023-09-27 RX ADMIN — FENTANYL CITRATE 50 MCG/HR: 0.05 INJECTION, SOLUTION INTRAMUSCULAR; INTRAVENOUS at 21:52

## 2023-09-27 RX ADMIN — FENTANYL CITRATE 50 MCG: 50 INJECTION INTRAMUSCULAR; INTRAVENOUS at 21:36

## 2023-09-27 RX ADMIN — ETOMIDATE 20 MG: 2 INJECTION, SOLUTION INTRAVENOUS at 18:58

## 2023-09-27 NOTE — SEDATION DOCUMENTATION
Xray phoned at this time for stat chest xray to verify tu\be placement.  Pt to then go to CT for scans per Dr. Mike Coleman

## 2023-09-27 NOTE — ED TRIAGE NOTES
Pt had unwitnessed fall in restroom. Pt unresponsive when  went into restroom. Pt unresponsive on arrival, purposeful movements of upper extremities, withdrawals from pain on lower extremities. Pt has L forehead hematoma. Pt does not take blood thinners per medication list available in Epic.   en route to hospital, will ask for more hx and med list on arrival.

## 2023-09-28 ENCOUNTER — APPOINTMENT (OUTPATIENT)
Dept: CT IMAGING | Age: 83
DRG: 208 | End: 2023-09-28
Payer: MEDICARE

## 2023-09-28 PROBLEM — R55 VASOVAGAL SYNCOPE: Status: ACTIVE | Noted: 2023-09-28

## 2023-09-28 PROBLEM — G93.40 ACUTE ENCEPHALOPATHY: Status: ACTIVE | Noted: 2023-09-28

## 2023-09-28 LAB
AMPHETAMINES: NEGATIVE
ANION GAP SERPL CALCULATED.3IONS-SCNC: 13 MMOL/L (ref 4–16)
BARBITURATE SCREEN URINE: NEGATIVE
BASE EXCESS MIXED: 1.1 (ref 0–2.3)
BASOPHILS ABSOLUTE: 0 K/CU MM
BASOPHILS RELATIVE PERCENT: 0.1 % (ref 0–1)
BENZODIAZEPINE SCREEN, URINE: ABNORMAL
BUN SERPL-MCNC: 13 MG/DL (ref 6–23)
CALCIUM SERPL-MCNC: 9.1 MG/DL (ref 8.3–10.6)
CANNABINOID SCREEN URINE: NEGATIVE
CARBON MONOXIDE, BLOOD: 2 % (ref 0–5)
CHLORIDE BLD-SCNC: 97 MMOL/L (ref 99–110)
CO2 CONTENT: 23.4 MMOL/L (ref 19–24)
CO2: 24 MMOL/L (ref 21–32)
COCAINE METABOLITE: NEGATIVE
COMMENT: ABNORMAL
CREAT SERPL-MCNC: 1.1 MG/DL (ref 0.6–1.1)
DIFFERENTIAL TYPE: ABNORMAL
EKG ATRIAL RATE: 86 BPM
EKG DIAGNOSIS: NORMAL
EKG P AXIS: 79 DEGREES
EKG P-R INTERVAL: 212 MS
EKG Q-T INTERVAL: 414 MS
EKG QRS DURATION: 122 MS
EKG QTC CALCULATION (BAZETT): 495 MS
EKG R AXIS: -38 DEGREES
EKG T AXIS: 97 DEGREES
EKG VENTRICULAR RATE: 86 BPM
EOSINOPHILS ABSOLUTE: 0 K/CU MM
EOSINOPHILS RELATIVE PERCENT: 0.1 % (ref 0–3)
FENTANYL URINE: ABNORMAL
GFR SERPL CREATININE-BSD FRML MDRD: 50 ML/MIN/1.73M2
GLUCOSE SERPL-MCNC: 153 MG/DL (ref 70–99)
HCO3 ARTERIAL: 22.6 MMOL/L (ref 18–23)
HCT VFR BLD CALC: 43.7 % (ref 37–47)
HEMOGLOBIN: 14.7 GM/DL (ref 12.5–16)
IMMATURE NEUTROPHIL %: 0.3 % (ref 0–0.43)
LACTATE: 3.3 MMOL/L (ref 0.5–1.9)
LACTIC ACID, SEPSIS: 3.4 MMOL/L (ref 0.5–1.9)
LYMPHOCYTES ABSOLUTE: 1 K/CU MM
LYMPHOCYTES RELATIVE PERCENT: 7.1 % (ref 24–44)
MAGNESIUM: 2 MG/DL (ref 1.8–2.4)
MCH RBC QN AUTO: 29.6 PG (ref 27–31)
MCHC RBC AUTO-ENTMCNC: 33.6 % (ref 32–36)
MCV RBC AUTO: 88.1 FL (ref 78–100)
METHEMOGLOBIN ARTERIAL: 1.6 %
MONOCYTES ABSOLUTE: 1.2 K/CU MM
MONOCYTES RELATIVE PERCENT: 8.4 % (ref 0–4)
NUCLEATED RBC %: 0 %
O2 SATURATION: 95.8 % (ref 96–97)
OPIATES, URINE: NEGATIVE
OXYCODONE: NEGATIVE
PCO2 ARTERIAL: 27 MMHG (ref 32–45)
PDW BLD-RTO: 11.9 % (ref 11.7–14.9)
PH BLOOD: 7.53 (ref 7.34–7.45)
PHOSPHORUS: 3.2 MG/DL (ref 2.5–4.9)
PLATELET # BLD: 216 K/CU MM (ref 140–440)
PMV BLD AUTO: 10.1 FL (ref 7.5–11.1)
PO2 ARTERIAL: 116 MMHG (ref 75–100)
POTASSIUM SERPL-SCNC: 4 MMOL/L (ref 3.5–5.1)
PROCALCITONIN SERPL-MCNC: 0.04 NG/ML
RBC # BLD: 4.96 M/CU MM (ref 4.2–5.4)
SEGMENTED NEUTROPHILS ABSOLUTE COUNT: 12.2 K/CU MM
SEGMENTED NEUTROPHILS RELATIVE PERCENT: 84 % (ref 36–66)
SODIUM BLD-SCNC: 134 MMOL/L (ref 135–145)
T4 FREE SERPL-MCNC: 1.26 NG/DL (ref 0.9–1.8)
TOTAL IMMATURE NEUTOROPHIL: 0.05 K/CU MM
TOTAL NUCLEATED RBC: 0 K/CU MM
TSH SERPL DL<=0.005 MIU/L-ACNC: 3.37 UIU/ML (ref 0.27–4.2)
WBC # BLD: 14.6 K/CU MM (ref 4–10.5)

## 2023-09-28 PROCEDURE — A4216 STERILE WATER/SALINE, 10 ML: HCPCS

## 2023-09-28 PROCEDURE — 80048 BASIC METABOLIC PNL TOTAL CA: CPT

## 2023-09-28 PROCEDURE — 6370000000 HC RX 637 (ALT 250 FOR IP): Performed by: SPECIALIST

## 2023-09-28 PROCEDURE — 82803 BLOOD GASES ANY COMBINATION: CPT

## 2023-09-28 PROCEDURE — 2000000000 HC ICU R&B

## 2023-09-28 PROCEDURE — C9113 INJ PANTOPRAZOLE SODIUM, VIA: HCPCS

## 2023-09-28 PROCEDURE — 6360000002 HC RX W HCPCS: Performed by: NURSE PRACTITIONER

## 2023-09-28 PROCEDURE — 70450 CT HEAD/BRAIN W/O DYE: CPT

## 2023-09-28 PROCEDURE — 2500000003 HC RX 250 WO HCPCS

## 2023-09-28 PROCEDURE — 2700000000 HC OXYGEN THERAPY PER DAY

## 2023-09-28 PROCEDURE — 95717 EEG PHYS/QHP 2-12 HR W/O VID: CPT | Performed by: STUDENT IN AN ORGANIZED HEALTH CARE EDUCATION/TRAINING PROGRAM

## 2023-09-28 PROCEDURE — 94761 N-INVAS EAR/PLS OXIMETRY MLT: CPT

## 2023-09-28 PROCEDURE — 93010 ELECTROCARDIOGRAM REPORT: CPT | Performed by: INTERNAL MEDICINE

## 2023-09-28 PROCEDURE — 6360000002 HC RX W HCPCS: Performed by: STUDENT IN AN ORGANIZED HEALTH CARE EDUCATION/TRAINING PROGRAM

## 2023-09-28 PROCEDURE — 99223 1ST HOSP IP/OBS HIGH 75: CPT | Performed by: STUDENT IN AN ORGANIZED HEALTH CARE EDUCATION/TRAINING PROGRAM

## 2023-09-28 PROCEDURE — 51702 INSERT TEMP BLADDER CATH: CPT

## 2023-09-28 PROCEDURE — 95819 EEG AWAKE AND ASLEEP: CPT

## 2023-09-28 PROCEDURE — 89220 SPUTUM SPECIMEN COLLECTION: CPT

## 2023-09-28 PROCEDURE — 95705 EEG W/O VID 2-12 HR UNMNTR: CPT

## 2023-09-28 PROCEDURE — 83735 ASSAY OF MAGNESIUM: CPT

## 2023-09-28 PROCEDURE — 84443 ASSAY THYROID STIM HORMONE: CPT

## 2023-09-28 PROCEDURE — 2580000003 HC RX 258

## 2023-09-28 PROCEDURE — 6360000002 HC RX W HCPCS

## 2023-09-28 PROCEDURE — 80307 DRUG TEST PRSMV CHEM ANLYZR: CPT

## 2023-09-28 PROCEDURE — 84100 ASSAY OF PHOSPHORUS: CPT

## 2023-09-28 PROCEDURE — 94003 VENT MGMT INPAT SUBQ DAY: CPT

## 2023-09-28 PROCEDURE — 83605 ASSAY OF LACTIC ACID: CPT

## 2023-09-28 PROCEDURE — 36600 WITHDRAWAL OF ARTERIAL BLOOD: CPT

## 2023-09-28 PROCEDURE — 84439 ASSAY OF FREE THYROXINE: CPT

## 2023-09-28 PROCEDURE — 6370000000 HC RX 637 (ALT 250 FOR IP)

## 2023-09-28 PROCEDURE — 2580000003 HC RX 258: Performed by: STUDENT IN AN ORGANIZED HEALTH CARE EDUCATION/TRAINING PROGRAM

## 2023-09-28 PROCEDURE — 85025 COMPLETE CBC W/AUTO DIFF WBC: CPT

## 2023-09-28 PROCEDURE — 6370000000 HC RX 637 (ALT 250 FOR IP): Performed by: STUDENT IN AN ORGANIZED HEALTH CARE EDUCATION/TRAINING PROGRAM

## 2023-09-28 RX ORDER — LEVETIRACETAM 500 MG/5ML
1000 INJECTION, SOLUTION, CONCENTRATE INTRAVENOUS EVERY 12 HOURS
Status: DISCONTINUED | OUTPATIENT
Start: 2023-09-28 | End: 2023-09-28

## 2023-09-28 RX ORDER — LABETALOL HYDROCHLORIDE 5 MG/ML
10 INJECTION, SOLUTION INTRAVENOUS EVERY 4 HOURS PRN
Status: DISCONTINUED | OUTPATIENT
Start: 2023-09-28 | End: 2023-10-04 | Stop reason: HOSPADM

## 2023-09-28 RX ORDER — HALOPERIDOL 5 MG/ML
5 INJECTION INTRAMUSCULAR ONCE
Status: COMPLETED | OUTPATIENT
Start: 2023-09-28 | End: 2023-09-28

## 2023-09-28 RX ORDER — LORAZEPAM 0.5 MG/1
0.5 TABLET ORAL ONCE
Status: COMPLETED | OUTPATIENT
Start: 2023-09-28 | End: 2023-09-28

## 2023-09-28 RX ORDER — HALOPERIDOL 5 MG/ML
INJECTION INTRAMUSCULAR
Status: COMPLETED
Start: 2023-09-28 | End: 2023-09-28

## 2023-09-28 RX ORDER — ATENOLOL 25 MG/1
25 TABLET ORAL 2 TIMES DAILY
Status: DISCONTINUED | OUTPATIENT
Start: 2023-09-28 | End: 2023-09-30

## 2023-09-28 RX ORDER — HYDRALAZINE HYDROCHLORIDE 20 MG/ML
10 INJECTION INTRAMUSCULAR; INTRAVENOUS EVERY 4 HOURS PRN
Status: DISCONTINUED | OUTPATIENT
Start: 2023-09-28 | End: 2023-10-04 | Stop reason: HOSPADM

## 2023-09-28 RX ORDER — LORAZEPAM 0.5 MG/1
0.5 TABLET ORAL EVERY 12 HOURS
Status: DISCONTINUED | OUTPATIENT
Start: 2023-09-28 | End: 2023-10-02

## 2023-09-28 RX ORDER — PANTOPRAZOLE SODIUM 40 MG/10ML
40 INJECTION, POWDER, LYOPHILIZED, FOR SOLUTION INTRAVENOUS DAILY
Status: DISCONTINUED | OUTPATIENT
Start: 2023-09-28 | End: 2023-09-28 | Stop reason: CLARIF

## 2023-09-28 RX ORDER — DEXMEDETOMIDINE HYDROCHLORIDE 4 UG/ML
.1-1.5 INJECTION, SOLUTION INTRAVENOUS CONTINUOUS
Status: DISCONTINUED | OUTPATIENT
Start: 2023-09-28 | End: 2023-09-28

## 2023-09-28 RX ORDER — EZETIMIBE 10 MG/1
10 TABLET ORAL DAILY
Status: DISCONTINUED | OUTPATIENT
Start: 2023-09-28 | End: 2023-10-04 | Stop reason: HOSPADM

## 2023-09-28 RX ORDER — LEVETIRACETAM 500 MG/5ML
500 INJECTION, SOLUTION, CONCENTRATE INTRAVENOUS EVERY 12 HOURS
Status: DISCONTINUED | OUTPATIENT
Start: 2023-09-28 | End: 2023-09-28

## 2023-09-28 RX ADMIN — ACETAMINOPHEN 650 MG: 325 TABLET ORAL at 17:11

## 2023-09-28 RX ADMIN — LORAZEPAM 0.5 MG: 0.5 TABLET ORAL at 21:02

## 2023-09-28 RX ADMIN — SODIUM CHLORIDE, PRESERVATIVE FREE 10 ML: 5 INJECTION INTRAVENOUS at 08:32

## 2023-09-28 RX ADMIN — ENOXAPARIN SODIUM 40 MG: 100 INJECTION SUBCUTANEOUS at 08:30

## 2023-09-28 RX ADMIN — SODIUM CHLORIDE, PRESERVATIVE FREE 10 ML: 5 INJECTION INTRAVENOUS at 21:09

## 2023-09-28 RX ADMIN — LORAZEPAM 0.5 MG: 0.5 TABLET ORAL at 12:47

## 2023-09-28 RX ADMIN — LABETALOL HYDROCHLORIDE 10 MG: 5 INJECTION, SOLUTION INTRAVENOUS at 17:24

## 2023-09-28 RX ADMIN — HALOPERIDOL LACTATE 5 MG: 5 INJECTION, SOLUTION INTRAMUSCULAR at 01:04

## 2023-09-28 RX ADMIN — LEVETIRACETAM 1000 MG: 500 INJECTION INTRAVENOUS at 12:30

## 2023-09-28 RX ADMIN — Medication 1 TABLET: at 08:28

## 2023-09-28 RX ADMIN — HALOPERIDOL 5 MG: 5 INJECTION INTRAMUSCULAR at 01:04

## 2023-09-28 RX ADMIN — SERTRALINE HYDROCHLORIDE 25 MG: 50 TABLET ORAL at 08:28

## 2023-09-28 RX ADMIN — ATENOLOL 25 MG: 25 TABLET ORAL at 21:02

## 2023-09-28 RX ADMIN — EZETIMIBE 10 MG: 10 TABLET ORAL at 08:29

## 2023-09-28 RX ADMIN — Medication 1 TABLET: at 21:02

## 2023-09-28 RX ADMIN — CEFTRIAXONE SODIUM 2000 MG: 2 INJECTION, POWDER, FOR SOLUTION INTRAMUSCULAR; INTRAVENOUS at 03:07

## 2023-09-28 RX ADMIN — SODIUM CHLORIDE, PRESERVATIVE FREE 40 MG: 5 INJECTION INTRAVENOUS at 08:29

## 2023-09-28 RX ADMIN — ACETAMINOPHEN 650 MG: 325 TABLET ORAL at 08:28

## 2023-09-28 RX ADMIN — ATENOLOL 25 MG: 25 TABLET ORAL at 08:39

## 2023-09-28 RX ADMIN — DEXMEDETOMIDINE HYDROCHLORIDE 0.2 MCG/KG/HR: 4 INJECTION, SOLUTION INTRAVENOUS at 00:36

## 2023-09-28 ASSESSMENT — PULMONARY FUNCTION TESTS: PIF_VALUE: 13

## 2023-09-28 NOTE — PROCEDURES
CONTINUOUS ELECTROENCEPHALOGRAM (cEEG) REPORT    Identifying Information:  Name: Job Lino  MRN: [id number]  : 1940  Interpreting Physician: Jamie Pichardo DO  Referring Provider: Jamie Pichardo DO      Clinical History:  Job Lino is an 80 y.o. female pmh of vertigo, HTN, COPD, depression, GERD, HLD, OA, anxiety, cerebral palsy who presents with AMS after a fall. Ultimately required intubation for airway protection. There is concern for seizures. Past Medical History:  Past Medical History:   Diagnosis Date    Anxiety     Cardiomegaly     Cerebral palsy ([address])     Chronic bronchitis (HCC)     Compression fracture 2013    age of onset unknown    COPD (chronic obstructive pulmonary disease) ([address])     Depression     Environmental allergies     Essential hypertension, benign     white coat hypertension    Family history of tremor     GERD (gastroesophageal reflux disease)     Hyperlipemia     dx'd     Impaired glucose tolerance     Impaired glucose tolerance     Lichen sclerosus     Mild mitral regurgitation     Mild tricuspid regurgitation     per 2014 echo    Moderate aortic insufficiency     Osteoarthritis of lumbar spine     Osteoarthritis of thoracic spine     Osteoporosis     Sleep apnea     Vertigo         Current Medications:    oyster shell calcium w/D  1 tablet Oral BID    ezetimibe  10 mg Oral Daily    sertraline  25 mg Oral Daily    atenolol  25 mg Oral BID    pantoprazole (PROTONIX) 40 mg in sodium chloride (PF) 0.9 % 10 mL injection  40 mg IntraVENous Daily    sodium chloride flush  5-40 mL IntraVENous BID    enoxaparin  40 mg SubCUTAneous Daily    cefTRIAXone (ROCEPHIN) IV  2,000 mg IntraVENous Q24H        cEEG start date & time: 23 8299-8217 and restarted 0245  cEEG end date & time: 23 0544     Indication:  cEEG was initiated for monitoring and localization of seizures as the etiology of the encephalopathy/altered mental status.  It was available for review

## 2023-09-28 NOTE — PROGRESS NOTES
Routine Inpatient EEG completed 9/28/2023. EEG is now awaiting interpretation. Physician has been notified via [employer].

## 2023-09-28 NOTE — CARE COORDINATION
Chart reviewed. Cm in to see pt to initiate discharge planning. No family present at this time. Pt fell at home and was admitted with altered mental status. Pt with ? ??seizure. Pt is oriented to name, place but is also trying to get out of bed although told repeatedly that she cannot get out of bed. Pt states that she lives on [address] with her . Pt states that she has a dgt in [place] and a son in [facility]. Pt has PCP and insurance to assist with medical costs. CM anticipates a need for possible PT/OT evals when medically appropriate to help determine safe and appropriate plan for discharge. Cm will also reach out to  to discuss discharge planning/needs further. 09/28/23 1222   Service Assessment   Patient Orientation Alert and Oriented;Person;Place;Situation   Cognition Alert   History Provided By Patient;Medical Record   Primary Caregiver Spouse   Accompanied By/Relationship N/A   Support Systems Spouse/Significant Other   Patient's Healthcare Decision Maker is: Legal Next of Kin   PCP Verified by CM Yes   Last Visit to PCP   (unknown)   Prior Functional Level Other (see comment)  (uncertain at this time but appears as if pt required assistance is the home setting with ADLs)   Current Functional Level Assistance with the following:;Bathing; Toileting;Mobility   Can patient return to prior living arrangement Unknown at present   Ability to make needs known: Fair   Family able to assist with home care needs: Yes   Would you like for me to discuss the discharge plan with any other family members/significant others, and if so, who?  Yes  (emergency contact/family as needed)   Financial Resources [employer] None   CM/SW Referral Other (see comment)  (discharge planning assessment.)

## 2023-09-28 NOTE — PROGRESS NOTES
[facility] STAT EEG      [facility] placed on pt:   Date: 9/28/23  Time: 0245    Level of consciousness: Alert    Ventilated: Yes    Sedated: No    Initial Seizure [place] (%): 0    Ordering Provider:  Ish Degroot NP     Pertinent Events : Seizures        Study Discontinued:   Time: 36        On-call Neurologist notified of the completed exam.        Electronically signed by Kanu Mendoza RN on 9/28/2023 at 6151

## 2023-09-28 NOTE — PROGRESS NOTES
Pt extubated @ 6412 per Ann Mcintyre NP by Jaz WHITE. Pt following all commands and remaining calm. Ann Mcintyre NP at bedside at time of extubation. O2 sat 95 on RA. Pt resting comfortably at this time.

## 2023-09-28 NOTE — H&P
V2.0  History and Physical      Name:  Carmen Dejesus /Age/Sex: 1940  (80 y.o. female)   MRN & CSN:  [id number] & [id number] Encounter Date/Time: 2023 1:35 AM EDT   Location:  -A PCP: Yamil Moore DO       Hospital Day: 2    Assessment and Plan:   Carmen Dejesus is a 80 y.o. female with a pmh of vertigo, HTN, COPD, depression, GERD, HLD, OA, anxiety, cerebral palsy who presents with Altered mental status    Hospital Problems             Last Modified POA    * (Principal) Altered mental status 2023 Yes         Acute Encephalopathy, unclear etiology  Could be 2/2 to versed she received en route to hospital OR possible seizure causing the fall and was post ictal until EMS transfer?   Required intubation for airway protection  CT head: L frontal cephalhematoma, no hemorrhage  CTA head and neck: No flow-limiting large vessel stenosis in the head and neck  UA (-)    Headache  Fall with head trauma  CT head: L frontal cephalhematoma, no hemorrhage  CT cervical spine (-)  Patient has daily headaches per , but HA today was worse than normal    Acute Respiratory Failure with hypoxia  Intubated for airway protection  CXR: hazy opacities throughout both lungs could be edema with superimposed infection  Was prescribed course of azithromycin outpatient  Will add cetriaxone for now    Pulmonary edema  Seen on CT a head and neck and CT cervical spine      Lactic Acidosis  R/o seizure, no prior history of seizures  Lactate 7.9   WBC 11, Tmax 99.7  On ceribell  Follow blood cultures    Essential hypertension  Continue atenolol    2.2 cm L Thyroid Nodule  Seen on CTA head and neck  Will need outpatient follow up     Hyperlipidemia  Continue Zetia    Depression  Anxiety  Continue Zoloft    Osteoporosis  Continue vitamin D  Takes fosamax weekly    Diet: NPO    Prophylaxis  SCDs, lovenox, pepcid    CODE STATUS: Full code    Plan:  Admit to ICU  Repeat CTH at 0200  CBC, CCP in AM  Trend 07:46 PM    BLOODU MODERATE NUMBER OR AMOUNT OBSERVED 09/27/2023 07:46 PM    [address] 09/27/2023 07:46 PM     Urine Cultures: No results found for: \"LABURIN\"  Blood Cultures: No results found for: \"BC\"  No results found for: \"BLOODCULT2\"  Organism: No results found for: \"ORG\"    Imaging/Diagnostics Last 24 Hours   CTA HEAD NECK W CONTRAST    Result Date: 9/27/2023  EXAMINATION: CTA OF THE HEAD AND NECK WITH CONTRAST 9/27/2023 9:22 pm: TECHNIQUE: CTA of the head and neck was performed with the administration of intravenous contrast. Multiplanar reformatted images are provided for review. MIP images are provided for review. Stenosis of the internal carotid arteries measured using NASCET criteria. Automated exposure control, iterative reconstruction, and/or weight based adjustment of the mA/kV was utilized to reduce the radiation dose to as low as reasonably achievable. COMPARISON: None. HISTORY: Reason for Exam: syncope/acute AMS FINDINGS: CTA NECK: Motion artifact is present. AORTIC ARCH/ARCH VESSELS: No dissection or arterial injury. No significant stenosis of the brachiocephalic or subclavian arteries. CAROTID ARTERIES: No dissection, arterial injury, or hemodynamically significant stenosis by NASCET criteria. VERTEBRAL ARTERIES: No dissection, arterial injury, or significant stenosis. SOFT TISSUES: Changes of edema in the visualized lungs. No cervical or superior mediastinal lymphadenopathy. The larynx and pharynx are unremarkable. There is a 2.2 cm left thyroid nodule. BONES: No acute osseous abnormality. CTA HEAD: Motion artifact is present. ANTERIOR CIRCULATION: No significant stenosis of the intracranial internal carotid, anterior cerebral, or middle cerebral arteries. No aneurysm. POSTERIOR CIRCULATION: No significant stenosis of the vertebral, basilar, or posterior cerebral arteries. No aneurysm. OTHER: The dural venous sinuses are not well visualized. BRAIN: No mass effect or midline shift.  No ams TECHNOLOGIST PROVIDED HISTORY: Has a \"code stroke\" or \"stroke alert\" been called? ->No Reason for exam:->ams Decision Support Exception - unselect if not a suspected or confirmed emergency medical condition->Emergency Medical Condition (MA) Reason for Exam: ams; ORDERING SYSTEM PROVIDED HISTORY: trauma TECHNOLOGIST PROVIDED HISTORY: Reason for exam:->trauma Decision Support Exception - unselect if not a suspected or confirmed emergency medical condition->Emergency Medical Condition (MA) Reason for Exam: trauma FINDINGS: CT HEAD: BRAIN/VENTRICLES: There is no acute intracranial hemorrhage, mass effect or midline shift. No abnormal extra-axial fluid collection. The gray-white differentiation is maintained without evidence of an acute infarct. There is no evidence of hydrocephalus. There is mild age appropriate global cerebral atrophy. ORBITS: The visualized portion of the orbits demonstrate no acute abnormality. SINUSES: The visualized paranasal sinuses and mastoid air cells demonstrate no acute abnormality. SOFT TISSUES/SKULL: No acute abnormality of the visualized skull. Small soft tissue contusion/cephalhematoma of the left frontal scalp. CT CERVICAL SPINE: BONES/ALIGNMENT: There is no acute fracture or traumatic malalignment. DEGENERATIVE CHANGES: Multilevel mild spondylosis and mild-to-moderate facet arthropathy of the cervical spine. Solid osseous fusion of the posterior elements at C2-C3. Partial osseous fusion of the C2-C3 vertebral bodies. SOFT TISSUES: There is no prevertebral soft tissue swelling. Partially imaged lung apices demonstrate interlobular septal thickening and ground-glass opacity favored to reflect pulmonary edema. Endotracheal tube in place. CT head: 1. No acute intracranial process identified. 2. Left frontal cephalhematoma. CT cervical spine: 1. No acute fracture of the cervical spine identified. 2. Findings suggesting pulmonary edema at the partially imaged lung apices.

## 2023-09-28 NOTE — PROGRESS NOTES
Gar Hick was placed on patient twice. First placement 9/28/23 12:51 am and ending at 1:55 am. Second placement 9/28/2023 2:45 am and ending at 5:44 am. Interpreting physician was notified via perfect serve.

## 2023-09-28 NOTE — CONSULTS
Neurology Service Consult Note  [address] [place] [address]   Patient Name: Julian Buck  : 1940        Subjective:   Reason for consult: Seizure  80 y.o. female with history of anxiety, vertigo, HTN, COPD, depression, HLD, OA, CP presenting to [address] [place] [address] with altered mental status after fall. Per chart review patient was reporting headache earlier in the day and then had vomiting that evening. Patient does report daily headaches and vertigo. Chart was reviewed in detail, patient was seen and assessed. She is drowsy today, alert and oriented to self and location. Daughter and  are at bedside who provide bulk of history. He states the patient been complaining of headache yesterday and had loss of consciousness while vomiting. She does not have any evidence for tongue bite on exam.  Patient's daughter shares that the patient has near daily headache. She does describe migraine type headaches as well as sinus issues that may also provoke headache. Patient initially states that her headache was worse yesterday than it has been in the past but then that it is about the same. Family shares that the patient has an increase in family stressors over the last 6 months. Patient's daughter shares that she \"has had a lot of bad days\". Patient does intermittently participate in exam today. She has had no further events concerning for seizure.       Past Medical History:   Diagnosis Date    Anxiety     Cardiomegaly     Cerebral palsy ([facility])     Chronic bronchitis ([facility])     Compression fracture 2013    age of onset unknown    COPD (chronic obstructive pulmonary disease) ([facility])     Depression     Environmental allergies     Essential hypertension, benign 1985    white coat hypertension    Family history of tremor     GERD (gastroesophageal reflux disease)     Hyperlipemia     dx'd     Impaired glucose tolerance     Impaired glucose tolerance     Lichen sclerosus     Mild

## 2023-09-28 NOTE — CARE COORDINATION
[facility] criteria for Neurology reviewed at this time, criteria supports Inpatient Admission.  JESUS,RN/CM

## 2023-09-28 NOTE — PROGRESS NOTES
Pt arrived to unit at [address] [place] [address] via ED RN and RT. VSS at this time, pt on propofol and fentanyl gtt. Pt moved over to ICU bed and skin assessment completed at this time. Pt has scattered bruising and a L forehead hematoma from fall. Perlita Han NP at bedside ordered for Fentanyl and Propofol to be stopped and switch to Precedex for possible extubation. Pt is following commands and is calm at this time.

## 2023-09-28 NOTE — PROGRESS NOTES
V2.0  [facility] Hospitalist Progress Note      Name:  Shrayn Perla /Age/Sex: 1940  (80 y.o. female)   MRN & CSN:  [id number] & [id number] Encounter Date/Time: 2023 12:34 PM EDT    Location:  -A PCP: Carlos Manuel Fan DO       Hospital Day: 2    Assessment and Plan:   Sharyn Perla is a 80 y.o. female with pmh of   vertigo, HTN, COPD, depression, GERD, HLD, OA, anxiety, cerebral palsy  who presents with Altered mental status      Plan:  Acute Encephalopathy, unclear etiology: Could be 2/2 to  seizure disorder causing the fall and was post ictal until EMS transfer, also had severe headache prior to the fall, Required intubation for airway protection, CT head: L frontal cephalhematoma, no hemorrhage, CTA head and neck: No flow-limiting large vessel stenosis in the head and neck EEG without any evidence of seizure, UA (-), plan for MRI brain later today   Acute Respiratory Failure with hypoxia: Intubated for airway protection, extubated this morning, CXR: hazy opacities throughout both lungs could be edema with superimposed infection, Abx discontinued as no evidence of infection     Essential hypertension: Continue atenolol      Hyperlipidemia: Continue Zetia     Anxiety: Continue Zoloft   Osteoporosis: Continue vitamin D, Takes fosamax weekly    Diet ADULT DIET; Regular   DVT Prophylaxis [x] Lovenox, []  Heparin, [] SCDs, [] Ambulation,  [] Eliquis, [] Xarelto  [] Coumadin   Code Status Full Code   Disposition From: Home  Expected Disposition: TBD  Estimated Date of Discharge: 3 days  Patient requires continued admission due to encephalopathy, suspected seizure   Surrogate Decision Maker/ POA      Subjective:     Chief Complaint: unresponsive, Fall, and Head Injury (Hematoma L forehead )       Sharyn Perla is a 80 y.o. female who presents with severe headache and fall.   Seen and examined at bedside extubated earlier this morning,  present at bedside as well, states that she is feeling Automated exposure control, iterative reconstruction, and/or weight based adjustment of the mA/kV was utilized to reduce the radiation dose to as low as reasonably achievable.; CT of the cervical spine was performed without the administration of intravenous contrast. Multiplanar reformatted images are provided for review. Automated exposure control, iterative reconstruction, and/or weight based adjustment of the mA/kV was utilized to reduce the radiation dose to as low as reasonably achievable. COMPARISON: None. HISTORY: ORDERING SYSTEM PROVIDED HISTORY: ams TECHNOLOGIST PROVIDED HISTORY: Has a \"code stroke\" or \"stroke alert\" been called? ->No Reason for exam:->ams Decision Support Exception - unselect if not a suspected or confirmed emergency medical condition->Emergency Medical Condition (MA) Reason for Exam: ams; ORDERING SYSTEM PROVIDED HISTORY: trauma TECHNOLOGIST PROVIDED HISTORY: Reason for exam:->trauma Decision Support Exception - unselect if not a suspected or confirmed emergency medical condition->Emergency Medical Condition (MA) Reason for Exam: trauma FINDINGS: CT HEAD: BRAIN/VENTRICLES: There is no acute intracranial hemorrhage, mass effect or midline shift. No abnormal extra-axial fluid collection. The gray-white differentiation is maintained without evidence of an acute infarct. There is no evidence of hydrocephalus. There is mild age appropriate global cerebral atrophy. ORBITS: The visualized portion of the orbits demonstrate no acute abnormality. SINUSES: The visualized paranasal sinuses and mastoid air cells demonstrate no acute abnormality. SOFT TISSUES/SKULL: No acute abnormality of the visualized skull. Small soft tissue contusion/cephalhematoma of the left frontal scalp. CT CERVICAL SPINE: BONES/ALIGNMENT: There is no acute fracture or traumatic malalignment. DEGENERATIVE CHANGES: Multilevel mild spondylosis and mild-to-moderate facet arthropathy of the cervical spine.   Solid osseous fusion

## 2023-09-28 NOTE — ED PROVIDER NOTES
Emergency Department Encounter  Location: [facility]    Patient: Ty Lopez  MRN: [id number]  : 1940  Date of evaluation: 2023  ED Provider: Giovani Weston DO    Chief Complaint:    unresponsive, Fall, and Head Injury (Hematoma L forehead )    [place]:  Ty Lopez is a 80 y.o. female that presents to the emergency department with concern for unresponsiveness. Per EMS, patient began complaining of a headache and was vomiting at home this evening. Her  reported that he heard her fall. When he found her in the bathroom, she had apparently hit her head and was unresponsive. EMS reports she was initially unresponsive but became combative and was confused in the EMS unit in route.   Was given 2.5 mg of midazolam prior to arrival.      Past Medical History:   Diagnosis Date    Anxiety     Cardiomegaly     Cerebral palsy ([address])     Chronic bronchitis ([address])     Compression fracture 2013    age of onset unknown    COPD (chronic obstructive pulmonary disease) (HCC)     Depression     Environmental allergies     Essential hypertension, benign     white coat hypertension    Family history of tremor     GERD (gastroesophageal reflux disease)     Hyperlipemia     dx'd     Impaired glucose tolerance     Impaired glucose tolerance     Lichen sclerosus     Mild mitral regurgitation     Mild tricuspid regurgitation     per 2014 echo    Moderate aortic insufficiency     Osteoarthritis of lumbar spine     Osteoarthritis of thoracic spine     Osteoporosis     Sleep apnea     Vertigo      Past Surgical History:   Procedure Laterality Date    CERVIX BIOPSY  2011    cone biopsy    CHOLECYSTECTOMY      COLONOSCOPY  2012    DILATION AND CURETTAGE OF UTERUS       Family History   Problem Relation Age of Onset    Cancer Brother         colon cancer    Other Brother         meningitis    Heart Disease Mother     Heart Disease Father     Heart Attack Father     Diabetes Sister         type 2    Macular IntraVENous Q4H PRN Corrina Smita Jersey, APRN - CNP   10 mg at 09/28/23 1724    hydrALAZINE (APRESOLINE) injection 10 mg  10 mg IntraVENous Q4H PRN Corrina Ordoñez Jersey, APRN - CNP   10 mg at 10/01/23 2053    sodium chloride flush 0.9 % injection 5-40 mL  5-40 mL IntraVENous BID Fernanda Escobar MD   10 mL at 10/01/23 2047    sodium chloride flush 0.9 % injection 5-40 mL  5-40 mL IntraVENous PRN Fernanda Escobar MD        0.9 % sodium chloride infusion   IntraVENous PRN Fernanda Escobar MD        enoxaparin (LOVENOX) injection 40 mg  40 mg SubCUTAneous Daily Fernanda Escobar MD   40 mg at 10/01/23 0844    ondansetron (ZOFRAN-ODT) disintegrating tablet 4 mg  4 mg Oral Q8H PRN Fernanda Escobar MD        Or    ondansetron [facility]) injection 4 mg  4 mg IntraVENous Q6H PRN Fernanda Escobar MD        polyethylene glycol (GLYCOLAX) packet 17 g  17 g Oral Daily PRN Fernanda Escobar MD        acetaminophen (TYLENOL) tablet 650 mg  650 mg Oral Q6H PRN Fernanda Escobar MD   650 mg at 10/01/23 0845    Or    acetaminophen (TYLENOL) suppository 650 mg  650 mg Rectal Q6H PRN Fernanda Escobar MD         Allergies   Allergen Reactions    Inderal [Propranolol Hcl]     Statins      Muscle aches    Tramadol Other (See Comments)     headache       Nursing Notes Reviewed    Physical Exam:  ED Triage Vitals [09/27/23 1852]   Enc Vitals Group      BP (!) 149/105      Pulse 85      Respirations 17      Temp       Temp src       SpO2 99 %      Weight - Scale 170 lb (77.1 kg)      Height 5' 3\" (1.6 m)      Head Circumference       Peak Flow       Pain Score       Pain Loc       Pain Edu? Excl. in [address]? GENERAL APPEARANCE: Obtunded on arrival.    HEAD: Normocephalic. Contusion on left forehead  EYES: Pupils equal and responsive. Sclera anicteric. ENT: Tolerates saliva. No trismus. NECK: Placed in c collar. Trachea midline. CARDIO: RRR. Radial pulse 2+. LUNGS: Respirations unlabored. CTAB. Sats in mid to high 90's on NRB. ABDOMEN: Soft. Non-distended. DEGENERATIVE CHANGES: Multilevel mild spondylosis and mild-to-moderate facet arthropathy of the cervical spine. Solid osseous fusion of the posterior elements at C2-C3. Partial osseous fusion of the C2-C3 vertebral bodies. SOFT TISSUES: There is no prevertebral soft tissue swelling. Partially imaged lung apices demonstrate interlobular septal thickening and ground-glass opacity favored to reflect pulmonary edema. Endotracheal tube in place. CT head: 1. No acute intracranial process identified. 2. Left frontal cephalhematoma. CT cervical spine: 1. No acute fracture of the cervical spine identified. 2. Findings suggesting pulmonary edema at the partially imaged lung apices. XR CHEST PORTABLE    Result Date: 9/27/2023  EXAMINATION: ONE XRAY VIEW OF THE CHEST 9/27/2023 7:15 pm COMPARISON: 07/10/2017 HISTORY: ORDERING SYSTEM PROVIDED HISTORY: intubation TECHNOLOGIST PROVIDED HISTORY: Reason for exam:->intubation Reason for Exam: intubation Additional signs and symptoms: na Relevant Medical/Surgical History: COPD FINDINGS: Endotracheal tube tip 3 cm above the annie. Hazy opacities throughout both lungs. Mild cardiomegaly with vascular congestion and Kerley B lines. No pleural effusion or pneumothorax. No acute osseous abnormality. Mild cardiomegaly and interstitial edema. Hazy opacities throughout both lungs could represent alveolar edema with superimposed infection not excluded. CC/HPI Summary, DDx, ED Course, and Reassessment: On arrival, patient's level of consciousness is significantly decreased. She did receive small dose of Versed prior to arrival but medics and family also report she was completely unresponsive prior to this and had been complaining of a severe headache. Unclear etiology of the sudden change. Due to strong suspicion for ICH and need to expedite evaluation, decision was made to intubate the patient for airway protection and send directly for imaging.     Procedure Note -

## 2023-09-28 NOTE — PROGRESS NOTES
ventricular hypertrophy with QRS widening and repolarization abnormality  Cannot rule out Septal infarct , age undetermined  No previous ECGs available     Troponin    Collection Time: 09/27/23  6:55 PM   Result Value Ref Range    Troponin T <0.010 <0.01 NG/ML   Magnesium    Collection Time: 09/27/23  6:55 PM   Result Value Ref Range    Magnesium 2.1 1.8 - 2.4 mg/dl   Comprehensive Metabolic Panel    Collection Time: 09/27/23  6:55 PM   Result Value Ref Range    Sodium 132 (L) 135 - 145 MMOL/L    Potassium 3.6 3.5 - 5.1 MMOL/L    Chloride 93 (L) 99 - 110 mMol/L    CO2 21 21 - 32 MMOL/L    Anion Gap 18 (H) 4 - 16    Glucose 182 (H) 70 - 99 MG/DL    BUN 13 6 - 23 MG/DL    Creatinine 1.1 0.6 - 1.1 MG/DL    Est, Glom Filt Rate 50 (L) >60 mL/min/1.73m2    Calcium 9.2 8.3 - 10.6 MG/DL    Total Protein 7.5 6.4 - 8.2 GM/DL    Albumin 4.3 3.4 - 5.0 GM/DL    Total Bilirubin 0.3 0.0 - 1.0 MG/DL    Alkaline Phosphatase 69 40 - 129 IU/L    ALT 18 10 - 40 U/L    AST 25 15 - 37 IU/L   CBC    Collection Time: 09/27/23  6:55 PM   Result Value Ref Range    WBC 11.0 (H) 4.0 - 10.5 K/CU MM    RBC 5.23 4.2 - 5.4 M/CU MM    Hemoglobin 15.3 12.5 - 16.0 GM/DL    Hematocrit 47.0 37 - 47 %    MCV 89.9 78 - 100 FL    MCH 29.3 27 - 31 PG    MCHC 32.6 32.0 - 36.0 %    RDW 11.8 11.7 - 14.9 %    Platelets 785 565 - 999 K/CU MM    MPV 10.1 7.5 - 11.1 FL   Protime-INR    Collection Time: 09/27/23  6:55 PM   Result Value Ref Range    Protime 14.4 11.7 - 14.5 SECONDS    INR 1.1 INDEX   Procalcitonin    Collection Time: 09/27/23  6:55 PM   Result Value Ref Range    Procalcitonin 0.042    Lactate, Sepsis    Collection Time: 09/27/23  6:55 PM   Result Value Ref Range    Lactic Acid, Sepsis 7.9 (HH) 0.5 - 1.9 mMOL/L   Brain Natriuretic Peptide    Collection Time: 09/27/23  6:55 PM   Result Value Ref Range    Pro-.4 (H) <300 PG/ML   POCT Glucose    Collection Time: 09/27/23  7:41 PM   Result Value Ref Range    Glucose 175 mg/dL    QC OK?  yes - 1.9 mMOL/L   Critical Care Panel    Collection Time: 09/28/23  6:24 AM   Result Value Ref Range    Sodium 134 (L) 135 - 145 MMOL/L    Potassium 4.0 3.5 - 5.1 MMOL/L    Chloride 97 (L) 99 - 110 mMol/L    CO2 24 21 - 32 MMOL/L    Anion Gap 13 4 - 16    Glucose 153 (H) 70 - 99 MG/DL    BUN 13 6 - 23 MG/DL    Creatinine 1.1 0.6 - 1.1 MG/DL    Est, Glom Filt Rate 50 (L) >60 mL/min/1.73m2    Calcium 9.1 8.3 - 10.6 MG/DL    Phosphorus 3.2 2.5 - 4.9 MG/DL    Magnesium 2.0 1.8 - 2.4 mg/dl   CBC with Auto Differential    Collection Time: 09/28/23  6:24 AM   Result Value Ref Range    WBC 14.6 (H) 4.0 - 10.5 K/CU MM    RBC 4.96 4.2 - 5.4 M/CU MM    Hemoglobin 14.7 12.5 - 16.0 GM/DL    Hematocrit 43.7 37 - 47 %    MCV 88.1 78 - 100 FL    MCH 29.6 27 - 31 PG    MCHC 33.6 32.0 - 36.0 %    RDW 11.9 11.7 - 14.9 %    Platelets 451 560 - 184 K/CU MM    MPV 10.1 7.5 - 11.1 FL    Differential Type AUTOMATED DIFFERENTIAL     Segs Relative 84.0 (H) 36 - 66 %    Lymphocytes % 7.1 (L) 24 - 44 %    Monocytes % 8.4 (H) 0 - 4 %    Eosinophils % 0.1 0 - 3 %    Basophils % 0.1 0 - 1 %    Segs Absolute 12.2 K/CU MM    Lymphocytes Absolute 1.0 K/CU MM    Monocytes Absolute 1.2 K/CU MM    Eosinophils Absolute 0.0 K/CU MM    Basophils Absolute 0.0 K/CU MM    Nucleated RBC % 0.0 %    Total Nucleated RBC 0.0 K/CU MM    Total Immature Neutrophil 0.05 K/CU MM    Immature Neutrophil % 0.3 0 - 0.43 %   TSH    Collection Time: 09/28/23  6:24 AM   Result Value Ref Range    TSH, High Sensitivity 3.370 0.270 - 4.20 uIu/ml   T4, Free    Collection Time: 09/28/23  6:24 AM   Result Value Ref Range    T4 Free 1.26 0.9 - 1.8 NG/DL        Imaging/Diagnostics Last 24 Hours   CT HEAD WO CONTRAST    Result Date: 9/28/2023  EXAMINATION: CT OF THE HEAD WITHOUT CONTRAST  9/28/2023 1:27 am TECHNIQUE: CT of the head was performed without the administration of intravenous contrast. Automated exposure control, iterative reconstruction, and/or weight based adjustment of the Electronically signed by JULIETTE Martines CNP on 9/28/2023 at 9:39 AM

## 2023-09-29 ENCOUNTER — APPOINTMENT (OUTPATIENT)
Dept: GENERAL RADIOLOGY | Age: 83
DRG: 208 | End: 2023-09-29
Payer: MEDICARE

## 2023-09-29 LAB
ANION GAP SERPL CALCULATED.3IONS-SCNC: 13 MMOL/L (ref 4–16)
ANION GAP SERPL CALCULATED.3IONS-SCNC: 13 MMOL/L (ref 4–16)
ANION GAP SERPL CALCULATED.3IONS-SCNC: 16 MMOL/L (ref 4–16)
BASE EXCESS MIXED: 0.1 (ref 0–2.3)
BASOPHILS ABSOLUTE: 0 K/CU MM
BASOPHILS RELATIVE PERCENT: 0.3 % (ref 0–1)
BUN SERPL-MCNC: 10 MG/DL (ref 6–23)
BUN SERPL-MCNC: 13 MG/DL (ref 6–23)
BUN SERPL-MCNC: 14 MG/DL (ref 6–23)
CALCIUM SERPL-MCNC: 8.3 MG/DL (ref 8.3–10.6)
CALCIUM SERPL-MCNC: 8.8 MG/DL (ref 8.3–10.6)
CALCIUM SERPL-MCNC: 8.8 MG/DL (ref 8.3–10.6)
CHLORIDE BLD-SCNC: 90 MMOL/L (ref 99–110)
CHLORIDE BLD-SCNC: 94 MMOL/L (ref 99–110)
CHLORIDE BLD-SCNC: 94 MMOL/L (ref 99–110)
CHLORIDE URINE RANDOM: 18 MMOL/L (ref 43–210)
CO2: 20 MMOL/L (ref 21–32)
CO2: 23 MMOL/L (ref 21–32)
CO2: 24 MMOL/L (ref 21–32)
COMMENT: ABNORMAL
CORTISOL, PLASMA: 13.11
CREAT SERPL-MCNC: 0.9 MG/DL (ref 0.6–1.1)
CREAT SERPL-MCNC: 1 MG/DL (ref 0.6–1.1)
CREAT SERPL-MCNC: 1 MG/DL (ref 0.6–1.1)
CREAT UR-MCNC: 27.9 MG/DL (ref 28–217)
DIFFERENTIAL TYPE: ABNORMAL
EOSINOPHILS ABSOLUTE: 0.1 K/CU MM
EOSINOPHILS RELATIVE PERCENT: 0.7 % (ref 0–3)
GFR SERPL CREATININE-BSD FRML MDRD: 56 ML/MIN/1.73M2
GFR SERPL CREATININE-BSD FRML MDRD: 56 ML/MIN/1.73M2
GFR SERPL CREATININE-BSD FRML MDRD: >60 ML/MIN/1.73M2
GLUCOSE SERPL-MCNC: 118 MG/DL (ref 70–99)
GLUCOSE SERPL-MCNC: 149 MG/DL (ref 70–99)
GLUCOSE SERPL-MCNC: 162 MG/DL (ref 70–99)
HCO3 VENOUS: 24.7 MMOL/L (ref 19–25)
HCT VFR BLD CALC: 43.4 % (ref 37–47)
HEMOGLOBIN: 14.9 GM/DL (ref 12.5–16)
IMMATURE NEUTROPHIL %: 0.3 % (ref 0–0.43)
LACTATE: 1.5 MMOL/L (ref 0.5–1.9)
LYMPHOCYTES ABSOLUTE: 1.3 K/CU MM
LYMPHOCYTES RELATIVE PERCENT: 9 % (ref 24–44)
MAGNESIUM: 1.7 MG/DL (ref 1.8–2.4)
MAGNESIUM: 2.5 MG/DL (ref 1.8–2.4)
MAGNESIUM: 2.6 MG/DL (ref 1.8–2.4)
MCH RBC QN AUTO: 29.3 PG (ref 27–31)
MCHC RBC AUTO-ENTMCNC: 34.3 % (ref 32–36)
MCV RBC AUTO: 85.4 FL (ref 78–100)
MONOCYTES ABSOLUTE: 1.3 K/CU MM
MONOCYTES RELATIVE PERCENT: 8.9 % (ref 0–4)
NUCLEATED RBC %: 0 %
O2 SAT, VEN: 88.2 % (ref 50–70)
OSMOLALITY UR: 105 MOS/L (ref 292–1090)
OSMOLALITY UR: 266 MOS/L (ref 280–300)
PCO2, VEN: 39 MMHG (ref 38–52)
PDW BLD-RTO: 12.2 % (ref 11.7–14.9)
PH VENOUS: 7.41 (ref 7.32–7.42)
PHOSPHORUS: 2.2 MG/DL (ref 2.5–4.9)
PHOSPHORUS: 2.3 MG/DL (ref 2.5–4.9)
PHOSPHORUS: 3.1 MG/DL (ref 2.5–4.9)
PLATELET # BLD: 249 K/CU MM (ref 140–440)
PMV BLD AUTO: 10.4 FL (ref 7.5–11.1)
PO2, VEN: 56 MMHG (ref 28–48)
POTASSIUM SERPL-SCNC: 3.6 MMOL/L (ref 3.5–5.1)
POTASSIUM SERPL-SCNC: 3.6 MMOL/L (ref 3.5–5.1)
POTASSIUM SERPL-SCNC: 3.8 MMOL/L (ref 3.5–5.1)
POTASSIUM, UR: 12 MMOL/L (ref 22–119)
RBC # BLD: 5.08 M/CU MM (ref 4.2–5.4)
REASON FOR REJECTION: NORMAL
REJECTED TEST: NORMAL
SEGMENTED NEUTROPHILS ABSOLUTE COUNT: 12 K/CU MM
SEGMENTED NEUTROPHILS RELATIVE PERCENT: 80.8 % (ref 36–66)
SODIUM BLD-SCNC: 126 MMOL/L (ref 135–145)
SODIUM BLD-SCNC: 130 MMOL/L (ref 135–145)
SODIUM BLD-SCNC: 131 MMOL/L (ref 135–145)
SODIUM URINE: 13 MMOL/L (ref 35–167)
TOTAL IMMATURE NEUTOROPHIL: 0.05 K/CU MM
TOTAL NUCLEATED RBC: 0 K/CU MM
WBC # BLD: 14.9 K/CU MM (ref 4–10.5)

## 2023-09-29 PROCEDURE — 2000000000 HC ICU R&B

## 2023-09-29 PROCEDURE — 83930 ASSAY OF BLOOD OSMOLALITY: CPT

## 2023-09-29 PROCEDURE — 92610 EVALUATE SWALLOWING FUNCTION: CPT

## 2023-09-29 PROCEDURE — 82805 BLOOD GASES W/O2 SATURATION: CPT

## 2023-09-29 PROCEDURE — 82533 TOTAL CORTISOL: CPT

## 2023-09-29 PROCEDURE — 99232 SBSQ HOSP IP/OBS MODERATE 35: CPT | Performed by: NURSE PRACTITIONER

## 2023-09-29 PROCEDURE — 93306 TTE W/DOPPLER COMPLETE: CPT

## 2023-09-29 PROCEDURE — 2580000003 HC RX 258: Performed by: NURSE PRACTITIONER

## 2023-09-29 PROCEDURE — 84133 ASSAY OF URINE POTASSIUM: CPT

## 2023-09-29 PROCEDURE — 82570 ASSAY OF URINE CREATININE: CPT

## 2023-09-29 PROCEDURE — 82436 ASSAY OF URINE CHLORIDE: CPT

## 2023-09-29 PROCEDURE — 2500000003 HC RX 250 WO HCPCS

## 2023-09-29 PROCEDURE — 94761 N-INVAS EAR/PLS OXIMETRY MLT: CPT

## 2023-09-29 PROCEDURE — 94640 AIRWAY INHALATION TREATMENT: CPT

## 2023-09-29 PROCEDURE — 6370000000 HC RX 637 (ALT 250 FOR IP)

## 2023-09-29 PROCEDURE — 2580000003 HC RX 258: Performed by: STUDENT IN AN ORGANIZED HEALTH CARE EDUCATION/TRAINING PROGRAM

## 2023-09-29 PROCEDURE — 83605 ASSAY OF LACTIC ACID: CPT

## 2023-09-29 PROCEDURE — 2500000003 HC RX 250 WO HCPCS: Performed by: NURSE PRACTITIONER

## 2023-09-29 PROCEDURE — 83935 ASSAY OF URINE OSMOLALITY: CPT

## 2023-09-29 PROCEDURE — 6360000002 HC RX W HCPCS

## 2023-09-29 PROCEDURE — 83735 ASSAY OF MAGNESIUM: CPT

## 2023-09-29 PROCEDURE — 84300 ASSAY OF URINE SODIUM: CPT

## 2023-09-29 PROCEDURE — 6360000002 HC RX W HCPCS: Performed by: STUDENT IN AN ORGANIZED HEALTH CARE EDUCATION/TRAINING PROGRAM

## 2023-09-29 PROCEDURE — P9047 ALBUMIN (HUMAN), 25%, 50ML: HCPCS

## 2023-09-29 PROCEDURE — 2580000003 HC RX 258

## 2023-09-29 PROCEDURE — 85025 COMPLETE CBC W/AUTO DIFF WBC: CPT

## 2023-09-29 PROCEDURE — 2700000000 HC OXYGEN THERAPY PER DAY

## 2023-09-29 PROCEDURE — 94660 CPAP INITIATION&MGMT: CPT

## 2023-09-29 PROCEDURE — 84100 ASSAY OF PHOSPHORUS: CPT

## 2023-09-29 PROCEDURE — 6370000000 HC RX 637 (ALT 250 FOR IP): Performed by: SPECIALIST

## 2023-09-29 PROCEDURE — C9113 INJ PANTOPRAZOLE SODIUM, VIA: HCPCS

## 2023-09-29 PROCEDURE — 97530 THERAPEUTIC ACTIVITIES: CPT

## 2023-09-29 PROCEDURE — 71045 X-RAY EXAM CHEST 1 VIEW: CPT

## 2023-09-29 PROCEDURE — 97166 OT EVAL MOD COMPLEX 45 MIN: CPT

## 2023-09-29 PROCEDURE — 6370000000 HC RX 637 (ALT 250 FOR IP): Performed by: STUDENT IN AN ORGANIZED HEALTH CARE EDUCATION/TRAINING PROGRAM

## 2023-09-29 PROCEDURE — 95816 EEG AWAKE AND DROWSY: CPT | Performed by: STUDENT IN AN ORGANIZED HEALTH CARE EDUCATION/TRAINING PROGRAM

## 2023-09-29 PROCEDURE — A4216 STERILE WATER/SALINE, 10 ML: HCPCS

## 2023-09-29 PROCEDURE — 80048 BASIC METABOLIC PNL TOTAL CA: CPT

## 2023-09-29 PROCEDURE — 87040 BLOOD CULTURE FOR BACTERIA: CPT

## 2023-09-29 RX ORDER — IPRATROPIUM BROMIDE AND ALBUTEROL SULFATE 2.5; .5 MG/3ML; MG/3ML
1 SOLUTION RESPIRATORY (INHALATION) EVERY 4 HOURS PRN
Status: DISCONTINUED | OUTPATIENT
Start: 2023-09-29 | End: 2023-10-04 | Stop reason: HOSPADM

## 2023-09-29 RX ORDER — NOREPINEPHRINE BITARTRATE 0.06 MG/ML
1-100 INJECTION, SOLUTION INTRAVENOUS CONTINUOUS
Status: DISCONTINUED | OUTPATIENT
Start: 2023-09-29 | End: 2023-09-30

## 2023-09-29 RX ORDER — FUROSEMIDE 10 MG/ML
40 INJECTION INTRAMUSCULAR; INTRAVENOUS ONCE
Status: COMPLETED | OUTPATIENT
Start: 2023-09-29 | End: 2023-09-29

## 2023-09-29 RX ORDER — ALBUMIN (HUMAN) 12.5 G/50ML
25 SOLUTION INTRAVENOUS ONCE
Status: COMPLETED | OUTPATIENT
Start: 2023-09-29 | End: 2023-09-29

## 2023-09-29 RX ORDER — DEXMEDETOMIDINE HYDROCHLORIDE 4 UG/ML
.1-1.5 INJECTION, SOLUTION INTRAVENOUS CONTINUOUS
Status: DISCONTINUED | OUTPATIENT
Start: 2023-09-29 | End: 2023-09-29

## 2023-09-29 RX ORDER — MAGNESIUM SULFATE IN WATER 40 MG/ML
2000 INJECTION, SOLUTION INTRAVENOUS ONCE
Status: COMPLETED | OUTPATIENT
Start: 2023-09-29 | End: 2023-09-29

## 2023-09-29 RX ORDER — FUROSEMIDE 10 MG/ML
40 INJECTION INTRAMUSCULAR; INTRAVENOUS ONCE
Status: DISCONTINUED | OUTPATIENT
Start: 2023-09-29 | End: 2023-09-29

## 2023-09-29 RX ORDER — NOREPINEPHRINE BITARTRATE 0.06 MG/ML
INJECTION, SOLUTION INTRAVENOUS
Status: COMPLETED
Start: 2023-09-29 | End: 2023-09-29

## 2023-09-29 RX ADMIN — SODIUM PHOSPHATE, MONOBASIC, MONOHYDRATE AND SODIUM PHOSPHATE, DIBASIC, ANHYDROUS 15 MMOL: 142; 276 INJECTION, SOLUTION INTRAVENOUS at 18:00

## 2023-09-29 RX ADMIN — SODIUM CHLORIDE, PRESERVATIVE FREE 10 ML: 5 INJECTION INTRAVENOUS at 20:33

## 2023-09-29 RX ADMIN — Medication 1 TABLET: at 10:07

## 2023-09-29 RX ADMIN — ACETAMINOPHEN 650 MG: 325 TABLET ORAL at 18:10

## 2023-09-29 RX ADMIN — ALBUMIN (HUMAN) 25 G: 0.25 INJECTION, SOLUTION INTRAVENOUS at 06:52

## 2023-09-29 RX ADMIN — Medication 1 TABLET: at 20:33

## 2023-09-29 RX ADMIN — SODIUM CHLORIDE, PRESERVATIVE FREE 10 ML: 5 INJECTION INTRAVENOUS at 10:08

## 2023-09-29 RX ADMIN — ENOXAPARIN SODIUM 40 MG: 100 INJECTION SUBCUTANEOUS at 10:06

## 2023-09-29 RX ADMIN — DEXMEDETOMIDINE HYDROCHLORIDE 0.2 MCG/KG/HR: 4 INJECTION, SOLUTION INTRAVENOUS at 01:30

## 2023-09-29 RX ADMIN — LORAZEPAM 0.5 MG: 0.5 TABLET ORAL at 10:07

## 2023-09-29 RX ADMIN — SODIUM CHLORIDE, PRESERVATIVE FREE 40 MG: 5 INJECTION INTRAVENOUS at 10:07

## 2023-09-29 RX ADMIN — Medication 5 MCG/MIN: at 03:34

## 2023-09-29 RX ADMIN — MAGNESIUM SULFATE HEPTAHYDRATE 2000 MG: 40 INJECTION, SOLUTION INTRAVENOUS at 07:00

## 2023-09-29 RX ADMIN — NOREPINEPHRINE BITARTRATE 5 MCG/MIN: 0.06 INJECTION, SOLUTION INTRAVENOUS at 03:34

## 2023-09-29 RX ADMIN — FUROSEMIDE 40 MG: 10 INJECTION, SOLUTION INTRAMUSCULAR; INTRAVENOUS at 01:14

## 2023-09-29 RX ADMIN — EZETIMIBE 10 MG: 10 TABLET ORAL at 10:07

## 2023-09-29 RX ADMIN — LORAZEPAM 0.5 MG: 0.5 TABLET ORAL at 20:33

## 2023-09-29 RX ADMIN — SERTRALINE HYDROCHLORIDE 25 MG: 50 TABLET ORAL at 10:07

## 2023-09-29 RX ADMIN — IPRATROPIUM BROMIDE AND ALBUTEROL SULFATE 1 DOSE: 2.5; .5 SOLUTION RESPIRATORY (INHALATION) at 01:34

## 2023-09-29 ASSESSMENT — PAIN DESCRIPTION - ORIENTATION: ORIENTATION: OTHER (COMMENT)

## 2023-09-29 ASSESSMENT — PAIN SCALES - GENERAL
PAINLEVEL_OUTOF10: 0

## 2023-09-29 ASSESSMENT — PAIN DESCRIPTION - DESCRIPTORS: DESCRIPTORS: OTHER (COMMENT)

## 2023-09-29 ASSESSMENT — PAIN DESCRIPTION - LOCATION: LOCATION: OTHER (COMMENT)

## 2023-09-29 ASSESSMENT — PAIN - FUNCTIONAL ASSESSMENT: PAIN_FUNCTIONAL_ASSESSMENT: ACTIVITIES ARE NOT PREVENTED

## 2023-09-29 NOTE — CONSULTS
[address] THERAPY EVALUATION    Tano Rinaldi, 1940, 2118/2118-A, 9/29/2023      Discharge Recommendation: ARU    History:  [place]:  There were no encounter diagnoses. Past Medical History:   Diagnosis Date    Anxiety     Cardiomegaly     Cerebral palsy ([address])     Chronic bronchitis ([address])     Compression fracture 2/2013    age of onset unknown    COPD (chronic obstructive pulmonary disease) ([address])     Depression     Environmental allergies     Essential hypertension, benign 1985    white coat hypertension    Family history of tremor     GERD (gastroesophageal reflux disease)     Hyperlipemia     dx'd 1985    Impaired glucose tolerance     Impaired glucose tolerance     Lichen sclerosus     Mild mitral regurgitation     Mild tricuspid regurgitation     per 7/2014 echo    Moderate aortic insufficiency     Osteoarthritis of lumbar spine     Osteoarthritis of thoracic spine     Osteoporosis     Sleep apnea     Vertigo          Subjective:  Patient states: \"You are so sweet! \"  Pain: denied   Communication with other providers: RN approved eval, sitter assisted with transfers   Restrictions: general precautions, fall risk, figueroa cath  Family and sitter at bedside    Home Setup/Prior level of function:  Social/Functional History  Lives With: Spouse  Type of Home: House  Home Layout: Two level, Able to Live on Main level with bedroom/bathroom  Home Access: Stairs to enter with rails  Entrance Stairs - Number of Steps: 3  Bathroom Shower/Tub: Tub/Shower unit, Shower chair with back  [employer]: Handicap height  Bathroom Equipment: Grab bars in [address] [place] [address]: David Ragsdale [facility], [address] Help From: Family  ADL Assistance: [address] Independent  Homemaking Responsibilities: Yes  Ambulation Assistance: Independent ([facility] [facility])  Transfer Assistance: Independent  Active : No  Patient's  Info: spouse  Spouse provides info d/t pt poor 3:46 PM

## 2023-09-29 NOTE — PROGRESS NOTES
V2.0  [facility] Hospitalist Progress Note      Name:  Tano Rinaldi /Age/Sex: 1940  (80 y.o. female)   MRN & CSN:  [id number] & [id number] Encounter Date/Time: 2023 12:34 PM EDT    Location:  -A PCP: Vaishali Lopez DO       Hospital Day: 3    Assessment and Plan:   Tano Rinaldi is a 80 y.o. female with pmh of   vertigo, HTN, COPD, depression, GERD, HLD, OA, anxiety, cerebral palsy  who presents with Altered mental status      Plan:  Acute Encephalopathy, unclear etiology: Could be 2/2 to  seizure disorder versus less likely infection related causing the fall and was post ictal until EMS transfer, also had severe headache prior to the fall, Required intubation for airway protection, CT head: L frontal cephalhematoma, no hemorrhage, CTA head and neck: No flow-limiting large vessel stenosis in the head and neck EEG without any evidence of seizure, UA (-),   Acute Respiratory Failure with hypoxia: Intubated for airway protection, extubated yesterday CXR: hazy opacities throughout both lungs could be edema with superimposed infection, Abx discontinued as per critical care team, overnight oxygen requirement worsened to 15 L which has improved this morning   Hypotension: Currently on Levophed, TSH wnl cortisol ordered, was started on Precedex and after that the blood pressure dropped may be the causative agent    Hyperlipidemia: Continue Zetia     Anxiety: Continue Zoloft   Osteoporosis: Continue vitamin D, Takes fosamax weekly    Diet Diet NPO   DVT Prophylaxis [x] Lovenox, []  Heparin, [] SCDs, [] Ambulation,  [] Eliquis, [] Xarelto  [] Coumadin   Code Status Full Code   Disposition From: Home  Expected Disposition: TBD  Estimated Date of Discharge: 3 days  Patient requires continued admission due to encephalopathy, suspected seizure   Surrogate Decision Maker/ POA      Subjective:     Chief Complaint: unresponsive, Fall, and Head Injury (Hematoma L forehead )       Tano Rinaldi is a 80 y.o.

## 2023-09-29 NOTE — PROGRESS NOTES
Neurology Service Progress Note  [facility]   Patient Name: Delisa Salinas  : 1940        Subjective:   CC: Seizure  Chart was reviewed in detail, patient was seen and assessed. She is awake and in bed. Talkative today. Denies headache. She can answer orientation questions appropriately but does seem confused. Less rigidity with movement of head today. Denies pain with chin to chest. No family at bedside. Did require BiPAP overnight and levophed but is back on NC today.        Past Medical History:   Diagnosis Date    Anxiety     Cardiomegaly     Cerebral palsy ([address])     Chronic bronchitis ([address])     Compression fracture 2013    age of onset unknown    COPD (chronic obstructive pulmonary disease) ([address])     Depression     Environmental allergies     Essential hypertension, benign     white coat hypertension    Family history of tremor     GERD (gastroesophageal reflux disease)     Hyperlipemia     dx'd     Impaired glucose tolerance     Impaired glucose tolerance     Lichen sclerosus     Mild mitral regurgitation     Mild tricuspid regurgitation     per 2014 echo    Moderate aortic insufficiency     Osteoarthritis of lumbar spine     Osteoarthritis of thoracic spine     Osteoporosis     Sleep apnea     Vertigo     :   Past Surgical History:   Procedure Laterality Date    CERVIX BIOPSY  2011    cone biopsy    CHOLECYSTECTOMY      COLONOSCOPY  2012    DILATION AND CURETTAGE OF UTERUS       Medications:  Scheduled Meds:   oyster shell calcium w/D  1 tablet Oral BID    ezetimibe  10 mg Oral Daily    sertraline  25 mg Oral Daily    [Held by provider] atenolol  25 mg Oral BID    pantoprazole (PROTONIX) 40 mg in sodium chloride (PF) 0.9 % 10 mL injection  40 mg IntraVENous Daily    LORazepam  0.5 mg Oral Q12H    sodium chloride flush  5-40 mL IntraVENous BID    enoxaparin  40 mg SubCUTAneous Daily     Continuous Infusions:   norepinephrine 4 mcg/min (23 0712)    sodium We will discontinue Keppra, no indication for AED at this time  Will hold on LP at this time. Lactic acid 7.9 on arrival, has remained elevated at 3.4 today  Worsening leukocytosis 11 on arrival, 14.6 today  Chest x-ray notes hazy opacities through both lungs could represent alveolar edema with superimposed infection not excluded  UA not concerning for infection  PT/OT/ST as recommended  Strict delirium precautions: Encourage wakefulness during the day time hours with lights on, blinds open, out of bed. Reorient as needed. Minimize disruption to sleep during night time hours as able. No further recommendations, we will sign off please contact us with any new concerns. Patient was discussed with attending neurologist Dr. Susie Chamorro, Dr. Emigdio Prajapati      Thank you for allowing us to participate in the care of your patient. If there are any questions regarding evaluation please feel free to contact us.      JULIETTE Lai - CNP, 9/29/2023

## 2023-09-29 NOTE — PROGRESS NOTES
Pt has not had recent full Pulmonary Function Testing. Last was in 2014.  Recommend that patient complete PFT testing post discharge and recovery

## 2023-09-29 NOTE — PROGRESS NOTES
Facility/Department: [facility] ICU   CLINICAL BEDSIDE SWALLOW EVALUATION    NAME: Hershel Closs  : 1940  MRN: [id number]    IMPRESSIONS AND RECOMMENDATIONS: Hershel Closs was referred for a bedside swallow evaluation following admission to [facility] s/p fall with acute encephalopathy, acute respiratory failure, hypotension. She was intubated on mechanical ventilation -. Medical hx includes COPD, HTN, HLD, CP, GERD. No known history of dysphagia prior to admission. Pt seen for evaluation seated upright in bed, alert, cooperative. Daughter present throughout. Oral mechanism examination grossly WFL without asymmetry. Noted pt is edentulous. Baseline vocal quality dysphonic with hoarseness/breathiness and reduced coordination of respiration/phonation resulting in decreased intelligibility. She was presented with PO trials of ice chips, thin liquids via tsp/cup/straw, puree, and regular solids. Oral stage mild-moderately impaired, characterized by intact labial seal, prolonged mastication, adequate eventual clearance given liquid wash. She demonstrated reduced coordination of mastication/respiration resulting in occasional anterior loss from PO \"blown\" out of the oral cavity by forceful exhalation. She was not able to correct this given cues but did demonstrate awareness of it. Pharyngeal stage appears grossly WFL with adequate swallow initiation/laryngeal elevation and no s/s aspiration. Discussed that airway protection may be compromised by impaired vocal fold function. Recommend initiation of soft/bite-sized diet and thin liquids with strict aspiration precautions. Give pills in pureed or pudding consistency only. Ensure pt is upright for all PO and cue for small bites/drinks and slow rate of intake as needed. SLP will continued to follow. D/w pt, daughter, and RN.      ADMISSION DATE: 2023  ADMITTING DIAGNOSIS: has Chronic bronchitis ([address]); Lichen sclerosus;  Impaired glucose tolerance; HTN bed       Therapy Time  SLP Individual Minutes  Time In: 6905  Time Out: 0684  Minutes: [place]  9/29/2023 2:54 PM

## 2023-09-29 NOTE — PROGRESS NOTES
I have personally seen and examined the patient independently. I have reviewed the patient's available data,including medical history and recent test results. Reviewed and discussed note as documented by DAYAMI. I agree with the physical exam findings, assessment and plan. My documented MDM is a substantive portion of the supervisory note. 40 minutes     Encephalopathy, etiology uncertain question of seizure at time of presentation (?  Benzodiazepine withdrawal), no documented seizures today  Acute hypoxemia, impaired airway control, extubated following admission, overnight progressive hypoxemia suspected acute pulmonary edema (cardiogenic) as etiology  Fall injury, minor head trauma  History hypertension  History hyperlipidemia  History of COPD  History of GERD     Continue supplemental oxygen  Continue hemodynamic support low-dose Levophed (note the patient experienced significantly fluctuating blood pressures overnight, severe hypertension and subsequent hypotension)  Periodic diuresis as tolerated hemodynamically  Echocardiogram for assessment heart function  Monitor clinical status off antimicrobial therapy, no compelling evidence of either pneumonia, urinary tract infection or other site of infection  Ulcer, DVT prophylaxis  Bronchodilators  Obtain cortisol level  Appreciate neurology input           Complex decisions required for today's evaluation management, reviewed during critical care rounds.     E Cordasco  [phone]    40 minutes

## 2023-09-29 NOTE — PROCEDURES
ROUTINE ELECTROENCEPHALOGRAM    Identifying Information:  Name: Jesus Andersen  MRN: [id number]  : 1940  Interpreting Physician: Michel Vasquez DO  Referring Provider: JULIETTE Chambers  Date of EE23  Procedure Location: Inpatient [facility]     Clinical History:  Jesus Andersen is a 80 y.o. female with a reported history of anxiety, vertigo, HTN, COPD, depression, HLD, OA, CP presenting to [address] [place] [address] with altered mental status after fall. There is concern for seizures. Current Medications:    magnesium sulfate  2,000 mg IntraVENous Once    oyster shell calcium w/D  1 tablet Oral BID    ezetimibe  10 mg Oral Daily    sertraline  25 mg Oral Daily    atenolol  25 mg Oral BID    pantoprazole (PROTONIX) 40 mg in sodium chloride (PF) 0.9 % 10 mL injection  40 mg IntraVENous Daily    LORazepam  0.5 mg Oral Q12H    sodium chloride flush  5-40 mL IntraVENous BID    enoxaparin  40 mg SubCUTAneous Daily        Indication:  Rule out seizure/seizure disorder     Technical Summary:  28 channels of EEG were recorded in a digital format on a patient who is reported to be awake and drowsy during the recording. The patient was not sleep deprived prior to the EEG. The background consists of 5-6 Hz activity in the theta frequency range. It is fairly symmetric though better formed on the left, normal voltage and continuous. Poorly formed posterior dominant rhythm (PDR) is present and it is reactive to stimulation. The record was remarkable for the presence of: Intermittent focal attenuation with possible intermittent focal slowing in the form of polymorphic delta activity over the right temporal head region. Photic stimulation performed and did not produce any abnormalities. During the recording stage II sleep  was not seen but drowsiness did occur. The EKG lead revealed no rhythm abnormalties.        EEG Interpretation:   The EEG was abnormal due to the presence of:   Focal

## 2023-09-29 NOTE — CARE COORDINATION
Cm in to see pt for follow up. Pt now has a sitter at bedside( had been trying to climb out of bed yesterday.) Pt continues to know her name, where she is, who her family members are etc. Pt's  and dgt from [facility] in to visit.  states that they live in a 2 story home with bed/bath on main floor and laundry in the basement. Hsb drives. Hsb the laundry, cleaning and cooking for the pair. Pt has never had HC nor been to SNF. CM expressed concern that pt may need HC or short term rehab at discharge. SNF list and [facility]. lists provided to family for review. CM to follow.

## 2023-09-29 NOTE — PROGRESS NOTES
Pt with acute onset dyspnea, increased work of breathing, hypoxia and hypertension. Pt increased O2 demand from 2L NC to 15L NRB. Improved from 70s to low 90s on pulse oximetry. Noted poor air movement bilaterally to auscultation with audible crackles. NP notified. CXR, 40mg Lasix IVP, labs, and bipap ordered. Pt dyspnea and WOB improved with these interventions, however remained agitated with frequent pulling of mask. Precedex gtt ordered and started. Pt then became hypotensive and bradycardic. Precedex was stopped and discontinued. BP did not improve and Levophed was started with improvement. Bipap 15/5 50%    Urine studies ordered and albumin added x1 dose 25g.

## 2023-09-29 NOTE — CONSULTS
[address] THERAPY EVALUATION    Nish Harrison, 1940, 2118/2118-A, 9/29/2023      Discharge Recommendation: ***    History:  [place]:  There were no encounter diagnoses.   Past Medical History:   Diagnosis Date    Anxiety     Cardiomegaly     Cerebral palsy ([address])     Chronic bronchitis (HCC)     Compression fracture 2/2013    age of onset unknown    COPD (chronic obstructive pulmonary disease) ([address])     Depression     Environmental allergies     Essential hypertension, benign 1985    white coat hypertension    Family history of tremor     GERD (gastroesophageal reflux disease)     Hyperlipemia     dx'd 1985    Impaired glucose tolerance     Impaired glucose tolerance     Lichen sclerosus     Mild mitral regurgitation     Mild tricuspid regurgitation     per 7/2014 echo    Moderate aortic insufficiency     Osteoarthritis of lumbar spine     Osteoarthritis of thoracic spine     Osteoporosis     Sleep apnea     Vertigo          Subjective:  Patient states: \"***\"  Pain: denied   Communication with other providers: RN ok'd eval   Restrictions: general precautions, fall risk, ***  Family at bedside    Home Setup/Prior level of function:  Social/Functional History  Lives With: Spouse  Type of Home: House  Home Layout: Two level, Able to Live on Main level with bedroom/bathroom  Home Access: Stairs to enter with rails  Entrance Stairs - Number of Steps: 3  Bathroom Shower/Tub: Tub/Shower unit, Shower chair with back  [employer]: Handicap height  Bathroom Equipment: Grab bars in shower  Home Equipment: Heena Weston, [address], [id number] [address] [place] Help From: Family  ADL Assistance: Independent  Homemaking Assistance: Independent  Homemaking Responsibilities: Yes  Ambulation Assistance: Independent ([address])  Transfer Assistance: Independent  Active : No  Patient's  Info: spouse    Examination:  Observation: Pt was in bed upon arrival, agreeable to session  Vision: [facility]  Hearing:

## 2023-09-29 NOTE — PROGRESS NOTES
09/29/23 1138   Encounter Summary   Encounter Overview/Reason  Attempted Encounter   Service Provided For: Patient not available  (PT with  and nurses.)   Referral/Consult From: [address] Family members   Last Encounter  09/29/23  (PT with  and nurses.)   Complexity of Encounter Low   Begin Time 1132   End Time  1139   Total Time Calculated 7 min   Assessment/Intervention/Outcome   Assessment Calm   Intervention Sustaining Presence/Ministry of presence   Plan and Referrals   Plan/Referrals Continue to visit, (comment)  (follow-up)

## 2023-09-29 NOTE — PROGRESS NOTES
V2.0  [facility] [place]      Name:  Boris Rodriguez /Age/Sex: 1940  (80 y.o. female)   MRN & CSN:  [id number] & [id number] Encounter Date/Time: 2023 9:39 AM EDT    Location:  -A PCP: Claribel Seth DO       Hospital Day: 3    Assessment and Plan:   Boris Rodriguez is a 80 y.o. female who presents with Altered mental status    Acute Encephalopathy  Headache  Acute Respiratory Failure - Resolved  Lactic Acidosis  Essential Hypertension  Hyperlipidemia  Depression  Anxiety  Osteoporosis    Neuro: Confused, complained of headache prior to arrival.  Was briefly on Precedex overnight, now off. Sitter at bedside, continue. Concern for possible seizure. Ceribell with no evidence of seizure. EEG with no electrographic graphic seizure or nonconvulsive status elliptica's, moderate generalized slowing. HCT negative for acute intracranial abnormality. Takes chronic benzos at home, question of withdrawal.  Tox screen positive for benzodiazepines and fentanyl. Neurology consulted, appreciate recommendations. Cardio: Started on pressors overnight, continue. Wean as able to keep MAP greater than 65. Home atenolol placed on hold. Continue telemetry monitoring. Echocardiogram ordered, pending. Pulm: On nasal cannula, doing well. Concern for asp pn; will monitor for now. Chest x-ray reviewed, significant for pulmonary edema. GI: Tolerating PO diet. GI prophylaxis: Pantoprazole. : No acute issues, figueroa catheter draining clear yellow urine. ID: WBC 14.9; lactic now normal.  Started on Rocephin, discontinued. Monitor clinically. Heme: Hgb 14.9; no acute issues. DVT prophylaxis: Lovenox. MSK: Fall at home with resulting scalp hematoma; PT/OT consult when able      Diet ADULT DIET;  Regular   DVT Prophylaxis [x] Lovenox, []  Heparin, [x] SCDs, [] Ambulation,  [] Eliquis, [] Xarelto  [] Coumadin   Code Status Full Code   Disposition From: Home  Expected Disposition: Left frontal cephalhematoma. CT cervical spine: 1. No acute fracture of the cervical spine identified. 2. Findings suggesting pulmonary edema at the partially imaged lung apices. CT CERVICAL SPINE WO CONTRAST    Result Date: 9/27/2023  EXAMINATION: CT OF THE HEAD WITHOUT CONTRAST; CT OF THE CERVICAL SPINE WITHOUT CONTRAST 9/27/2023 7:26 pm TECHNIQUE: CT of the head was performed without the administration of intravenous contrast. Automated exposure control, iterative reconstruction, and/or weight based adjustment of the mA/kV was utilized to reduce the radiation dose to as low as reasonably achievable.; CT of the cervical spine was performed without the administration of intravenous contrast. Multiplanar reformatted images are provided for review. Automated exposure control, iterative reconstruction, and/or weight based adjustment of the mA/kV was utilized to reduce the radiation dose to as low as reasonably achievable. COMPARISON: None. HISTORY: ORDERING SYSTEM PROVIDED HISTORY: ams TECHNOLOGIST PROVIDED HISTORY: Has a \"code stroke\" or \"stroke alert\" been called? ->No Reason for exam:->ams Decision Support Exception - unselect if not a suspected or confirmed emergency medical condition->Emergency Medical Condition (MA) Reason for Exam: ams; ORDERING SYSTEM PROVIDED HISTORY: trauma TECHNOLOGIST PROVIDED HISTORY: Reason for exam:->trauma Decision Support Exception - unselect if not a suspected or confirmed emergency medical condition->Emergency Medical Condition (MA) Reason for Exam: trauma FINDINGS: CT HEAD: BRAIN/VENTRICLES: There is no acute intracranial hemorrhage, mass effect or midline shift. No abnormal extra-axial fluid collection. The gray-white differentiation is maintained without evidence of an acute infarct. There is no evidence of hydrocephalus. There is mild age appropriate global cerebral atrophy. ORBITS: The visualized portion of the orbits demonstrate no acute abnormality.  SINUSES: The

## 2023-09-30 LAB
ANION GAP SERPL CALCULATED.3IONS-SCNC: 13 MMOL/L (ref 4–16)
ANION GAP SERPL CALCULATED.3IONS-SCNC: 9 MMOL/L (ref 4–16)
BASOPHILS ABSOLUTE: 0 K/CU MM
BASOPHILS RELATIVE PERCENT: 0.4 % (ref 0–1)
BUN SERPL-MCNC: 13 MG/DL (ref 6–23)
BUN SERPL-MCNC: 13 MG/DL (ref 6–23)
CALCIUM SERPL-MCNC: 8.4 MG/DL (ref 8.3–10.6)
CALCIUM SERPL-MCNC: 9.2 MG/DL (ref 8.3–10.6)
CHLORIDE BLD-SCNC: 94 MMOL/L (ref 99–110)
CHLORIDE BLD-SCNC: 97 MMOL/L (ref 99–110)
CO2: 22 MMOL/L (ref 21–32)
CO2: 28 MMOL/L (ref 21–32)
CREAT SERPL-MCNC: 0.8 MG/DL (ref 0.6–1.1)
CREAT SERPL-MCNC: 0.8 MG/DL (ref 0.6–1.1)
DIFFERENTIAL TYPE: ABNORMAL
EOSINOPHILS ABSOLUTE: 0.3 K/CU MM
EOSINOPHILS RELATIVE PERCENT: 3.7 % (ref 0–3)
GFR SERPL CREATININE-BSD FRML MDRD: >60 ML/MIN/1.73M2
GFR SERPL CREATININE-BSD FRML MDRD: >60 ML/MIN/1.73M2
GLUCOSE SERPL-MCNC: 114 MG/DL (ref 70–99)
GLUCOSE SERPL-MCNC: 122 MG/DL (ref 70–99)
HCT VFR BLD CALC: 37.2 % (ref 37–47)
HEMOGLOBIN: 12.3 GM/DL (ref 12.5–16)
IMMATURE NEUTROPHIL %: 0.3 % (ref 0–0.43)
LYMPHOCYTES ABSOLUTE: 1.6 K/CU MM
LYMPHOCYTES RELATIVE PERCENT: 21.5 % (ref 24–44)
MAGNESIUM: 2.3 MG/DL (ref 1.8–2.4)
MAGNESIUM: 2.4 MG/DL (ref 1.8–2.4)
MCH RBC QN AUTO: 29.1 PG (ref 27–31)
MCHC RBC AUTO-ENTMCNC: 33.1 % (ref 32–36)
MCV RBC AUTO: 88.2 FL (ref 78–100)
MONOCYTES ABSOLUTE: 1 K/CU MM
MONOCYTES RELATIVE PERCENT: 14 % (ref 0–4)
NUCLEATED RBC %: 0 %
PDW BLD-RTO: 11.7 % (ref 11.7–14.9)
PHOSPHORUS: 1.8 MG/DL (ref 2.5–4.9)
PHOSPHORUS: 3.2 MG/DL (ref 2.5–4.9)
PLATELET # BLD: 179 K/CU MM (ref 140–440)
PMV BLD AUTO: 10.7 FL (ref 7.5–11.1)
POTASSIUM SERPL-SCNC: 3.3 MMOL/L (ref 3.5–5.1)
POTASSIUM SERPL-SCNC: 4.4 MMOL/L (ref 3.5–5.1)
RBC # BLD: 4.22 M/CU MM (ref 4.2–5.4)
SEGMENTED NEUTROPHILS ABSOLUTE COUNT: 4.4 K/CU MM
SEGMENTED NEUTROPHILS RELATIVE PERCENT: 60.1 % (ref 36–66)
SODIUM BLD-SCNC: 129 MMOL/L (ref 135–145)
SODIUM BLD-SCNC: 134 MMOL/L (ref 135–145)
TOTAL IMMATURE NEUTOROPHIL: 0.02 K/CU MM
TOTAL NUCLEATED RBC: 0 K/CU MM
WBC # BLD: 7.4 K/CU MM (ref 4–10.5)

## 2023-09-30 PROCEDURE — 2580000003 HC RX 258: Performed by: STUDENT IN AN ORGANIZED HEALTH CARE EDUCATION/TRAINING PROGRAM

## 2023-09-30 PROCEDURE — 6370000000 HC RX 637 (ALT 250 FOR IP)

## 2023-09-30 PROCEDURE — 6360000002 HC RX W HCPCS: Performed by: STUDENT IN AN ORGANIZED HEALTH CARE EDUCATION/TRAINING PROGRAM

## 2023-09-30 PROCEDURE — 85025 COMPLETE CBC W/AUTO DIFF WBC: CPT

## 2023-09-30 PROCEDURE — 84100 ASSAY OF PHOSPHORUS: CPT

## 2023-09-30 PROCEDURE — 6360000002 HC RX W HCPCS

## 2023-09-30 PROCEDURE — 94761 N-INVAS EAR/PLS OXIMETRY MLT: CPT

## 2023-09-30 PROCEDURE — 2580000003 HC RX 258

## 2023-09-30 PROCEDURE — C9113 INJ PANTOPRAZOLE SODIUM, VIA: HCPCS

## 2023-09-30 PROCEDURE — 83735 ASSAY OF MAGNESIUM: CPT

## 2023-09-30 PROCEDURE — 6360000002 HC RX W HCPCS: Performed by: NURSE PRACTITIONER

## 2023-09-30 PROCEDURE — 80048 BASIC METABOLIC PNL TOTAL CA: CPT

## 2023-09-30 PROCEDURE — 6370000000 HC RX 637 (ALT 250 FOR IP): Performed by: SPECIALIST

## 2023-09-30 PROCEDURE — 2700000000 HC OXYGEN THERAPY PER DAY

## 2023-09-30 PROCEDURE — 6370000000 HC RX 637 (ALT 250 FOR IP): Performed by: PHYSICIAN ASSISTANT

## 2023-09-30 PROCEDURE — 2000000000 HC ICU R&B

## 2023-09-30 RX ORDER — POTASSIUM CHLORIDE 7.45 MG/ML
10 INJECTION INTRAVENOUS PRN
Status: DISCONTINUED | OUTPATIENT
Start: 2023-09-30 | End: 2023-10-04 | Stop reason: HOSPADM

## 2023-09-30 RX ORDER — ATENOLOL 25 MG/1
12.5 TABLET ORAL 2 TIMES DAILY
Status: DISCONTINUED | OUTPATIENT
Start: 2023-09-30 | End: 2023-10-04 | Stop reason: HOSPADM

## 2023-09-30 RX ORDER — MAGNESIUM SULFATE IN WATER 40 MG/ML
2000 INJECTION, SOLUTION INTRAVENOUS PRN
Status: DISCONTINUED | OUTPATIENT
Start: 2023-09-30 | End: 2023-10-04 | Stop reason: HOSPADM

## 2023-09-30 RX ORDER — POTASSIUM CHLORIDE 20 MEQ/1
40 TABLET, EXTENDED RELEASE ORAL PRN
Status: DISCONTINUED | OUTPATIENT
Start: 2023-09-30 | End: 2023-10-04 | Stop reason: HOSPADM

## 2023-09-30 RX ADMIN — Medication 1 TABLET: at 09:25

## 2023-09-30 RX ADMIN — HYDRALAZINE HYDROCHLORIDE 10 MG: 20 INJECTION INTRAMUSCULAR; INTRAVENOUS at 09:24

## 2023-09-30 RX ADMIN — SERTRALINE HYDROCHLORIDE 25 MG: 50 TABLET ORAL at 09:25

## 2023-09-30 RX ADMIN — SODIUM CHLORIDE, PRESERVATIVE FREE 10 ML: 5 INJECTION INTRAVENOUS at 09:26

## 2023-09-30 RX ADMIN — HYDRALAZINE HYDROCHLORIDE 10 MG: 20 INJECTION INTRAMUSCULAR; INTRAVENOUS at 17:47

## 2023-09-30 RX ADMIN — ENOXAPARIN SODIUM 40 MG: 100 INJECTION SUBCUTANEOUS at 09:25

## 2023-09-30 RX ADMIN — LORAZEPAM 0.5 MG: 0.5 TABLET ORAL at 09:25

## 2023-09-30 RX ADMIN — ATENOLOL 12.5 MG: 25 TABLET ORAL at 20:53

## 2023-09-30 RX ADMIN — SODIUM CHLORIDE, PRESERVATIVE FREE 40 MG: 5 INJECTION INTRAVENOUS at 09:25

## 2023-09-30 RX ADMIN — EZETIMIBE 10 MG: 10 TABLET ORAL at 09:25

## 2023-09-30 RX ADMIN — SODIUM CHLORIDE, PRESERVATIVE FREE 10 ML: 5 INJECTION INTRAVENOUS at 21:04

## 2023-09-30 RX ADMIN — LORAZEPAM 0.5 MG: 0.5 TABLET ORAL at 20:53

## 2023-09-30 RX ADMIN — Medication 1 TABLET: at 20:53

## 2023-09-30 RX ADMIN — POTASSIUM CHLORIDE 40 MEQ: 1500 TABLET, EXTENDED RELEASE ORAL at 06:41

## 2023-09-30 ASSESSMENT — PAIN SCALES - GENERAL: PAINLEVEL_OUTOF10: 0

## 2023-09-30 NOTE — PROGRESS NOTES
V2.0  [facility] [place] [facility] Critical [facility] Progress Note      Name:  Milo Rainey /Age/Sex: 1940  (80 y.o. female)   MRN & CSN:  [id number] & [id number] Encounter Date/Time: 2023 9:39 AM EDT    Location:  -A PCP: Sherren Ferris, DO       Hospital Day: 4    Assessment and Plan:   Milo Rainey is a 80 y.o. female who presents with Altered mental status    Acute Encephalopathy, unknown etiology  Headache  Acute Respiratory Failure - Resolved  Lactic Acidosis  Essential Hypertension  Hyperlipidemia  Depression  Anxiety  Osteoporosis    Neuro: More awake and oriented today x3,   complained of headache prior to arrival.   Sitter at bedside, continue. Concern for possible seizure. Ceribell with no evidence of seizure. EEG with no electrographic graphic seizure or nonconvulsive status elliptica's, moderate generalized slowing. HCT negative for acute intracranial abnormality. Takes chronic benzos at home, question of withdrawal.  Tox screen positive for benzodiazepines and fentanyl. Neurology consulted, appreciate recommendations. Cardio: Off pressors this am, Home atenolol resumed at half dose with hold parameters. F/u Orthaostats, Continue telemetry monitoring. Echocardiogram --EF 55 to 60%, mildly dilated left atrium  Pulm: On nasal cannula, doing well. Concern for asp pn; will monitor for now. Chest x-ray reviewed from , significant for pulmonary edema. GI: Tolerating PO diet. GI prophylaxis: Pantoprazole. : UOP net neg 3L yesterday , No acute issues, replacing 40 meq po potassium, figueroa catheter draining clear yellow urine. ID: WBC 7.4; lactic now normal.  D/nohemi Rocephin, Monitor clinically. Heme: Hgb 12.3; no acute issues. DVT prophylaxis: Lovenox. MSK: Fall at home with resulting scalp hematoma; PT/OT consult when able      Diet ADULT DIET;  Dysphagia - Soft and Bite Sized   DVT Prophylaxis [x] Lovenox, []  Heparin, [x] SCDs, [] Ambulation,  [] Eliquis, [] Xarelto  [] CT OF THE CERVICAL SPINE WITHOUT CONTRAST 9/27/2023 7:26 pm TECHNIQUE: CT of the head was performed without the administration of intravenous contrast. Automated exposure control, iterative reconstruction, and/or weight based adjustment of the mA/kV was utilized to reduce the radiation dose to as low as reasonably achievable.; CT of the cervical spine was performed without the administration of intravenous contrast. Multiplanar reformatted images are provided for review. Automated exposure control, iterative reconstruction, and/or weight based adjustment of the mA/kV was utilized to reduce the radiation dose to as low as reasonably achievable. COMPARISON: None. HISTORY: ORDERING SYSTEM PROVIDED HISTORY: ams TECHNOLOGIST PROVIDED HISTORY: Has a \"code stroke\" or \"stroke alert\" been called? ->No Reason for exam:->ams Decision Support Exception - unselect if not a suspected or confirmed emergency medical condition->Emergency Medical Condition (MA) Reason for Exam: ams; ORDERING SYSTEM PROVIDED HISTORY: trauma TECHNOLOGIST PROVIDED HISTORY: Reason for exam:->trauma Decision Support Exception - unselect if not a suspected or confirmed emergency medical condition->Emergency Medical Condition (MA) Reason for Exam: trauma FINDINGS: CT HEAD: BRAIN/VENTRICLES: There is no acute intracranial hemorrhage, mass effect or midline shift. No abnormal extra-axial fluid collection. The gray-white differentiation is maintained without evidence of an acute infarct. There is no evidence of hydrocephalus. There is mild age appropriate global cerebral atrophy. ORBITS: The visualized portion of the orbits demonstrate no acute abnormality. SINUSES: The visualized paranasal sinuses and mastoid air cells demonstrate no acute abnormality. SOFT TISSUES/SKULL: No acute abnormality of the visualized skull. Small soft tissue contusion/cephalhematoma of the left frontal scalp.  CT CERVICAL SPINE: BONES/ALIGNMENT: There is no acute fracture or

## 2023-09-30 NOTE — PROGRESS NOTES
I have personally seen and examined the patient independently. I have reviewed the patient's available data,including medical history and recent test results. Reviewed and discussed note as documented by DAYAMI. I agree with the physical exam findings, assessment and plan. My documented MDM is a substantive portion of the supervisory note. 40 minutes     Encephalopathy (presumably metabolic), etiology uncertain question of seizure at time of presentation (?  Benzodiazepine withdrawal), no documented seizures to date, other neurological etiology  Acute hypoxemic respiratory failure, pulmonary edema as etiology likely due to hypertension (aspiration possible, less likely)  Fall injury, minor head trauma  History hypertension  History hyperlipidemia  History of COPD  History of GERD     Continue supplemental oxygen  Periodic diuresis as tolerated hemodynamically  Echocardiogram for assessment heart function  Monitor clinical status off antimicrobial therapy, no compelling evidence of either pneumonia, urinary tract infection or other site of infection  Ulcer, DVT prophylaxis  Bronchodilators  Appreciate neurology input  Advance diet, mobility         Complex decisions required for today's evaluation management, reviewed during critical care rounds.     [facility]  [phone]

## 2023-09-30 NOTE — PROGRESS NOTES
V2.0  [facility] [place] Hospital[place]      Name:  Maurizio Mederos /Age/Sex: 1940  (80 y.o. female)   MRN & CSN:  [id number] & [id number] Encounter Date/Time: 2023 12:34 PM EDT    Location:  -A PCP: Romario Deshpande DO       Hospital Day: 4    Assessment and Plan:   Maurizio Mederos is a 80 y.o. female with pmh of   vertigo, HTN, COPD, depression, GERD, HLD, OA, anxiety, cerebral palsy  who presents with Altered mental status      Plan:  Acute Encephalopathy: unclear etiology: Could be 2/2 to  seizure disorder versus less likely infection related causing the fall and was post ictal until EMS transfer, also had severe headache prior to the fall, Required intubation for airway protection, CT head: L frontal cephalhematoma, no hemorrhage, CTA head and neck: No flow-limiting large vessel stenosis in the head and neck EEG without any evidence of seizure, UA (-),   Acute Respiratory Failure with hypoxia: Likely secondary to fluid overload currently on 2 L oxygen by nasal cannula on Lasix as tolerated. Hypotension: Currently on Levophed, TSH wnl cortisol ordered, was started on Precedex and after that the blood pressure dropped may be the causative agent, resolved    Hyperlipidemia: Continue Zetia     Anxiety: Continue Zoloft   Osteoporosis: Continue vitamin D, Takes fosamax weekly    Diet ADULT DIET; Dysphagia - Soft and Bite Sized   DVT Prophylaxis [x] Lovenox, []  Heparin, [] SCDs, [] Ambulation,  [] Eliquis, [] Xarelto  [] Coumadin   Code Status Full Code   Disposition From: Home  Expected Disposition: TBD  Estimated Date of Discharge: 3 days  Patient requires continued admission due to encephalopathy, suspected seizure   Surrogate Decision Maker/ POA      Subjective:     Chief Complaint: unresponsive, Fall, and Head Injury (Hematoma L forehead )       Maurizio Mederos is a 80 y.o. female who presents with severe headache and fall.   Seen and examined at bedside alert and awake follows simple MM    Total Immature Neutrophil 0.02 K/CU MM    Immature Neutrophil % 0.3 0 - 0.43 %   Critical Care Panel    Collection Time: 09/30/23  4:24 AM   Result Value Ref Range    Sodium 134 (L) 135 - 145 MMOL/L    Potassium 3.3 (L) 3.5 - 5.1 MMOL/L    Chloride 97 (L) 99 - 110 mMol/L    CO2 28 21 - 32 MMOL/L    Anion Gap 9 4 - 16    Glucose 114 (H) 70 - 99 MG/DL    BUN 13 6 - 23 MG/DL    Creatinine 0.8 0.6 - 1.1 MG/DL    Est, Glom Filt Rate >60 >60 mL/min/1.73m2    Calcium 8.4 8.3 - 10.6 MG/DL    Phosphorus 3.2 2.5 - 4.9 MG/DL    Magnesium 2.4 1.8 - 2.4 mg/dl        Imaging/Diagnostics Last 24 Hours   CT HEAD WO CONTRAST    Result Date: 9/28/2023  EXAMINATION: CT OF THE HEAD WITHOUT CONTRAST  9/28/2023 1:27 am TECHNIQUE: CT of the head was performed without the administration of intravenous contrast. Automated exposure control, iterative reconstruction, and/or weight based adjustment of the mA/kV was utilized to reduce the radiation dose to as low as reasonably achievable. COMPARISON: None. HISTORY: ORDERING SYSTEM PROVIDED HISTORY: repeat CT to rule out slow hemorrhage TECHNOLOGIST PROVIDED HISTORY: Reason for exam:->repeat CT to rule out slow hemorrhage Has a \"code stroke\" or \"stroke alert\" been called? ->No Reason for Exam: repeat CT to rule out slow hemorrhage FINDINGS: BRAIN/VENTRICLES: There is no acute intracranial hemorrhage, mass effect or midline shift. No abnormal extra-axial fluid collection. The gray-white differentiation is maintained without evidence of an acute infarct. There is no evidence of hydrocephalus. No interval change on CT scan of brain as compared to previous CT scan of brain on 09/27/2023 at 1917 hours. ORBITS: The visualized portion of the orbits demonstrate no acute abnormality. SINUSES: The visualized paranasal sinuses and mastoid air cells demonstrate no acute abnormality.  SOFT TISSUES/SKULL:  Previously noted hematoma in the anterior left frontal scalp appears to be wider in size but likely to be decreased in thickness. This hematoma currently measures 4.4 cm transversely, 3.0 cm craniocaudad and 0.4 cm in thickness as compared to previous size of 2.7 cm x 2.6 cm x 0.77 cm. There is no evidence of intracranial, intra-axial or extra-axial hemorrhage. No definable focal abnormality or acute process in brain. No interval change in brain as compared to previous CT scan on 09/27/2023. The left anterior frontal scalp hematoma is wider in size but thinner in thickness. CTA HEAD NECK W CONTRAST    Result Date: 9/27/2023  EXAMINATION: CTA OF THE HEAD AND NECK WITH CONTRAST 9/27/2023 9:22 pm: TECHNIQUE: CTA of the head and neck was performed with the administration of intravenous contrast. Multiplanar reformatted images are provided for review. MIP images are provided for review. Stenosis of the internal carotid arteries measured using NASCET criteria. Automated exposure control, iterative reconstruction, and/or weight based adjustment of the mA/kV was utilized to reduce the radiation dose to as low as reasonably achievable. COMPARISON: None. HISTORY: Reason for Exam: syncope/acute AMS FINDINGS: CTA NECK: Motion artifact is present. AORTIC ARCH/ARCH VESSELS: No dissection or arterial injury. No significant stenosis of the brachiocephalic or subclavian arteries. CAROTID ARTERIES: No dissection, arterial injury, or hemodynamically significant stenosis by NASCET criteria. VERTEBRAL ARTERIES: No dissection, arterial injury, or significant stenosis. SOFT TISSUES: Changes of edema in the visualized lungs. No cervical or superior mediastinal lymphadenopathy. The larynx and pharynx are unremarkable. There is a 2.2 cm left thyroid nodule. BONES: No acute osseous abnormality. CTA HEAD: Motion artifact is present. ANTERIOR CIRCULATION: No significant stenosis of the intracranial internal carotid, anterior cerebral, or middle cerebral arteries. No aneurysm.  POSTERIOR CIRCULATION: No significant stenosis

## 2023-10-01 ENCOUNTER — APPOINTMENT (OUTPATIENT)
Dept: CT IMAGING | Age: 83
DRG: 208 | End: 2023-10-01
Payer: MEDICARE

## 2023-10-01 VITALS
HEIGHT: 63 IN | WEIGHT: 182.1 LBS | BODY MASS INDEX: 32.27 KG/M2 | TEMPERATURE: 99.7 F | OXYGEN SATURATION: 95 % | HEART RATE: 84 BPM | SYSTOLIC BLOOD PRESSURE: 166 MMHG | RESPIRATION RATE: 16 BRPM | DIASTOLIC BLOOD PRESSURE: 61 MMHG

## 2023-10-01 LAB
ANION GAP SERPL CALCULATED.3IONS-SCNC: 10 MMOL/L (ref 4–16)
ANION GAP SERPL CALCULATED.3IONS-SCNC: 9 MMOL/L (ref 4–16)
BUN SERPL-MCNC: 13 MG/DL (ref 6–23)
BUN SERPL-MCNC: 15 MG/DL (ref 6–23)
CALCIUM SERPL-MCNC: 8.7 MG/DL (ref 8.3–10.6)
CALCIUM SERPL-MCNC: 9.3 MG/DL (ref 8.3–10.6)
CHLORIDE BLD-SCNC: 94 MMOL/L (ref 99–110)
CHLORIDE BLD-SCNC: 96 MMOL/L (ref 99–110)
CO2: 23 MMOL/L (ref 21–32)
CO2: 25 MMOL/L (ref 21–32)
CREAT SERPL-MCNC: 0.7 MG/DL (ref 0.6–1.1)
CREAT SERPL-MCNC: 0.8 MG/DL (ref 0.6–1.1)
CULTURE: ABNORMAL
CULTURE: ABNORMAL
CULTURE: NORMAL
GFR SERPL CREATININE-BSD FRML MDRD: >60 ML/MIN/1.73M2
GFR SERPL CREATININE-BSD FRML MDRD: >60 ML/MIN/1.73M2
GLUCOSE SERPL-MCNC: 129 MG/DL (ref 70–99)
GLUCOSE SERPL-MCNC: 134 MG/DL (ref 70–99)
Lab: ABNORMAL
Lab: NORMAL
MAGNESIUM: 2 MG/DL (ref 1.8–2.4)
MAGNESIUM: 2.1 MG/DL (ref 1.8–2.4)
PHOSPHORUS: 2.2 MG/DL (ref 2.5–4.9)
PHOSPHORUS: 2.9 MG/DL (ref 2.5–4.9)
POTASSIUM SERPL-SCNC: 4.2 MMOL/L (ref 3.5–5.1)
POTASSIUM SERPL-SCNC: 4.2 MMOL/L (ref 3.5–5.1)
SODIUM BLD-SCNC: 127 MMOL/L (ref 135–145)
SODIUM BLD-SCNC: 130 MMOL/L (ref 135–145)
SPECIMEN: ABNORMAL
SPECIMEN: NORMAL

## 2023-10-01 PROCEDURE — A4216 STERILE WATER/SALINE, 10 ML: HCPCS

## 2023-10-01 PROCEDURE — 2500000003 HC RX 250 WO HCPCS

## 2023-10-01 PROCEDURE — 6360000002 HC RX W HCPCS

## 2023-10-01 PROCEDURE — 80048 BASIC METABOLIC PNL TOTAL CA: CPT

## 2023-10-01 PROCEDURE — 6370000000 HC RX 637 (ALT 250 FOR IP)

## 2023-10-01 PROCEDURE — 6360000002 HC RX W HCPCS: Performed by: STUDENT IN AN ORGANIZED HEALTH CARE EDUCATION/TRAINING PROGRAM

## 2023-10-01 PROCEDURE — 6370000000 HC RX 637 (ALT 250 FOR IP): Performed by: SPECIALIST

## 2023-10-01 PROCEDURE — 6360000002 HC RX W HCPCS: Performed by: NURSE PRACTITIONER

## 2023-10-01 PROCEDURE — 2580000003 HC RX 258: Performed by: SPECIALIST

## 2023-10-01 PROCEDURE — 6370000000 HC RX 637 (ALT 250 FOR IP): Performed by: STUDENT IN AN ORGANIZED HEALTH CARE EDUCATION/TRAINING PROGRAM

## 2023-10-01 PROCEDURE — 2580000003 HC RX 258: Performed by: STUDENT IN AN ORGANIZED HEALTH CARE EDUCATION/TRAINING PROGRAM

## 2023-10-01 PROCEDURE — 6370000000 HC RX 637 (ALT 250 FOR IP): Performed by: PHYSICIAN ASSISTANT

## 2023-10-01 PROCEDURE — C9113 INJ PANTOPRAZOLE SODIUM, VIA: HCPCS

## 2023-10-01 PROCEDURE — 70450 CT HEAD/BRAIN W/O DYE: CPT

## 2023-10-01 PROCEDURE — 94761 N-INVAS EAR/PLS OXIMETRY MLT: CPT

## 2023-10-01 PROCEDURE — 2000000000 HC ICU R&B

## 2023-10-01 PROCEDURE — 84100 ASSAY OF PHOSPHORUS: CPT

## 2023-10-01 PROCEDURE — A4216 STERILE WATER/SALINE, 10 ML: HCPCS | Performed by: STUDENT IN AN ORGANIZED HEALTH CARE EDUCATION/TRAINING PROGRAM

## 2023-10-01 PROCEDURE — 6360000002 HC RX W HCPCS: Performed by: SPECIALIST

## 2023-10-01 PROCEDURE — 83735 ASSAY OF MAGNESIUM: CPT

## 2023-10-01 PROCEDURE — 2580000003 HC RX 258

## 2023-10-01 RX ADMIN — SODIUM CHLORIDE, PRESERVATIVE FREE 10 ML: 5 INJECTION INTRAVENOUS at 20:47

## 2023-10-01 RX ADMIN — Medication 1 TABLET: at 08:44

## 2023-10-01 RX ADMIN — HYDRALAZINE HYDROCHLORIDE 10 MG: 20 INJECTION INTRAMUSCULAR; INTRAVENOUS at 00:24

## 2023-10-01 RX ADMIN — ATENOLOL 12.5 MG: 25 TABLET ORAL at 08:43

## 2023-10-01 RX ADMIN — SERTRALINE HYDROCHLORIDE 25 MG: 50 TABLET ORAL at 08:44

## 2023-10-01 RX ADMIN — SODIUM CHLORIDE, PRESERVATIVE FREE 40 MG: 5 INJECTION INTRAVENOUS at 08:44

## 2023-10-01 RX ADMIN — LORAZEPAM 0.5 MG: 0.5 TABLET ORAL at 20:15

## 2023-10-01 RX ADMIN — CEFTRIAXONE SODIUM 1000 MG: 1 INJECTION, POWDER, FOR SOLUTION INTRAMUSCULAR; INTRAVENOUS at 09:42

## 2023-10-01 RX ADMIN — ENOXAPARIN SODIUM 40 MG: 100 INJECTION SUBCUTANEOUS at 08:44

## 2023-10-01 RX ADMIN — SODIUM CHLORIDE, PRESERVATIVE FREE 10 ML: 5 INJECTION INTRAVENOUS at 08:44

## 2023-10-01 RX ADMIN — EZETIMIBE 10 MG: 10 TABLET ORAL at 08:43

## 2023-10-01 RX ADMIN — ACETAMINOPHEN 650 MG: 325 TABLET ORAL at 08:45

## 2023-10-01 RX ADMIN — SODIUM PHOSPHATE, MONOBASIC, MONOHYDRATE AND SODIUM PHOSPHATE, DIBASIC, ANHYDROUS 15 MMOL: 142; 276 INJECTION, SOLUTION INTRAVENOUS at 10:38

## 2023-10-01 RX ADMIN — ATENOLOL 12.5 MG: 25 TABLET ORAL at 20:15

## 2023-10-01 RX ADMIN — LORAZEPAM 0.5 MG: 0.5 TABLET ORAL at 08:43

## 2023-10-01 RX ADMIN — Medication 1 TABLET: at 20:15

## 2023-10-01 RX ADMIN — ACETAMINOPHEN 650 MG: 325 TABLET ORAL at 00:57

## 2023-10-01 RX ADMIN — HYDRALAZINE HYDROCHLORIDE 10 MG: 20 INJECTION INTRAMUSCULAR; INTRAVENOUS at 20:53

## 2023-10-01 ASSESSMENT — PAIN SCALES - GENERAL
PAINLEVEL_OUTOF10: 0
PAINLEVEL_OUTOF10: 0
PAINLEVEL_OUTOF10: 3
PAINLEVEL_OUTOF10: 10
PAINLEVEL_OUTOF10: 3

## 2023-10-01 ASSESSMENT — PAIN DESCRIPTION - DESCRIPTORS: DESCRIPTORS: OTHER (COMMENT)

## 2023-10-01 ASSESSMENT — PAIN DESCRIPTION - LOCATION
LOCATION: HEAD
LOCATION: HEAD

## 2023-10-01 ASSESSMENT — PAIN DESCRIPTION - ORIENTATION: ORIENTATION: ANTERIOR;MID

## 2023-10-01 ASSESSMENT — PAIN SCALES - WONG BAKER: WONGBAKER_NUMERICALRESPONSE: 0

## 2023-10-01 ASSESSMENT — PAIN - FUNCTIONAL ASSESSMENT: PAIN_FUNCTIONAL_ASSESSMENT: ACTIVITIES ARE NOT PREVENTED

## 2023-10-01 NOTE — PROGRESS NOTES
I have personally seen and examined the patient independently. I have reviewed the patient's available data,including medical history and recent test results. Reviewed and discussed note as documented by DAYAMI. I agree with the physical exam findings, assessment and plan. My documented MDM is a substantive portion of the supervisory note. 39 minutes     Encephalopathy (presumably metabolic), etiology uncertain question of seizure at time of presentation (?  Benzodiazepine withdrawal), no documented seizures to date, other neurological etiology  Acute hypoxemic respiratory failure, pulmonary edema as etiology likely due to hypertension (aspiration possible, less likely), resolving  Fall injury, minor head trauma  Evolving hyponatremia  ? Group B streptococcal bacteremia (although only 1/4 blood cultures positive)  History hypertension  History hyperlipidemia  History of COPD  History of GERD     Continue supplemental oxygen  Stop diuresis given hyponatremia  Short course Rocephin for ? bacteremia  Ulcer, DVT prophylaxis  Bronchodilators  Re scan head given \"worsening headsche, recent fall injury\"  Advance diet, mobility         Complex decisions required for today's evaluation management, reviewed during critical care rounds.     Lesly Muse  [phone]

## 2023-10-01 NOTE — PROGRESS NOTES
V2.0  [facility] [place]      Name:  Elvia Alfonso /Age/Sex: 1940  (80 y.o. female)   MRN & CSN:  [id number] & [id number] Encounter Date/Time: 10/1/2023 9:39 AM EDT    Location:  -A PCP: Harlie Sandifer, DO       Hospital Day: 5    Assessment and Plan:   Elvia Alfonso is a 80 y.o. female who presents with Altered mental status    Acute Encephalopathy, unknown etiology  Headache  Acute Respiratory Failure - Resolved  Lactic Acidosis  Essential Hypertension  Hyperlipidemia  Depression  Anxiety  Osteoporosis    Neuro: More awake and oriented today x3,   complained of headache prior to arrival.  Reports headache still present. Repeat CT head ordered, pending. Ceribell with no evidence of seizure. EEG with no electrographic graphic seizure or nonconvulsive status elliptica's, moderate generalized slowing. HCT negative for acute intracranial abnormality. Takes chronic benzos at home, question of withdrawal.  Tox screen positive for benzodiazepines and fentanyl. Neurology consulted, appreciate recommendations. Cardio: Off pressors this am, Home atenolol resumed at half dose with hold parameters. Continue telemetry monitoring. Echocardiogram --EF 55 to 60%, mildly dilated left atrium  Pulm: On nasal cannula, doing well. Concern for asp pn; will monitor for now. Chest x-ray reviewed from , significant for pulmonary edema. GI: Tolerating PO diet. GI prophylaxis: Pantoprazole. :  No acute issues, figueroa catheter draining clear yellow urine,  continue. ID: Normalized WBCs, lactic now normal.  ? Group B streptococcal bacteremia (although only 1/4 blood cultures positive). Resumed Rocephin. Continue to monitor clinically. Follow-up blood cultures. Heme: no acute issues. DVT prophylaxis: Lovenox. MSK: Fall at home with resulting scalp hematoma; PT/OT consult when able      Diet ADULT DIET;  Dysphagia - Soft and Bite Sized   DVT Prophylaxis [x] Lovenox, []  Heparin, WITHOUT CONTRAST 9/27/2023 7:26 pm TECHNIQUE: CT of the head was performed without the administration of intravenous contrast. Automated exposure control, iterative reconstruction, and/or weight based adjustment of the mA/kV was utilized to reduce the radiation dose to as low as reasonably achievable.; CT of the cervical spine was performed without the administration of intravenous contrast. Multiplanar reformatted images are provided for review. Automated exposure control, iterative reconstruction, and/or weight based adjustment of the mA/kV was utilized to reduce the radiation dose to as low as reasonably achievable. COMPARISON: None. HISTORY: ORDERING SYSTEM PROVIDED HISTORY: ams TECHNOLOGIST PROVIDED HISTORY: Has a \"code stroke\" or \"stroke alert\" been called? ->No Reason for exam:->ams Decision Support Exception - unselect if not a suspected or confirmed emergency medical condition->Emergency Medical Condition (MA) Reason for Exam: ams; ORDERING SYSTEM PROVIDED HISTORY: trauma TECHNOLOGIST PROVIDED HISTORY: Reason for exam:->trauma Decision Support Exception - unselect if not a suspected or confirmed emergency medical condition->Emergency Medical Condition (MA) Reason for Exam: trauma FINDINGS: CT HEAD: BRAIN/VENTRICLES: There is no acute intracranial hemorrhage, mass effect or midline shift. No abnormal extra-axial fluid collection. The gray-white differentiation is maintained without evidence of an acute infarct. There is no evidence of hydrocephalus. There is mild age appropriate global cerebral atrophy. ORBITS: The visualized portion of the orbits demonstrate no acute abnormality. SINUSES: The visualized paranasal sinuses and mastoid air cells demonstrate no acute abnormality. SOFT TISSUES/SKULL: No acute abnormality of the visualized skull. Small soft tissue contusion/cephalhematoma of the left frontal scalp. CT CERVICAL SPINE: BONES/ALIGNMENT: There is no acute fracture or traumatic malalignment.

## 2023-10-01 NOTE — PROGRESS NOTES
V2.0  [facility] Note      Name:  Esther Pruett /Age/Sex: 1940  (80 y.o. female)   MRN & CSN:  [id number] & [id number] Encounter Date/Time: 10/1/2023 12:34 PM EDT    Location:  -A PCP: Radha Mcginnis, [facility] [place] [address] Day: 5    Assessment and Plan:   Esther Pruett is a 80 y.o. female with pmh of   vertigo, HTN, COPD, depression, GERD, HLD, OA, anxiety, cerebral palsy  who presents with Altered mental status      Plan:  Acute Encephalopathy: unclear etiology: Could be 2/2 to  seizure disorder versus less likely infection related causing the fall and was post ictal until EMS transfer, also had severe headache prior to the fall, Required intubation for airway protection, CT head: L frontal cephalhematoma, no hemorrhage, CTA head and neck: No flow-limiting large vessel stenosis in the head and neck EEG without any evidence of seizure, UA (-),   Streptococcus will induce bacteremia: Can be a contaminant as 1 out of 4 cultures positive but looking at the overall clinical picture Rocephin has been initiated for 7-day course. Acute Respiratory Failure with hypoxia: Likely secondary to fluid overload currently on 2 L oxygen by nasal cannula on Lasix as tolerated. Hypotension: Off Levophed drip, hypotension has resolved  Hyperlipidemia: Continue Zetia     Anxiety: Continue Zoloft and on Ativan 0.5 mg p.o. every 12 hourly  Osteoporosis: Continue vitamin D, Takes fosamax weekly    Diet ADULT DIET; Dysphagia - Soft and Bite Sized   DVT Prophylaxis [x] Lovenox, []  Heparin, [] SCDs, [] Ambulation,  [] Eliquis, [] Xarelto  [] Coumadin   Code Status Full Code   Disposition From: Home  Expected Disposition: TBD  Estimated Date of Discharge: 3 days  Patient requires continued admission due to encephalopathy, suspected seizure   Surrogate Decision Maker/ POA      Subjective:     Chief Complaint: unresponsive, Fall, and Head Injury (Hematoma L forehead )       Esther Pruett is a 80 y.o. SPINE: BONES/ALIGNMENT: There is no acute fracture or traumatic malalignment. DEGENERATIVE CHANGES: Multilevel mild spondylosis and mild-to-moderate facet arthropathy of the cervical spine. Solid osseous fusion of the posterior elements at C2-C3. Partial osseous fusion of the C2-C3 vertebral bodies. SOFT TISSUES: There is no prevertebral soft tissue swelling. Partially imaged lung apices demonstrate interlobular septal thickening and ground-glass opacity favored to reflect pulmonary edema. Endotracheal tube in place. CT head: 1. No acute intracranial process identified. 2. Left frontal cephalhematoma. CT cervical spine: 1. No acute fracture of the cervical spine identified. 2. Findings suggesting pulmonary edema at the partially imaged lung apices. XR CHEST PORTABLE    Result Date: 9/27/2023  EXAMINATION: ONE XRAY VIEW OF THE CHEST 9/27/2023 7:15 pm COMPARISON: 07/10/2017 HISTORY: ORDERING SYSTEM PROVIDED HISTORY: intubation TECHNOLOGIST PROVIDED HISTORY: Reason for exam:->intubation Reason for Exam: intubation Additional signs and symptoms: na Relevant Medical/Surgical History: COPD FINDINGS: Endotracheal tube tip 3 cm above the annie. Hazy opacities throughout both lungs. Mild cardiomegaly with vascular congestion and Kerley B lines. No pleural effusion or pneumothorax. No acute osseous abnormality. Mild cardiomegaly and interstitial edema. Hazy opacities throughout both lungs could represent alveolar edema with superimposed infection not excluded.        Electronically signed by Valente Manuel MD on 10/1/2023 at 10:48 AM

## 2023-10-02 ENCOUNTER — APPOINTMENT (OUTPATIENT)
Dept: GENERAL RADIOLOGY | Age: 83
DRG: 208 | End: 2023-10-02
Payer: MEDICARE

## 2023-10-02 PROBLEM — F13.932 BENZODIAZEPINE WITHDRAWAL WITH PERCEPTUAL DISTURBANCE (HCC): Status: ACTIVE | Noted: 2023-10-02

## 2023-10-02 PROBLEM — F05 DELIRIUM DUE TO MEDICAL CONDITION WITH BEHAVIORAL DISTURBANCE: Status: ACTIVE | Noted: 2023-10-02

## 2023-10-02 PROBLEM — Z79.899 CHRONIC PRESCRIPTION BENZODIAZEPINE USE: Status: ACTIVE | Noted: 2023-10-02

## 2023-10-02 LAB
ANION GAP SERPL CALCULATED.3IONS-SCNC: 13 MMOL/L (ref 4–16)
BASOPHILS ABSOLUTE: 0 K/CU MM
BASOPHILS RELATIVE PERCENT: 0.5 % (ref 0–1)
BUN SERPL-MCNC: 14 MG/DL (ref 6–23)
CALCIUM SERPL-MCNC: 9.1 MG/DL (ref 8.3–10.6)
CHLORIDE BLD-SCNC: 96 MMOL/L (ref 99–110)
CO2: 21 MMOL/L (ref 21–32)
CREAT SERPL-MCNC: 0.7 MG/DL (ref 0.6–1.1)
CULTURE: NORMAL
DIFFERENTIAL TYPE: ABNORMAL
EOSINOPHILS ABSOLUTE: 0.2 K/CU MM
EOSINOPHILS RELATIVE PERCENT: 2.5 % (ref 0–3)
GFR SERPL CREATININE-BSD FRML MDRD: >60 ML/MIN/1.73M2
GLUCOSE SERPL-MCNC: 123 MG/DL (ref 70–99)
HCT VFR BLD CALC: 37.7 % (ref 37–47)
HEMOGLOBIN: 12.6 GM/DL (ref 12.5–16)
IMMATURE NEUTROPHIL %: 0.3 % (ref 0–0.43)
LYMPHOCYTES ABSOLUTE: 1.2 K/CU MM
LYMPHOCYTES RELATIVE PERCENT: 14 % (ref 24–44)
Lab: NORMAL
MAGNESIUM: 2 MG/DL (ref 1.8–2.4)
MCH RBC QN AUTO: 29.2 PG (ref 27–31)
MCHC RBC AUTO-ENTMCNC: 33.4 % (ref 32–36)
MCV RBC AUTO: 87.3 FL (ref 78–100)
MONOCYTES ABSOLUTE: 1.2 K/CU MM
MONOCYTES RELATIVE PERCENT: 13.9 % (ref 0–4)
NUCLEATED RBC %: 0 %
PDW BLD-RTO: 12 % (ref 11.7–14.9)
PHOSPHORUS: 3.1 MG/DL (ref 2.5–4.9)
PLATELET # BLD: 234 K/CU MM (ref 140–440)
PMV BLD AUTO: 9.9 FL (ref 7.5–11.1)
POTASSIUM SERPL-SCNC: 4.1 MMOL/L (ref 3.5–5.1)
RBC # BLD: 4.32 M/CU MM (ref 4.2–5.4)
SEGMENTED NEUTROPHILS ABSOLUTE COUNT: 6 K/CU MM
SEGMENTED NEUTROPHILS RELATIVE PERCENT: 68.8 % (ref 36–66)
SODIUM BLD-SCNC: 130 MMOL/L (ref 135–145)
SPECIMEN: NORMAL
TOTAL IMMATURE NEUTOROPHIL: 0.03 K/CU MM
TOTAL NUCLEATED RBC: 0 K/CU MM
WBC # BLD: 8.7 K/CU MM (ref 4–10.5)

## 2023-10-02 PROCEDURE — 71045 X-RAY EXAM CHEST 1 VIEW: CPT

## 2023-10-02 PROCEDURE — 6370000000 HC RX 637 (ALT 250 FOR IP): Performed by: STUDENT IN AN ORGANIZED HEALTH CARE EDUCATION/TRAINING PROGRAM

## 2023-10-02 PROCEDURE — 97530 THERAPEUTIC ACTIVITIES: CPT

## 2023-10-02 PROCEDURE — 6360000002 HC RX W HCPCS: Performed by: STUDENT IN AN ORGANIZED HEALTH CARE EDUCATION/TRAINING PROGRAM

## 2023-10-02 PROCEDURE — 6370000000 HC RX 637 (ALT 250 FOR IP): Performed by: SPECIALIST

## 2023-10-02 PROCEDURE — 6370000000 HC RX 637 (ALT 250 FOR IP)

## 2023-10-02 PROCEDURE — 97162 PT EVAL MOD COMPLEX 30 MIN: CPT

## 2023-10-02 PROCEDURE — C9113 INJ PANTOPRAZOLE SODIUM, VIA: HCPCS

## 2023-10-02 PROCEDURE — 92526 ORAL FUNCTION THERAPY: CPT

## 2023-10-02 PROCEDURE — 85025 COMPLETE CBC W/AUTO DIFF WBC: CPT

## 2023-10-02 PROCEDURE — 6360000002 HC RX W HCPCS: Performed by: SPECIALIST

## 2023-10-02 PROCEDURE — 6370000000 HC RX 637 (ALT 250 FOR IP): Performed by: PHYSICIAN ASSISTANT

## 2023-10-02 PROCEDURE — 2580000003 HC RX 258: Performed by: STUDENT IN AN ORGANIZED HEALTH CARE EDUCATION/TRAINING PROGRAM

## 2023-10-02 PROCEDURE — 2580000003 HC RX 258: Performed by: SPECIALIST

## 2023-10-02 PROCEDURE — 2580000003 HC RX 258

## 2023-10-02 PROCEDURE — 94761 N-INVAS EAR/PLS OXIMETRY MLT: CPT

## 2023-10-02 PROCEDURE — 87040 BLOOD CULTURE FOR BACTERIA: CPT

## 2023-10-02 PROCEDURE — 99221 1ST HOSP IP/OBS SF/LOW 40: CPT | Performed by: NURSE PRACTITIONER

## 2023-10-02 PROCEDURE — 80048 BASIC METABOLIC PNL TOTAL CA: CPT

## 2023-10-02 PROCEDURE — A4216 STERILE WATER/SALINE, 10 ML: HCPCS

## 2023-10-02 PROCEDURE — 2000000000 HC ICU R&B

## 2023-10-02 PROCEDURE — 6370000000 HC RX 637 (ALT 250 FOR IP): Performed by: NURSE PRACTITIONER

## 2023-10-02 PROCEDURE — 6360000002 HC RX W HCPCS

## 2023-10-02 PROCEDURE — 83735 ASSAY OF MAGNESIUM: CPT

## 2023-10-02 PROCEDURE — 6360000002 HC RX W HCPCS: Performed by: NURSE PRACTITIONER

## 2023-10-02 PROCEDURE — 84100 ASSAY OF PHOSPHORUS: CPT

## 2023-10-02 PROCEDURE — 94640 AIRWAY INHALATION TREATMENT: CPT

## 2023-10-02 RX ORDER — HALOPERIDOL 5 MG/ML
5 INJECTION INTRAMUSCULAR ONCE
Status: COMPLETED | OUTPATIENT
Start: 2023-10-02 | End: 2023-10-02

## 2023-10-02 RX ORDER — LORAZEPAM 0.5 MG/1
0.25 TABLET ORAL 2 TIMES DAILY
Status: DISCONTINUED | OUTPATIENT
Start: 2023-10-02 | End: 2023-10-04 | Stop reason: HOSPADM

## 2023-10-02 RX ORDER — QUETIAPINE FUMARATE 25 MG/1
25 TABLET, FILM COATED ORAL NIGHTLY
Status: DISCONTINUED | OUTPATIENT
Start: 2023-10-02 | End: 2023-10-04 | Stop reason: HOSPADM

## 2023-10-02 RX ORDER — QUETIAPINE FUMARATE 25 MG/1
12.5 TABLET, FILM COATED ORAL DAILY PRN
Status: DISCONTINUED | OUTPATIENT
Start: 2023-10-03 | End: 2023-10-04 | Stop reason: HOSPADM

## 2023-10-02 RX ORDER — PANTOPRAZOLE SODIUM 40 MG/1
40 TABLET, DELAYED RELEASE ORAL
Status: DISCONTINUED | OUTPATIENT
Start: 2023-10-03 | End: 2023-10-04 | Stop reason: HOSPADM

## 2023-10-02 RX ORDER — LORAZEPAM 0.5 MG/1
0.25 TABLET ORAL 2 TIMES DAILY
Status: DISCONTINUED | OUTPATIENT
Start: 2023-10-02 | End: 2023-10-02

## 2023-10-02 RX ORDER — LORAZEPAM 0.5 MG/1
0.25 TABLET ORAL EVERY OTHER DAY
Status: DISCONTINUED | OUTPATIENT
Start: 2023-10-17 | End: 2023-10-04 | Stop reason: HOSPADM

## 2023-10-02 RX ORDER — LORAZEPAM 0.5 MG/1
0.25 TABLET ORAL DAILY
Status: DISCONTINUED | OUTPATIENT
Start: 2023-10-10 | End: 2023-10-02

## 2023-10-02 RX ORDER — LORAZEPAM 0.5 MG/1
0.25 TABLET ORAL DAILY
Status: DISCONTINUED | OUTPATIENT
Start: 2023-10-10 | End: 2023-10-04 | Stop reason: HOSPADM

## 2023-10-02 RX ORDER — VALSARTAN AND HYDROCHLOROTHIAZIDE 320; 12.5 MG/1; MG/1
1 TABLET, FILM COATED ORAL DAILY
Status: DISCONTINUED | OUTPATIENT
Start: 2023-10-02 | End: 2023-10-04 | Stop reason: HOSPADM

## 2023-10-02 RX ORDER — LORAZEPAM 0.5 MG/1
0.5 TABLET ORAL EVERY OTHER DAY
Status: DISCONTINUED | OUTPATIENT
Start: 2023-10-17 | End: 2023-10-02

## 2023-10-02 RX ADMIN — Medication 1 TABLET: at 09:17

## 2023-10-02 RX ADMIN — LABETALOL HYDROCHLORIDE 10 MG: 5 INJECTION, SOLUTION INTRAVENOUS at 01:14

## 2023-10-02 RX ADMIN — ATENOLOL 12.5 MG: 25 TABLET ORAL at 19:51

## 2023-10-02 RX ADMIN — CEFTRIAXONE SODIUM 1000 MG: 1 INJECTION, POWDER, FOR SOLUTION INTRAMUSCULAR; INTRAVENOUS at 11:35

## 2023-10-02 RX ADMIN — SODIUM CHLORIDE, PRESERVATIVE FREE 10 ML: 5 INJECTION INTRAVENOUS at 09:17

## 2023-10-02 RX ADMIN — LORAZEPAM 0.25 MG: 0.5 TABLET ORAL at 19:50

## 2023-10-02 RX ADMIN — HYDRALAZINE HYDROCHLORIDE 10 MG: 20 INJECTION INTRAMUSCULAR; INTRAVENOUS at 17:37

## 2023-10-02 RX ADMIN — LORAZEPAM 0.5 MG: 0.5 TABLET ORAL at 09:17

## 2023-10-02 RX ADMIN — IPRATROPIUM BROMIDE AND ALBUTEROL SULFATE 1 DOSE: 2.5; .5 SOLUTION RESPIRATORY (INHALATION) at 19:23

## 2023-10-02 RX ADMIN — ACETAMINOPHEN 650 MG: 325 TABLET ORAL at 18:32

## 2023-10-02 RX ADMIN — ENOXAPARIN SODIUM 40 MG: 100 INJECTION SUBCUTANEOUS at 09:18

## 2023-10-02 RX ADMIN — Medication 1 TABLET: at 19:50

## 2023-10-02 RX ADMIN — QUETIAPINE FUMARATE 25 MG: 25 TABLET ORAL at 19:50

## 2023-10-02 RX ADMIN — SODIUM CHLORIDE, PRESERVATIVE FREE 40 MG: 5 INJECTION INTRAVENOUS at 09:18

## 2023-10-02 RX ADMIN — SERTRALINE HYDROCHLORIDE 25 MG: 50 TABLET ORAL at 09:17

## 2023-10-02 RX ADMIN — SODIUM CHLORIDE, PRESERVATIVE FREE 10 ML: 5 INJECTION INTRAVENOUS at 19:54

## 2023-10-02 RX ADMIN — ATENOLOL 12.5 MG: 25 TABLET ORAL at 09:36

## 2023-10-02 RX ADMIN — VALSARTAN AND HYDROCHLOROTHIAZIDE 1 TABLET: 320; 12.5 TABLET, FILM COATED ORAL at 12:27

## 2023-10-02 RX ADMIN — EZETIMIBE 10 MG: 10 TABLET ORAL at 09:17

## 2023-10-02 RX ADMIN — HALOPERIDOL LACTATE 5 MG: 5 INJECTION, SOLUTION INTRAMUSCULAR at 02:05

## 2023-10-02 RX ADMIN — HYDRALAZINE HYDROCHLORIDE 10 MG: 20 INJECTION INTRAMUSCULAR; INTRAVENOUS at 05:12

## 2023-10-02 RX ADMIN — CEFTRIAXONE SODIUM 1000 MG: 1 INJECTION, POWDER, FOR SOLUTION INTRAMUSCULAR; INTRAVENOUS at 09:30

## 2023-10-02 RX ADMIN — IPRATROPIUM BROMIDE AND ALBUTEROL SULFATE 1 DOSE: 2.5; .5 SOLUTION RESPIRATORY (INHALATION) at 11:52

## 2023-10-02 ASSESSMENT — PAIN DESCRIPTION - PAIN TYPE: TYPE: ACUTE PAIN;CHRONIC PAIN

## 2023-10-02 ASSESSMENT — PAIN SCALES - GENERAL
PAINLEVEL_OUTOF10: 0
PAINLEVEL_OUTOF10: 3
PAINLEVEL_OUTOF10: 0

## 2023-10-02 ASSESSMENT — PAIN DESCRIPTION - LOCATION: LOCATION: HEAD

## 2023-10-02 ASSESSMENT — PAIN DESCRIPTION - ONSET: ONSET: ON-GOING

## 2023-10-02 ASSESSMENT — PAIN DESCRIPTION - FREQUENCY: FREQUENCY: CONTINUOUS

## 2023-10-02 ASSESSMENT — PAIN - FUNCTIONAL ASSESSMENT: PAIN_FUNCTIONAL_ASSESSMENT: ACTIVITIES ARE NOT PREVENTED

## 2023-10-02 ASSESSMENT — PAIN DESCRIPTION - ORIENTATION: ORIENTATION: MID

## 2023-10-02 ASSESSMENT — PAIN DESCRIPTION - DESCRIPTORS: DESCRIPTORS: ACHING

## 2023-10-02 NOTE — CARE COORDINATION
Follow up with pt. Pt's , pt's dgt and step dgt at bedside. Pt continues with sitter. Pt appears alert and oriented at present. PT/OT evals reviewed and rec for ARU noted.  CM discussed ARU with pt and family including the 3 hr daily therapy requirement and they are in agreement with referral. Cm continued to encourage review of SNF list with a few choices should ARU be unable to accept pt.    1510 Cm contacted ARU admissions with referral.

## 2023-10-02 NOTE — CONSULTS
spouse    Examination of body systems (includes body structures/functions, activity/participation limitations):  Observation:  pt supine in bed with sitter present upon arrival and agreeable to therapy  Vision:  [facility]  Hearing:  slightly [place]  Cardiopulmonary:  no O2 needs  Cognition: slightly impaired, see OT/SLP note for further evaluation. Musculoskeletal  ROM R/L:  WFL. Strength R/L:  4-/5, moderate impairment in function and endurance. Neuro:  WFL      Mobility:  Rolling L/R:  SBA  Supine to sit:  SBA with cues for sequencing  Transfers: pt completed stand step transfer from EOB to chair CGA with RW with cues for sequencing  Sitting balance:  good. Standing balance:  fair+. Gait: pt deferred    [facility] 6 Clicks Inpatient Mobility:  AM-PAC Inpatient Mobility Raw Score : 14    Safety: patient left in chair with sitter in room, call light within reach, RN notified, gait belt used. Assessment:  Pt is an 80 y.o. female admitted to the hospital for AMS and a fall. Pt was intubated from 9/27-9/28/2023. Pt is typically mod I with RW for all ambulation and transfers. Pt is currently performing bed mobility SBA, transferring CGA, and did not ambulate this date. Pt is presenting with decreased endurance, impaired transfers, impaired gait, impaired strength, impaired balance. Pt would benefit from continued acute care PT as well as ARU placement upon discharge to continue to address impairments. Complexity: moderate    Prognosis: Good, no significant barriers to participation at this time.      General Plan: 3-5 times per week       Equipment: TBD at next level of care    Goals:  Short Term Goals  Time Frame for Short Term Goals: 1 week  Short Term Goal 1: pt to complete all bed mobility mod I  Short Term Goal 2: pt to complete all STS transfers to/from bed, commode, and chair SBA  Short Term Goal 3: pt to ambulate 22' with LRAD CGA       Treatment plan:  Bed mobility, transfers, balance, gait, TA, TX    Recommendations for NURSING mobility: stand pivot with RW and gait belt    Time:   Time in: 1028  Time out: 1042  Timed treatment minutes: 0  Total time: 14    Electronically signed by:    Car Malloy PT  10/2/2023, 1:30 PM

## 2023-10-02 NOTE — CONSULTS
Initial Psychiatric History and Physical    Maddie Padron  [id number]  9/27/2023  10/02/23    ID: Patient is a 80 yrs y.o. female    CC:\"I have been through a lot of psychiatry\"    HPI: Patient is an 80year old female with PMHx of  vertigo, HTN, COPD, depression, GERD, HLD, OA, anxiety, cerebral palsy who presents to [facility] ED via EMS s/p fall vs seizures? with concerns of HA and vomiting at home.  found pt at home unresponsive and while enroute in EMS became combative and confused. Pt was given midazolam. Pt intubated on 9/27/23 and transferred to ICU with acute encephalopathy and acute respiratory failure with hypoxia. Pt extubated on 9/28/23. Patient has briefly required precedex due to agitation on 9/28/23. Pt continues to be agitated, trying to get out of bed but this is slowly improving. Ativan was started 0.5 mg q12 hours. Sitter at bedside. Consults include neurology and CC. Psychiatry consulted by Dr Fco Paniagua due to \"generalized anxiety disorder. \"    Met with patient at bedside. The patient is up in the chair, the room is well lit, the TV is on and the sitter is at bedside. Daughter, step daughter and \"hubby\" are at bedside and stay during interview per patient request. The patient is alert and oriented to person, place, situation but not year stating it is \"2024\". She states she came to the hospital because \"I fell at home and the squad brought me in\". She states that she has had counseling in the past and \"shock therapy in the 60s and was on  an inpatient phychiatric escobedo\". She points at her daughter and states \"her daddy was not easy to live with\". The patient states that she is sleeping ok \"4-6 hours per night\" but her appetite is not good. The patient admits she does not leave the house much due to having \"Agoraphobia: She will ride in the car with her  at times. Stressors include her brother's recent stroke and illness with her sister.  Her physician recently increased her lorazepam to mL/min/1.73m2    Calcium 9.1 8.3 - 10.6 MG/DL    Phosphorus 3.1 2.5 - 4.9 MG/DL    Magnesium 2.0 1.8 - 2.4 mg/dl   CBC with Auto Differential    Collection Time: 10/02/23  4:55 AM   Result Value Ref Range    WBC 8.7 4.0 - 10.5 K/CU MM    RBC 4.32 4.2 - 5.4 M/CU MM    Hemoglobin 12.6 12.5 - 16.0 GM/DL    Hematocrit 37.7 37 - 47 %    MCV 87.3 78 - 100 FL    MCH 29.2 27 - 31 PG    MCHC 33.4 32.0 - 36.0 %    RDW 12.0 11.7 - 14.9 %    Platelets 737 200 - 220 K/CU MM    MPV 9.9 7.5 - 11.1 FL    Differential Type AUTOMATED DIFFERENTIAL     Segs Relative 68.8 (H) 36 - 66 %    Lymphocytes % 14.0 (L) 24 - 44 %    Monocytes % 13.9 (H) 0 - 4 %    Eosinophils % 2.5 0 - 3 %    Basophils % 0.5 0 - 1 %    Segs Absolute 6.0 K/CU MM    Lymphocytes Absolute 1.2 K/CU MM    Monocytes Absolute 1.2 K/CU MM    Eosinophils Absolute 0.2 K/CU MM    Basophils Absolute 0.0 K/CU MM    Nucleated RBC % 0.0 %    Total Nucleated RBC 0.0 K/CU MM    Total Immature Neutrophil 0.03 K/CU MM    Immature Neutrophil % 0.3 0 - 0.43 %       Review of Systems:  Reports of no current cardiovascular, respiratory, gastrointestinal, genitourinary, integumentary, neurological, muscuoskeletal, or immunological symptoms today. PSYCHIATRIC: See HPI above.     PSYCHIATRIC EXAMINATION / MENTAL STATUS EXAM    CONSTITUTIONAL:    Vitals:   Vitals:    10/02/23 1153   BP:    Pulse:    Resp:    Temp:    SpO2: 98%      General appearance: [x] appears age, []  appears older than stated age,               []  adequately dressed and groomed, [x] disheveled,               [x]  in no acute distress, [] appears mildly distressed, [] other           MUSCULOSKELETAL:   Gait:   [] normal, [] antalgic, [] unsteady, [x] gait not evaluated   Station:             [] erect, [x] sitting, [] recumbent, [] other        Strength/tone:  [x] muscle strength and tone appear consistent with age and                                        condition     [] atrophy      [] abnormal movements

## 2023-10-02 NOTE — PROGRESS NOTES
Occupational Therapy Treatment Note  Name: Tano Rinaldi MRN: [id number] :   1940   Date:  10/2/2023   Admission Date: 2023 Room:  -A     Primary Problem:  The primary encounter diagnosis was Acute alteration in mental status. Diagnoses of Acute lactic acidosis and Closed head injury, initial encounter were also pertinent to this visit. Past Medical History:   Diagnosis Date    Anxiety     Cardiomegaly     Cerebral palsy ([address])     Chronic bronchitis (HCC)     Compression fracture 2013    age of onset unknown    COPD (chronic obstructive pulmonary disease) ([address])     Depression     Environmental allergies     Essential hypertension, benign     white coat hypertension    Family history of tremor     GERD (gastroesophageal reflux disease)     Hyperlipemia     dx'd     Impaired glucose tolerance     Impaired glucose tolerance     Lichen sclerosus     Mild mitral regurgitation     Mild tricuspid regurgitation     per 2014 echo    Moderate aortic insufficiency     Osteoarthritis of lumbar spine     Osteoarthritis of thoracic spine     Osteoporosis     Sleep apnea     Vertigo          Communication with other providers:  RN, PT     Subjective:  Patient states:  \"I been wanting to do this\"  Pain: denied   Restrictions: general, fall    at bedside    Objective:    Observation:  pt was in bed upon arrival, agreeable to session    Treatment, including education:    Therapeutic Activity Training:   Therapeutic activity training was instructed today. Cues were given for safety, sequence, UE/LE placement, visual cues, and balance. Activities performed today included:    Bed mobility:  Pt completed sup to sit with Bambi at trunk. Scooting:  Pt completed scooting CGA-Bambi to EOB. Seated balance:  Pt retropulsive with Bambi and progressed to SBA. STS:  Pt completed from EOB Bambi to FWW.     SPT:  Pt completed from EOB and to recliner CGA with FWW.        Education: Role of OT, OT POC, safety, benefits of EOB/OOB activity, rationale for treatment  Safety Measures: Gait belt used for safety of pt and therapist, Left in recliner, Alarm in place, call light and phone within reach, lines managed, needs met     Assessment / Impression:      Patient's tolerance of treatment: Good   Adverse Reaction: none   Significant change in status and impact:  improved cognition however cont to demo slight impairments- will cont to monitor   Barriers to improvement: strength, endurance, balance, cognition     Plan for Next Session:    Continue OT POC    Time in:  1028  Time out:  1042  Timed treatment minutes:  14  Total treatment time:  14    Electronically signed by:    GERARDO Celis/L  License: [id number]  04/1/6639, 1:45 PM

## 2023-10-02 NOTE — PROGRESS NOTES
demonstrate no acute abnormality. SINUSES: The visualized paranasal sinuses and mastoid air cells demonstrate no acute abnormality. SOFT TISSUES/SKULL: No acute abnormality of the visualized skull. Small soft tissue contusion/cephalhematoma of the left frontal scalp. CT CERVICAL SPINE: BONES/ALIGNMENT: There is no acute fracture or traumatic malalignment. DEGENERATIVE CHANGES: Multilevel mild spondylosis and mild-to-moderate facet arthropathy of the cervical spine. Solid osseous fusion of the posterior elements at C2-C3. Partial osseous fusion of the C2-C3 vertebral bodies. SOFT TISSUES: There is no prevertebral soft tissue swelling. Partially imaged lung apices demonstrate interlobular septal thickening and ground-glass opacity favored to reflect pulmonary edema. Endotracheal tube in place. CT head: 1. No acute intracranial process identified. 2. Left frontal cephalhematoma. CT cervical spine: 1. No acute fracture of the cervical spine identified. 2. Findings suggesting pulmonary edema at the partially imaged lung apices. CT CERVICAL SPINE WO CONTRAST    Result Date: 9/27/2023  EXAMINATION: CT OF THE HEAD WITHOUT CONTRAST; CT OF THE CERVICAL SPINE WITHOUT CONTRAST 9/27/2023 7:26 pm TECHNIQUE: CT of the head was performed without the administration of intravenous contrast. Automated exposure control, iterative reconstruction, and/or weight based adjustment of the mA/kV was utilized to reduce the radiation dose to as low as reasonably achievable.; CT of the cervical spine was performed without the administration of intravenous contrast. Multiplanar reformatted images are provided for review. Automated exposure control, iterative reconstruction, and/or weight based adjustment of the mA/kV was utilized to reduce the radiation dose to as low as reasonably achievable. COMPARISON: None.  HISTORY: ORDERING SYSTEM PROVIDED HISTORY: [facility] TECHNOLOGIST PROVIDED HISTORY: Has a \"code stroke\" or \"stroke alert\" been

## 2023-10-02 NOTE — PROGRESS NOTES
[facility] SPEECH/LANGUAGE PATHOLOGY  DAILY PROGRESS NOTE  Bong Galvez  10/2/2023  [id number]  Altered mental status [R41.82]  Allergies   Allergen Reactions    Inderal [Propranolol Hcl]     Statins      Muscle aches    Tramadol Other (See Comments)     headache         Pt was seen this date for dysphagia treatment. IMPRESSION AND RECOMMENDATIONS: Bong Galvez was seen for dysphagia follow-up. She was seated upright in bed, alert, cooperative. Vocal quality improved from previous visit. She denies concerns with current diet textures and reports that she prefers softer textures. She accepted PO trials of thin liquids via straw and soft/bite-sized foods from her breakfast tray. Oral stage WFL with intact labial seal, mastication, oral clearance. Pharyngeal stage appears WFL without s/s aspiration. Noted overall improved coordination of respiration with mastication/swallowing and phonation. Recommend continued thin liquids, soft and bite-sized diet per pt preference. No further acute SLP needs identified. GOALS (current status in bold):  Short-term Goals  Timeframe for Short-term Goals: LOS  Goal 1: Pt will tolerate soft and bite-sized diet/thin liquids with adequate oral manipulation/clearance and no s/s aspiration. Met  Goal 2: Pt will tolerate PO trials of advanced textures with adequate oral manipulation/clearance and no s/s aspiration. DNT, pt prefers soft diet at this time  Goal 3: Pt/caregivers will indicate understanding of education/recommendations.  Met          EDUCATION: recommendations/POC    PAIN RATING (0-10 Scale): 0/denies  Time in/Time out: SLP Individual Minutes  Time In: Mia Butler  Time Out: 2812  Minutes: 15    Visit number: [facility], SLP  10/2/2023  10:40 AM

## 2023-10-02 NOTE — PROGRESS NOTES
SINUSES: The visualized paranasal sinuses and mastoid air cells demonstrate no acute abnormality. SOFT TISSUES/SKULL: No acute abnormality of the visualized skull. Small soft tissue contusion/cephalhematoma of the left frontal scalp. CT CERVICAL SPINE: BONES/ALIGNMENT: There is no acute fracture or traumatic malalignment. DEGENERATIVE CHANGES: Multilevel mild spondylosis and mild-to-moderate facet arthropathy of the cervical spine. Solid osseous fusion of the posterior elements at C2-C3. Partial osseous fusion of the C2-C3 vertebral bodies. SOFT TISSUES: There is no prevertebral soft tissue swelling. Partially imaged lung apices demonstrate interlobular septal thickening and ground-glass opacity favored to reflect pulmonary edema. Endotracheal tube in place. CT head: 1. No acute intracranial process identified. 2. Left frontal cephalhematoma. CT cervical spine: 1. No acute fracture of the cervical spine identified. 2. Findings suggesting pulmonary edema at the partially imaged lung apices. XR CHEST PORTABLE    Result Date: 9/27/2023  EXAMINATION: ONE XRAY VIEW OF THE CHEST 9/27/2023 7:15 pm COMPARISON: 07/10/2017 HISTORY: ORDERING SYSTEM PROVIDED HISTORY: intubation TECHNOLOGIST PROVIDED HISTORY: Reason for exam:->intubation Reason for Exam: intubation Additional signs and symptoms: na Relevant Medical/Surgical History: COPD FINDINGS: Endotracheal tube tip 3 cm above the annie. Hazy opacities throughout both lungs. Mild cardiomegaly with vascular congestion and Kerley B lines. No pleural effusion or pneumothorax. No acute osseous abnormality. Mild cardiomegaly and interstitial edema. Hazy opacities throughout both lungs could represent alveolar edema with superimposed infection not excluded.            Electronically signed by Noy Kline PA-C on 10/2/2023 at 2:35 PM

## 2023-10-03 LAB
ANION GAP SERPL CALCULATED.3IONS-SCNC: 9 MMOL/L (ref 4–16)
BASOPHILS ABSOLUTE: 0 K/CU MM
BASOPHILS RELATIVE PERCENT: 0.5 % (ref 0–1)
BUN SERPL-MCNC: 12 MG/DL (ref 6–23)
CALCIUM SERPL-MCNC: 9.3 MG/DL (ref 8.3–10.6)
CHLORIDE BLD-SCNC: 93 MMOL/L (ref 99–110)
CO2: 24 MMOL/L (ref 21–32)
CREAT SERPL-MCNC: 0.7 MG/DL (ref 0.6–1.1)
DIFFERENTIAL TYPE: ABNORMAL
EOSINOPHILS ABSOLUTE: 0.6 K/CU MM
EOSINOPHILS RELATIVE PERCENT: 7 % (ref 0–3)
GFR SERPL CREATININE-BSD FRML MDRD: >60 ML/MIN/1.73M2
GLUCOSE SERPL-MCNC: 120 MG/DL (ref 70–99)
HCT VFR BLD CALC: 38.9 % (ref 37–47)
HEMOGLOBIN: 13.2 GM/DL (ref 12.5–16)
IMMATURE NEUTROPHIL %: 0.5 % (ref 0–0.43)
LYMPHOCYTES ABSOLUTE: 1.6 K/CU MM
LYMPHOCYTES RELATIVE PERCENT: 18.2 % (ref 24–44)
MCH RBC QN AUTO: 29.8 PG (ref 27–31)
MCHC RBC AUTO-ENTMCNC: 33.9 % (ref 32–36)
MCV RBC AUTO: 87.8 FL (ref 78–100)
MONOCYTES ABSOLUTE: 1.3 K/CU MM
MONOCYTES RELATIVE PERCENT: 14.5 % (ref 0–4)
NUCLEATED RBC %: 0 %
PDW BLD-RTO: 12.3 % (ref 11.7–14.9)
PLATELET # BLD: 265 K/CU MM (ref 140–440)
PMV BLD AUTO: 9.9 FL (ref 7.5–11.1)
POTASSIUM SERPL-SCNC: 4.4 MMOL/L (ref 3.5–5.1)
RBC # BLD: 4.43 M/CU MM (ref 4.2–5.4)
SEGMENTED NEUTROPHILS ABSOLUTE COUNT: 5.2 K/CU MM
SEGMENTED NEUTROPHILS RELATIVE PERCENT: 59.3 % (ref 36–66)
SODIUM BLD-SCNC: 126 MMOL/L (ref 135–145)
TOTAL IMMATURE NEUTOROPHIL: 0.04 K/CU MM
TOTAL NUCLEATED RBC: 0 K/CU MM
WBC # BLD: 8.8 K/CU MM (ref 4–10.5)

## 2023-10-03 PROCEDURE — 2000000000 HC ICU R&B

## 2023-10-03 PROCEDURE — 6370000000 HC RX 637 (ALT 250 FOR IP): Performed by: NURSE PRACTITIONER

## 2023-10-03 PROCEDURE — 6360000002 HC RX W HCPCS: Performed by: NURSE PRACTITIONER

## 2023-10-03 PROCEDURE — 2580000003 HC RX 258: Performed by: STUDENT IN AN ORGANIZED HEALTH CARE EDUCATION/TRAINING PROGRAM

## 2023-10-03 PROCEDURE — 80048 BASIC METABOLIC PNL TOTAL CA: CPT

## 2023-10-03 PROCEDURE — 6370000000 HC RX 637 (ALT 250 FOR IP)

## 2023-10-03 PROCEDURE — 2700000000 HC OXYGEN THERAPY PER DAY

## 2023-10-03 PROCEDURE — 85025 COMPLETE CBC W/AUTO DIFF WBC: CPT

## 2023-10-03 PROCEDURE — 6370000000 HC RX 637 (ALT 250 FOR IP): Performed by: STUDENT IN AN ORGANIZED HEALTH CARE EDUCATION/TRAINING PROGRAM

## 2023-10-03 PROCEDURE — 6370000000 HC RX 637 (ALT 250 FOR IP): Performed by: PHYSICIAN ASSISTANT

## 2023-10-03 PROCEDURE — 94761 N-INVAS EAR/PLS OXIMETRY MLT: CPT

## 2023-10-03 PROCEDURE — 94640 AIRWAY INHALATION TREATMENT: CPT

## 2023-10-03 PROCEDURE — 6360000002 HC RX W HCPCS: Performed by: STUDENT IN AN ORGANIZED HEALTH CARE EDUCATION/TRAINING PROGRAM

## 2023-10-03 RX ORDER — IPRATROPIUM BROMIDE AND ALBUTEROL SULFATE 2.5; .5 MG/3ML; MG/3ML
1 SOLUTION RESPIRATORY (INHALATION)
Status: DISCONTINUED | OUTPATIENT
Start: 2023-10-03 | End: 2023-10-04 | Stop reason: HOSPADM

## 2023-10-03 RX ORDER — FLUTICASONE PROPIONATE 50 MCG
1 SPRAY, SUSPENSION (ML) NASAL 2 TIMES DAILY PRN
Status: DISCONTINUED | OUTPATIENT
Start: 2023-10-03 | End: 2023-10-04 | Stop reason: HOSPADM

## 2023-10-03 RX ADMIN — FLUTICASONE PROPIONATE 1 SPRAY: 50 SPRAY, METERED NASAL at 16:58

## 2023-10-03 RX ADMIN — Medication 1 TABLET: at 20:46

## 2023-10-03 RX ADMIN — IPRATROPIUM BROMIDE AND ALBUTEROL SULFATE 1 DOSE: 2.5; .5 SOLUTION RESPIRATORY (INHALATION) at 11:30

## 2023-10-03 RX ADMIN — SERTRALINE HYDROCHLORIDE 50 MG: 50 TABLET ORAL at 09:33

## 2023-10-03 RX ADMIN — VALSARTAN AND HYDROCHLOROTHIAZIDE 1 TABLET: 320; 12.5 TABLET, FILM COATED ORAL at 09:34

## 2023-10-03 RX ADMIN — LORAZEPAM 0.25 MG: 0.5 TABLET ORAL at 09:33

## 2023-10-03 RX ADMIN — LORAZEPAM 0.25 MG: 0.5 TABLET ORAL at 20:46

## 2023-10-03 RX ADMIN — ATENOLOL 12.5 MG: 25 TABLET ORAL at 09:33

## 2023-10-03 RX ADMIN — IPRATROPIUM BROMIDE AND ALBUTEROL SULFATE 1 DOSE: 2.5; .5 SOLUTION RESPIRATORY (INHALATION) at 08:03

## 2023-10-03 RX ADMIN — SODIUM CHLORIDE, PRESERVATIVE FREE 10 ML: 5 INJECTION INTRAVENOUS at 20:47

## 2023-10-03 RX ADMIN — Medication 1 TABLET: at 09:33

## 2023-10-03 RX ADMIN — HYDRALAZINE HYDROCHLORIDE 10 MG: 20 INJECTION INTRAMUSCULAR; INTRAVENOUS at 02:10

## 2023-10-03 RX ADMIN — QUETIAPINE FUMARATE 25 MG: 25 TABLET ORAL at 20:46

## 2023-10-03 RX ADMIN — IPRATROPIUM BROMIDE AND ALBUTEROL SULFATE 1 DOSE: 2.5; .5 SOLUTION RESPIRATORY (INHALATION) at 15:41

## 2023-10-03 RX ADMIN — PANTOPRAZOLE SODIUM 40 MG: 40 TABLET, DELAYED RELEASE ORAL at 09:33

## 2023-10-03 RX ADMIN — IPRATROPIUM BROMIDE AND ALBUTEROL SULFATE 1 DOSE: 2.5; .5 SOLUTION RESPIRATORY (INHALATION) at 19:58

## 2023-10-03 RX ADMIN — ATENOLOL 12.5 MG: 25 TABLET ORAL at 20:46

## 2023-10-03 RX ADMIN — ENOXAPARIN SODIUM 40 MG: 100 INJECTION SUBCUTANEOUS at 09:34

## 2023-10-03 RX ADMIN — SODIUM CHLORIDE, PRESERVATIVE FREE 10 ML: 5 INJECTION INTRAVENOUS at 09:34

## 2023-10-03 RX ADMIN — EZETIMIBE 10 MG: 10 TABLET ORAL at 09:33

## 2023-10-03 RX ADMIN — CEFTRIAXONE SODIUM 2000 MG: 2 INJECTION, POWDER, FOR SOLUTION INTRAMUSCULAR; INTRAVENOUS at 09:42

## 2023-10-03 ASSESSMENT — PAIN SCALES - WONG BAKER
WONGBAKER_NUMERICALRESPONSE: 0

## 2023-10-03 ASSESSMENT — PAIN SCALES - GENERAL
PAINLEVEL_OUTOF10: 0

## 2023-10-03 NOTE — PROGRESS NOTES
Patients  Delwyn Closs states that patient has had two sets of dentures (uppers and lowers) brought to hospital and that one pair is missing. Currently the \"other\" pair of dentures are in patients mouth.  states that yesterday patient had second set but today they are missing. This RN did not notice a second pair of dentures in room yesterday. Upon further discussion, it was asked which pair patient had when she came to the hospital initially came in as patient was intubated upon admission. He states that the other pair were removed when intubated but placed back in once extubated. He then goes on to state she had them in the night of 9/30 when he left but that they were \"missing\" when he came back that morning 10/1. This discrepancy is noted from what  said earlier in conversation that they were only missing since \"last night\" 10/2. Patients  encouraged to look at home for them in case they were taken home on accident. Patient has full set of dentures in mouth at current. Charge RN [employer] notified.

## 2023-10-03 NOTE — PROGRESS NOTES
Dr. Rayray tijerina. Sitter d/c'd and Virtual  () set up in room. Discussed with patient and family. Discussed good sleep hygiene, including staying awake during the day and sleeping at night. Blinds open and daily awakening encouraged.

## 2023-10-03 NOTE — PROGRESS NOTES
reconstruction, and/or weight based adjustment of the mA/kV was utilized to reduce the radiation dose to as low as reasonably achievable.; CT of the cervical spine was performed without the administration of intravenous contrast. Multiplanar reformatted images are provided for review. Automated exposure control, iterative reconstruction, and/or weight based adjustment of the mA/kV was utilized to reduce the radiation dose to as low as reasonably achievable. COMPARISON: None. HISTORY: ORDERING SYSTEM PROVIDED HISTORY: ams TECHNOLOGIST PROVIDED HISTORY: Has a \"code stroke\" or \"stroke alert\" been called? ->No Reason for exam:->ams Decision Support Exception - unselect if not a suspected or confirmed emergency medical condition->Emergency Medical Condition (MA) Reason for Exam: ams; ORDERING SYSTEM PROVIDED HISTORY: trauma TECHNOLOGIST PROVIDED HISTORY: Reason for exam:->trauma Decision Support Exception - unselect if not a suspected or confirmed emergency medical condition->Emergency Medical Condition (MA) Reason for Exam: trauma FINDINGS: CT HEAD: BRAIN/VENTRICLES: There is no acute intracranial hemorrhage, mass effect or midline shift. No abnormal extra-axial fluid collection. The gray-white differentiation is maintained without evidence of an acute infarct. There is no evidence of hydrocephalus. There is mild age appropriate global cerebral atrophy. ORBITS: The visualized portion of the orbits demonstrate no acute abnormality. SINUSES: The visualized paranasal sinuses and mastoid air cells demonstrate no acute abnormality. SOFT TISSUES/SKULL: No acute abnormality of the visualized skull. Small soft tissue contusion/cephalhematoma of the left frontal scalp. CT CERVICAL SPINE: BONES/ALIGNMENT: There is no acute fracture or traumatic malalignment. DEGENERATIVE CHANGES: Multilevel mild spondylosis and mild-to-moderate facet arthropathy of the cervical spine. Solid osseous fusion of the posterior elements at C2-C3. Partial osseous fusion of the C2-C3 vertebral bodies. SOFT TISSUES: There is no prevertebral soft tissue swelling. Partially imaged lung apices demonstrate interlobular septal thickening and ground-glass opacity favored to reflect pulmonary edema. Endotracheal tube in place. CT head: 1. No acute intracranial process identified. 2. Left frontal cephalhematoma. CT cervical spine: 1. No acute fracture of the cervical spine identified. 2. Findings suggesting pulmonary edema at the partially imaged lung apices. XR CHEST PORTABLE    Result Date: 9/27/2023  EXAMINATION: ONE XRAY VIEW OF THE CHEST 9/27/2023 7:15 pm COMPARISON: 07/10/2017 HISTORY: ORDERING SYSTEM PROVIDED HISTORY: intubation TECHNOLOGIST PROVIDED HISTORY: Reason for exam:->intubation Reason for Exam: intubation Additional signs and symptoms: na Relevant Medical/Surgical History: COPD FINDINGS: Endotracheal tube tip 3 cm above the annie. Hazy opacities throughout both lungs. Mild cardiomegaly with vascular congestion and Kerley B lines. No pleural effusion or pneumothorax. No acute osseous abnormality. Mild cardiomegaly and interstitial edema. Hazy opacities throughout both lungs could represent alveolar edema with superimposed infection not excluded.            Electronically signed by Heber Merlin, PA-C on 10/3/2023 at 3:55 PM

## 2023-10-03 NOTE — PROGRESS NOTES
(MA) Reason for Exam: ams; ORDERING SYSTEM PROVIDED HISTORY: trauma TECHNOLOGIST PROVIDED HISTORY: Reason for exam:->trauma Decision Support Exception - unselect if not a suspected or confirmed emergency medical condition->Emergency Medical Condition (MA) Reason for Exam: trauma FINDINGS: CT HEAD: BRAIN/VENTRICLES: There is no acute intracranial hemorrhage, mass effect or midline shift. No abnormal extra-axial fluid collection. The gray-white differentiation is maintained without evidence of an acute infarct. There is no evidence of hydrocephalus. There is mild age appropriate global cerebral atrophy. ORBITS: The visualized portion of the orbits demonstrate no acute abnormality. SINUSES: The visualized paranasal sinuses and mastoid air cells demonstrate no acute abnormality. SOFT TISSUES/SKULL: No acute abnormality of the visualized skull. Small soft tissue contusion/cephalhematoma of the left frontal scalp. CT CERVICAL SPINE: BONES/ALIGNMENT: There is no acute fracture or traumatic malalignment. DEGENERATIVE CHANGES: Multilevel mild spondylosis and mild-to-moderate facet arthropathy of the cervical spine. Solid osseous fusion of the posterior elements at C2-C3. Partial osseous fusion of the C2-C3 vertebral bodies. SOFT TISSUES: There is no prevertebral soft tissue swelling. Partially imaged lung apices demonstrate interlobular septal thickening and ground-glass opacity favored to reflect pulmonary edema. Endotracheal tube in place. CT head: 1. No acute intracranial process identified. 2. Left frontal cephalhematoma. CT cervical spine: 1. No acute fracture of the cervical spine identified. 2. Findings suggesting pulmonary edema at the partially imaged lung apices.      XR CHEST PORTABLE    Result Date: 9/27/2023  EXAMINATION: ONE XRAY VIEW OF THE CHEST 9/27/2023 7:15 pm COMPARISON: 07/10/2017 HISTORY: ORDERING SYSTEM PROVIDED HISTORY: intubation TECHNOLOGIST PROVIDED HISTORY: Reason for exam:->intubation

## 2023-10-04 ENCOUNTER — HOSPITAL ENCOUNTER (INPATIENT)
Age: 83
DRG: 947 | End: 2023-10-04
Attending: PHYSICAL MEDICINE & REHABILITATION | Admitting: PHYSICAL MEDICINE & REHABILITATION
Payer: MEDICARE

## 2023-10-04 VITALS
TEMPERATURE: 98.3 F | RESPIRATION RATE: 17 BRPM | DIASTOLIC BLOOD PRESSURE: 61 MMHG | HEIGHT: 63 IN | BODY MASS INDEX: 32.34 KG/M2 | OXYGEN SATURATION: 98 % | SYSTOLIC BLOOD PRESSURE: 108 MMHG | WEIGHT: 182.54 LBS | HEART RATE: 73 BPM

## 2023-10-04 DIAGNOSIS — F41.8 DEPRESSION WITH ANXIETY: Primary | ICD-10-CM

## 2023-10-04 PROBLEM — G93.41 METABOLIC ENCEPHALOPATHY: Status: ACTIVE | Noted: 2023-10-04

## 2023-10-04 LAB
ANION GAP SERPL CALCULATED.3IONS-SCNC: 11 MMOL/L (ref 4–16)
BASOPHILS ABSOLUTE: 0 K/CU MM
BASOPHILS RELATIVE PERCENT: 0.5 % (ref 0–1)
BUN SERPL-MCNC: 12 MG/DL (ref 6–23)
CALCIUM SERPL-MCNC: 9.3 MG/DL (ref 8.3–10.6)
CHLORIDE BLD-SCNC: 96 MMOL/L (ref 99–110)
CO2: 23 MMOL/L (ref 21–32)
CREAT SERPL-MCNC: 0.7 MG/DL (ref 0.6–1.1)
DIFFERENTIAL TYPE: ABNORMAL
EOSINOPHILS ABSOLUTE: 0.6 K/CU MM
EOSINOPHILS RELATIVE PERCENT: 7.4 % (ref 0–3)
GFR SERPL CREATININE-BSD FRML MDRD: >60 ML/MIN/1.73M2
GLUCOSE SERPL-MCNC: 103 MG/DL (ref 70–99)
HCT VFR BLD CALC: 35.9 % (ref 37–47)
HEMOGLOBIN: 12 GM/DL (ref 12.5–16)
IMMATURE NEUTROPHIL %: 0.5 % (ref 0–0.43)
LYMPHOCYTES ABSOLUTE: 1.4 K/CU MM
LYMPHOCYTES RELATIVE PERCENT: 18 % (ref 24–44)
MCH RBC QN AUTO: 29.1 PG (ref 27–31)
MCHC RBC AUTO-ENTMCNC: 33.4 % (ref 32–36)
MCV RBC AUTO: 86.9 FL (ref 78–100)
MONOCYTES ABSOLUTE: 1.2 K/CU MM
MONOCYTES RELATIVE PERCENT: 14.8 % (ref 0–4)
NUCLEATED RBC %: 0 %
PDW BLD-RTO: 12.1 % (ref 11.7–14.9)
PLATELET # BLD: 239 K/CU MM (ref 140–440)
PMV BLD AUTO: 9.8 FL (ref 7.5–11.1)
POTASSIUM SERPL-SCNC: 4.4 MMOL/L (ref 3.5–5.1)
RBC # BLD: 4.13 M/CU MM (ref 4.2–5.4)
SEGMENTED NEUTROPHILS ABSOLUTE COUNT: 4.6 K/CU MM
SEGMENTED NEUTROPHILS RELATIVE PERCENT: 58.8 % (ref 36–66)
SODIUM BLD-SCNC: 130 MMOL/L (ref 135–145)
TOTAL IMMATURE NEUTOROPHIL: 0.04 K/CU MM
TOTAL NUCLEATED RBC: 0 K/CU MM
WBC # BLD: 7.9 K/CU MM (ref 4–10.5)

## 2023-10-04 PROCEDURE — 6370000000 HC RX 637 (ALT 250 FOR IP): Performed by: NURSE PRACTITIONER

## 2023-10-04 PROCEDURE — 2580000003 HC RX 258: Performed by: STUDENT IN AN ORGANIZED HEALTH CARE EDUCATION/TRAINING PROGRAM

## 2023-10-04 PROCEDURE — 99223 1ST HOSP IP/OBS HIGH 75: CPT | Performed by: PHYSICAL MEDICINE & REHABILITATION

## 2023-10-04 PROCEDURE — 6370000000 HC RX 637 (ALT 250 FOR IP): Performed by: INTERNAL MEDICINE

## 2023-10-04 PROCEDURE — 6360000002 HC RX W HCPCS: Performed by: STUDENT IN AN ORGANIZED HEALTH CARE EDUCATION/TRAINING PROGRAM

## 2023-10-04 PROCEDURE — 97110 THERAPEUTIC EXERCISES: CPT

## 2023-10-04 PROCEDURE — 94761 N-INVAS EAR/PLS OXIMETRY MLT: CPT

## 2023-10-04 PROCEDURE — 2580000003 HC RX 258: Performed by: INTERNAL MEDICINE

## 2023-10-04 PROCEDURE — 6370000000 HC RX 637 (ALT 250 FOR IP): Performed by: PHYSICIAN ASSISTANT

## 2023-10-04 PROCEDURE — 6370000000 HC RX 637 (ALT 250 FOR IP): Performed by: STUDENT IN AN ORGANIZED HEALTH CARE EDUCATION/TRAINING PROGRAM

## 2023-10-04 PROCEDURE — 97535 SELF CARE MNGMENT TRAINING: CPT

## 2023-10-04 PROCEDURE — 1280000000 HC REHAB R&B

## 2023-10-04 PROCEDURE — 80048 BASIC METABOLIC PNL TOTAL CA: CPT

## 2023-10-04 PROCEDURE — 85025 COMPLETE CBC W/AUTO DIFF WBC: CPT

## 2023-10-04 PROCEDURE — 97530 THERAPEUTIC ACTIVITIES: CPT

## 2023-10-04 PROCEDURE — 94640 AIRWAY INHALATION TREATMENT: CPT

## 2023-10-04 PROCEDURE — 6370000000 HC RX 637 (ALT 250 FOR IP)

## 2023-10-04 PROCEDURE — 97116 GAIT TRAINING THERAPY: CPT

## 2023-10-04 RX ORDER — FLUTICASONE PROPIONATE 50 MCG
1 SPRAY, SUSPENSION (ML) NASAL 2 TIMES DAILY PRN
Status: DISCONTINUED | OUTPATIENT
Start: 2023-10-04 | End: 2023-10-12 | Stop reason: HOSPADM

## 2023-10-04 RX ORDER — FLUTICASONE PROPIONATE 50 MCG
1 SPRAY, SUSPENSION (ML) NASAL 2 TIMES DAILY PRN
Status: CANCELLED | OUTPATIENT
Start: 2023-10-04

## 2023-10-04 RX ORDER — LORAZEPAM 0.5 MG/1
0.25 TABLET ORAL 2 TIMES DAILY
Status: COMPLETED | OUTPATIENT
Start: 2023-10-04 | End: 2023-10-09

## 2023-10-04 RX ORDER — ONDANSETRON 2 MG/ML
4 INJECTION INTRAMUSCULAR; INTRAVENOUS EVERY 6 HOURS PRN
Status: DISCONTINUED | OUTPATIENT
Start: 2023-10-04 | End: 2023-10-09

## 2023-10-04 RX ORDER — EZETIMIBE 10 MG/1
10 TABLET ORAL DAILY
Status: CANCELLED | OUTPATIENT
Start: 2023-10-05

## 2023-10-04 RX ORDER — ATENOLOL 25 MG/1
12.5 TABLET ORAL 2 TIMES DAILY
Status: CANCELLED | OUTPATIENT
Start: 2023-10-04

## 2023-10-04 RX ORDER — QUETIAPINE FUMARATE 25 MG/1
25 TABLET, FILM COATED ORAL NIGHTLY
Status: DISCONTINUED | OUTPATIENT
Start: 2023-10-04 | End: 2023-10-12 | Stop reason: HOSPADM

## 2023-10-04 RX ORDER — LORAZEPAM 0.5 MG/1
0.25 TABLET ORAL 2 TIMES DAILY
Status: DISCONTINUED | OUTPATIENT
Start: 2023-10-09 | End: 2023-10-12 | Stop reason: HOSPADM

## 2023-10-04 RX ORDER — VALSARTAN AND HYDROCHLOROTHIAZIDE 320; 12.5 MG/1; MG/1
1 TABLET, FILM COATED ORAL DAILY
Status: CANCELLED | OUTPATIENT
Start: 2023-10-05

## 2023-10-04 RX ORDER — QUETIAPINE FUMARATE 25 MG/1
12.5 TABLET, FILM COATED ORAL DAILY PRN
Status: CANCELLED | OUTPATIENT
Start: 2023-10-04

## 2023-10-04 RX ORDER — IPRATROPIUM BROMIDE AND ALBUTEROL SULFATE 2.5; .5 MG/3ML; MG/3ML
1 SOLUTION RESPIRATORY (INHALATION)
Status: CANCELLED | OUTPATIENT
Start: 2023-10-04

## 2023-10-04 RX ORDER — POLYETHYLENE GLYCOL 3350 17 G/17G
17 POWDER, FOR SOLUTION ORAL DAILY PRN
Status: CANCELLED | OUTPATIENT
Start: 2023-10-04

## 2023-10-04 RX ORDER — LORAZEPAM 0.5 MG/1
0.25 TABLET ORAL 2 TIMES DAILY
Status: CANCELLED | OUTPATIENT
Start: 2023-10-09 | End: 2023-10-16

## 2023-10-04 RX ORDER — QUETIAPINE FUMARATE 25 MG/1
25 TABLET, FILM COATED ORAL NIGHTLY
Status: CANCELLED | OUTPATIENT
Start: 2023-10-04

## 2023-10-04 RX ORDER — PANTOPRAZOLE SODIUM 40 MG/1
40 TABLET, DELAYED RELEASE ORAL
Status: DISCONTINUED | OUTPATIENT
Start: 2023-10-05 | End: 2023-10-12 | Stop reason: HOSPADM

## 2023-10-04 RX ORDER — ENOXAPARIN SODIUM 100 MG/ML
40 INJECTION SUBCUTANEOUS DAILY
Status: CANCELLED | OUTPATIENT
Start: 2023-10-05

## 2023-10-04 RX ORDER — SODIUM CHLORIDE 0.9 % (FLUSH) 0.9 %
5-40 SYRINGE (ML) INJECTION 2 TIMES DAILY
Status: CANCELLED | OUTPATIENT
Start: 2023-10-04

## 2023-10-04 RX ORDER — ONDANSETRON 2 MG/ML
4 INJECTION INTRAMUSCULAR; INTRAVENOUS EVERY 6 HOURS PRN
Status: CANCELLED | OUTPATIENT
Start: 2023-10-04

## 2023-10-04 RX ORDER — ACETAMINOPHEN 650 MG/1
650 SUPPOSITORY RECTAL EVERY 6 HOURS PRN
Status: CANCELLED | OUTPATIENT
Start: 2023-10-04

## 2023-10-04 RX ORDER — IPRATROPIUM BROMIDE AND ALBUTEROL SULFATE 2.5; .5 MG/3ML; MG/3ML
1 SOLUTION RESPIRATORY (INHALATION)
Status: DISCONTINUED | OUTPATIENT
Start: 2023-10-04 | End: 2023-10-05

## 2023-10-04 RX ORDER — SODIUM CHLORIDE 9 MG/ML
INJECTION, SOLUTION INTRAVENOUS PRN
Status: CANCELLED | OUTPATIENT
Start: 2023-10-04

## 2023-10-04 RX ORDER — ENOXAPARIN SODIUM 100 MG/ML
40 INJECTION SUBCUTANEOUS DAILY
Status: DISCONTINUED | OUTPATIENT
Start: 2023-10-05 | End: 2023-10-09

## 2023-10-04 RX ORDER — LORAZEPAM 0.5 MG/1
0.25 TABLET ORAL EVERY OTHER DAY
Status: DISCONTINUED | OUTPATIENT
Start: 2023-10-17 | End: 2023-10-12 | Stop reason: HOSPADM

## 2023-10-04 RX ORDER — LORAZEPAM 0.5 MG/1
0.25 TABLET ORAL 2 TIMES DAILY
Status: CANCELLED | OUTPATIENT
Start: 2023-10-04 | End: 2023-10-09

## 2023-10-04 RX ORDER — VALSARTAN AND HYDROCHLOROTHIAZIDE 320; 12.5 MG/1; MG/1
1 TABLET, FILM COATED ORAL DAILY
Status: DISCONTINUED | OUTPATIENT
Start: 2023-10-05 | End: 2023-10-12 | Stop reason: HOSPADM

## 2023-10-04 RX ORDER — SODIUM CHLORIDE 0.9 % (FLUSH) 0.9 %
5-40 SYRINGE (ML) INJECTION PRN
Status: CANCELLED | OUTPATIENT
Start: 2023-10-04

## 2023-10-04 RX ORDER — SODIUM CHLORIDE 0.9 % (FLUSH) 0.9 %
5-40 SYRINGE (ML) INJECTION 2 TIMES DAILY
Status: DISCONTINUED | OUTPATIENT
Start: 2023-10-04 | End: 2023-10-09

## 2023-10-04 RX ORDER — ATENOLOL 25 MG/1
12.5 TABLET ORAL 2 TIMES DAILY
Status: DISCONTINUED | OUTPATIENT
Start: 2023-10-04 | End: 2023-10-08

## 2023-10-04 RX ORDER — POLYETHYLENE GLYCOL 3350 17 G/17G
17 POWDER, FOR SOLUTION ORAL DAILY PRN
Status: DISCONTINUED | OUTPATIENT
Start: 2023-10-04 | End: 2023-10-04

## 2023-10-04 RX ORDER — ONDANSETRON 4 MG/1
4 TABLET, ORALLY DISINTEGRATING ORAL EVERY 8 HOURS PRN
Status: DISCONTINUED | OUTPATIENT
Start: 2023-10-04 | End: 2023-10-12 | Stop reason: HOSPADM

## 2023-10-04 RX ORDER — ACETAMINOPHEN 325 MG/1
650 TABLET ORAL EVERY 6 HOURS PRN
Status: CANCELLED | OUTPATIENT
Start: 2023-10-04

## 2023-10-04 RX ORDER — ONDANSETRON 4 MG/1
4 TABLET, ORALLY DISINTEGRATING ORAL EVERY 8 HOURS PRN
Status: CANCELLED | OUTPATIENT
Start: 2023-10-04

## 2023-10-04 RX ORDER — SODIUM CHLORIDE 9 MG/ML
INJECTION, SOLUTION INTRAVENOUS PRN
Status: DISCONTINUED | OUTPATIENT
Start: 2023-10-04 | End: 2023-10-12

## 2023-10-04 RX ORDER — POLYETHYLENE GLYCOL 3350 17 G/17G
17 POWDER, FOR SOLUTION ORAL DAILY PRN
Status: DISCONTINUED | OUTPATIENT
Start: 2023-10-04 | End: 2023-10-12 | Stop reason: HOSPADM

## 2023-10-04 RX ORDER — QUETIAPINE FUMARATE 25 MG/1
12.5 TABLET, FILM COATED ORAL DAILY PRN
Status: DISCONTINUED | OUTPATIENT
Start: 2023-10-04 | End: 2023-10-12

## 2023-10-04 RX ORDER — ACETAMINOPHEN 650 MG/1
650 SUPPOSITORY RECTAL EVERY 6 HOURS PRN
Status: DISCONTINUED | OUTPATIENT
Start: 2023-10-04 | End: 2023-10-04

## 2023-10-04 RX ORDER — EZETIMIBE 10 MG/1
10 TABLET ORAL DAILY
Status: DISCONTINUED | OUTPATIENT
Start: 2023-10-05 | End: 2023-10-12 | Stop reason: HOSPADM

## 2023-10-04 RX ORDER — SODIUM CHLORIDE 0.9 % (FLUSH) 0.9 %
5-40 SYRINGE (ML) INJECTION PRN
Status: DISCONTINUED | OUTPATIENT
Start: 2023-10-04 | End: 2023-10-09

## 2023-10-04 RX ORDER — LORAZEPAM 0.5 MG/1
0.25 TABLET ORAL EVERY OTHER DAY
Status: CANCELLED | OUTPATIENT
Start: 2023-10-16 | End: 2023-10-24

## 2023-10-04 RX ORDER — ACETAMINOPHEN 325 MG/1
650 TABLET ORAL EVERY 6 HOURS PRN
Status: DISCONTINUED | OUTPATIENT
Start: 2023-10-04 | End: 2023-10-12 | Stop reason: HOSPADM

## 2023-10-04 RX ORDER — PANTOPRAZOLE SODIUM 40 MG/1
40 TABLET, DELAYED RELEASE ORAL
Status: CANCELLED | OUTPATIENT
Start: 2023-10-05

## 2023-10-04 RX ADMIN — SERTRALINE HYDROCHLORIDE 50 MG: 50 TABLET ORAL at 10:12

## 2023-10-04 RX ADMIN — IPRATROPIUM BROMIDE AND ALBUTEROL SULFATE 1 DOSE: 2.5; .5 SOLUTION RESPIRATORY (INHALATION) at 12:19

## 2023-10-04 RX ADMIN — Medication 1 TABLET: at 21:11

## 2023-10-04 RX ADMIN — ATENOLOL 12.5 MG: 25 TABLET ORAL at 21:10

## 2023-10-04 RX ADMIN — IPRATROPIUM BROMIDE AND ALBUTEROL SULFATE 1 DOSE: 2.5; .5 SOLUTION RESPIRATORY (INHALATION) at 08:38

## 2023-10-04 RX ADMIN — SODIUM CHLORIDE: 900 INJECTION INTRAVENOUS at 09:56

## 2023-10-04 RX ADMIN — QUETIAPINE FUMARATE 25 MG: 25 TABLET ORAL at 21:10

## 2023-10-04 RX ADMIN — EZETIMIBE 10 MG: 10 TABLET ORAL at 10:12

## 2023-10-04 RX ADMIN — ENOXAPARIN SODIUM 40 MG: 100 INJECTION SUBCUTANEOUS at 10:10

## 2023-10-04 RX ADMIN — Medication 1 TABLET: at 10:12

## 2023-10-04 RX ADMIN — LORAZEPAM 0.25 MG: 0.5 TABLET ORAL at 21:10

## 2023-10-04 RX ADMIN — VALSARTAN AND HYDROCHLOROTHIAZIDE 1 TABLET: 320; 12.5 TABLET, FILM COATED ORAL at 11:47

## 2023-10-04 RX ADMIN — CEFTRIAXONE SODIUM 2000 MG: 2 INJECTION, POWDER, FOR SOLUTION INTRAMUSCULAR; INTRAVENOUS at 10:08

## 2023-10-04 RX ADMIN — SODIUM CHLORIDE, PRESERVATIVE FREE 10 ML: 5 INJECTION INTRAVENOUS at 11:41

## 2023-10-04 RX ADMIN — PANTOPRAZOLE SODIUM 40 MG: 40 TABLET, DELAYED RELEASE ORAL at 05:37

## 2023-10-04 RX ADMIN — ATENOLOL 12.5 MG: 25 TABLET ORAL at 10:12

## 2023-10-04 RX ADMIN — SODIUM CHLORIDE, PRESERVATIVE FREE 10 ML: 5 INJECTION INTRAVENOUS at 21:11

## 2023-10-04 RX ADMIN — LORAZEPAM 0.25 MG: 0.5 TABLET ORAL at 10:24

## 2023-10-04 RX ADMIN — IPRATROPIUM BROMIDE AND ALBUTEROL SULFATE 1 DOSE: 2.5; .5 SOLUTION RESPIRATORY (INHALATION) at 20:10

## 2023-10-04 ASSESSMENT — PAIN SCALES - WONG BAKER

## 2023-10-04 ASSESSMENT — PAIN SCALES - GENERAL
PAINLEVEL_OUTOF10: 0

## 2023-10-04 NOTE — PROGRESS NOTES
4 Eyes Skin Assessment     NAME:  Boris Rodriguez  YOB: 1940  MEDICAL RECORD NUMBER:  [id number]    The patient is being assessed for  Admission    I agree that at least one RN has performed a thorough Head to Toe Skin Assessment on the patient. ALL assessment sites listed below have been assessed. Areas assessed by both nurses:    Head, Face, Ears, Shoulders, Back, Chest, Arms, Elbows, Hands, Sacrum. Buttock, Coccyx, Ischium, and Legs. Feet and Heels        Does the Patient have a Wound?  No noted wound(s)       Fran Prevention initiated by RN: No  Wound Care Orders initiated by RN: No    Pressure Injury (Stage 3,4, Unstageable, DTI, NWPT, and Complex wounds) if present, place Wound referral order by RN under : No    New Ostomies, if present place, Ostomy referral order under : No     Nurse 1 eSignature: Electronically signed by Elisha Gonzalez RN on 10/4/23 at 6:49 PM EDT    **SHARE this note so that the co-signing nurse can place an eSignature**    Nurse 2 eSignature: Electronically signed by Daryl Kussmaul, RN on 10/4/23 at 6:59 PM EDT

## 2023-10-04 NOTE — CARE COORDINATION
ARU with bed available today for patient. Notified MD and CM. Awaiting to hear from MD on medical status. Per MD in IDR patient can be discharged to ARU today. Notified ARU team of admission. Patient able to admit once medically stable and after ARU Medical Director and  sign the pre-admission screen (PAS).

## 2023-10-04 NOTE — PLAN OF CARE
Problem: Discharge Planning  Goal: Discharge to home or other facility with appropriate resources  Outcome: Completed     Problem: Safety - Adult  Goal: Free from fall injury  Outcome: Completed     Problem: ABCDS Injury Assessment  Goal: Absence of physical injury  Outcome: Completed     Problem: Skin/Tissue Integrity  Goal: Absence of new skin breakdown  Description: 1. Monitor for areas of redness and/or skin breakdown  2. Assess vascular access sites hourly  3. Every 4-6 hours minimum:  Change oxygen saturation probe site  4. Every 4-6 hours:  If on nasal continuous positive airway pressure, respiratory therapy assess nares and determine need for appliance change or resting period. Outcome: Completed     Problem: Confusion  Goal: Confusion, delirium, dementia, or psychosis is improved or at baseline  Description: INTERVENTIONS:  1. Assess for possible contributors to thought disturbance, including medications, impaired vision or hearing, underlying metabolic abnormalities, dehydration, psychiatric diagnoses, and notify attending LIP  2. [place] high risk fall precautions, as indicated  3. Provide frequent short contacts to provide reality reorientation, refocusing and direction  4. Decrease environmental stimuli, including noise as appropriate  5. Monitor and intervene to maintain adequate nutrition, hydration, elimination, sleep and activity  6. If unable to ensure safety without constant attention obtain sitter and review sitter guidelines with assigned personnel  7.  Initiate Psychosocial CNS and Spiritual Care consult, as indicated  Outcome: Completed     Problem: Pain  Goal: Verbalizes/displays adequate comfort level or baseline comfort level  Outcome: Completed

## 2023-10-04 NOTE — PROGRESS NOTES
ARU Admission Assessment - [facility]      A Complete drug regimen review was completed for this patient this date. [x]  No clinically significant medication issue was identified   []  Yes, a clinically significant medication issue was identified     []  Adverse Drug Event:    []  Allergy:    []  Side Effect:    []  Ineffective Therapy:    []  Drug interaction:     []  Duplicate Therapy:    []  Untreated Indication:    []  Non-adherence:    []  Other:  Nursing contacted the physician:       Date:                Time:    Actions recommended by physician were completed:   Date:                 Time:  Action(s) Taken:             []  New Physician Order Received    []  Issue Noted by Physician; However No Action Required    []  Other:        Ethnicity  \"Are you of , /a, or Wolof origin? \"  Check all that apply:  [x] A. No, not of , /a, or [place] Origin  [] B.  Yes, [place] American, Chicano/a  [] C.  Yes, [address] [place] [address]  [] D.  Yes, [place]  [] E.  Yes, another , , or Wolof origin  [] X. Patient unable to respond    Race  \"What is your race? \"  Check all that apply:  [x] A. White  [] B. Black or   [] C. American [place] or [place] Native  [] D.  [place]  [] E. [place]  [] F. Mauritian  [] G. [place]  [] Mars Ann  [] I. [place]  [] J.  Other   [] K.   [] L. Samoan or [place]agan  [] M. Sammarinese  [] N. Other [facility] [facility]  [] X. Patient unable to respond    Language  A. \"What is your preferred language? \"   English    B. \"Do you need or want an  to communicate with a doctor or health care staff? \"  Check only one:  [x] 0. No  [] 1. Yes  [] 9. Unable to determine    Transportation  \"In the past 6-12 months, has lack of transportation kept you from medical appointments, meetings, work, or from getting things needed for daily living? \"  Check all that apply:  [] A.  Yes, it has kept me from

## 2023-10-04 NOTE — PLAN OF CARE
[x] Monitoring for complications    [x] Fall Precautions/Prevention                         [x] Fluid/Electrolyte/Nutrition Balance   [] Voice Protection                           [x] Medication Management   [x] Respiratory                   [x] Pain Mgmt   [x] Bowel/Bladder Fx        High Risk Drug Classes:  Use and Indication    Is taking: Check if the patient is taking any medications (or has them prescribed) by pharmacological classification, not how it is used, in the following classes  Indication noted:  If column 1 is checked, check if there is an indication noted for all meds in the drug class (must be documented) Is taking  (check all that apply) Indication noted (check all that apply)   Antipsychotic [x] [x]   Anticoagulant [x] [x]   Antibiotic [] []   Opioid [] []   Antiplatelet [] []   Hypoglycemic (including insulin) [] []   None of the above []   * Applies to meds during first three days of admission      Medical Prognosis:               [] Good        [x] Fair     [] Guarded      Total expected IRF days 12                                            Physician anticipated functional outcomes:  FWW rosa elena [facility] OT/PT and supervision.    (NEW) Functional Prognosis: [] Good       [x] Fair     [] Guarded        Rehab Goals:   [] Return to premorbid function of_______________________________.    [] Independent   [] Mod I  [x] Supervision  [] CGA   [] Min A   [] Mod A  Level for ambulation []without assistive device  [x] with assistive device        [] Independent   [] Mod I  [x] Supervision [] CGA   [] Min A   [] Mod A  Level for transfers []without assistive device  [x] with assistive device         [] Independent   [] Mod I  [x] Supervision [] CGA   [] Min A   [] Mod A Level with ADL's []without assistive device   [x] with assistive device     ___________________     Level with cognitive skills requiring [] No assist [x] Supervision  [] Active Assist/Cues     [] Maximize level of mobility and ADL's to decrease burden on caregiver    IPOC brief synthesis of Preadmission Screen, Post-Admission Evaluation, and Therapy Evaluations:  Acute inpatient rehabilitation with occupational and physical therapy 180 minutes 5 out of every 7 days. Will address basic and  advancing mobility with self-care instruction and adaptive equipment training. Caregiver education will be offered. Expected length of stay  prior to a supervised level of function for discharge home with a walker and C OT/PT is 10 to 12 days. Additional recommendation:     Metabolic encephalopathy with gait disturbance: Patient requires daily occupational and physical therapy with speech-language pathology. We will maximize treatment of her respiratory disease while stabilizing her blood pressure and encouraging consistent oral intake. With her impaired coordination, generalized weakness and agitation, she is at risk for fall with injury during standing ADLs and mobility activities. Therefore, she requires adaptive equipment training with strengthening exercises, balance reeducation and endurance building activities. We must provide aggressive pulmonary hygiene measures while encouraging her to maintain euvolemia by mouth. Providing written and verbal instructions when appropriate. Encouraging her family to participate in the rehab process as much as possible. Follow-up with her PCP in 2 weeks. DVT prophylaxis: Continuing Lovenox 40 mg subcu daily. This increases her risk for spontaneous hemorrhage or GI bleeding. Therefore, I must provide her GI prophylaxis with a PPI. Weightbearing activities will be pursued daily. Acute respiratory failure with hypoxia: Continuing DuoNebs every 4 hours while awake, pulmonary hygiene measures and antianxiety therapies. Monitoring O2 saturation at rest and with activities. Hypertension with episodic hypotension: Encouraging oral hydration consistently.   Continuing her Diovan HCTZ and Tenormin with

## 2023-10-04 NOTE — PROGRESS NOTES
Occupational Therapy Treatment Note  Name: Hershel Closs MRN: [id number] :   1940   Date:  10/4/2023   Admission Date: 2023 Room:  -A     Primary Problem:  The primary encounter diagnosis was Acute alteration in mental status. Diagnoses of Acute lactic acidosis and Closed head injury, initial encounter were also pertinent to this visit. Past Medical History:   Diagnosis Date    Anxiety     Cardiomegaly     Cerebral palsy ([address])     Chronic bronchitis ([address])     Compression fracture 2013    age of onset unknown    COPD (chronic obstructive pulmonary disease) ([address])     Depression     Environmental allergies     Essential hypertension, benign     white coat hypertension    Family history of tremor     GERD (gastroesophageal reflux disease)     Hyperlipemia     dx'd     Impaired glucose tolerance     Impaired glucose tolerance     Lichen sclerosus     Mild mitral regurgitation     Mild tricuspid regurgitation     per 2014 echo    Moderate aortic insufficiency     Osteoarthritis of lumbar spine     Osteoarthritis of thoracic spine     Osteoporosis     Sleep apnea     Vertigo        Subjective:  Patient states:  \"I am feeling sleepy\"  Pain: denied   Restrictions: general, fall   No one at bedside    Objective:    Observation:  pt was in bed upon arrival, agreeable to session    Treatment, including education:    Therapeutic Activity Training:   Therapeutic activity training was instructed today. Cues were given for safety, sequence, UE/LE placement, visual cues, and balance. Activities performed today included:    Rolling:   ModA to L with cues and HHA. Bed mobility:  Pt completed sup to sit with Bambi and inc time and cues. Scooting:  Pt completed scooting hips anterior to EOB with CGA. Seated balance:  Pt tolerated EOB well without evidence of lean SBA. STS:  Pt completed from EOB CGA to FWW. Pt completed to [facility] CGA with cues for hand placement.   Pt completed from

## 2023-10-04 NOTE — PROGRESS NOTES
Physical Therapy Treatment Note  Name: Sarina Baltazar MRN: [id number] :   1940   Date:  10/4/2023   Admission Date: 2023 Room:  [address] [place], [place] [address]   Restrictions/Precautions:  general, fall   Communication with other providers:  Pt okay to see for therapy per RN   Subjective:  Patient states:  Agreeable to session. Pain:   Location, Type, Intensity (0/10 to 10/10):  Pt endorses pn at knee but does not give numerical value when questioned or specify which knee. Objective:    Observation:  Pt sitting up in chair upon PTA arrival.   Objective Measures:  Tele, stable   Treatment, including education/measures:    Therapeutic Activity Training:   Therapeutic activity training was instructed today. Cues were given for safety, sequence, UE/LE placement, awareness, and balance. Activities performed today included bed mobility training, sup-sit, sit-stand, SPT. Mobility:  STS: Min A at gait belt, initial Min A in standing as pt collects balance. Gait:   Pt ambulated 8ft in room with RW, CGA, and chair follow for safety. Pt tolerated well but does demo B LE weakness and instability. Pt demos decreased step length, difficulty clearing feet, decreased gait speed, and decreased endurance. Cues provided this date for safety only. Pt tolerated well but does endorse knee pn following bout. Exercises:  Pt performed standing marching, mini-squat, heel raise, and hip abd 1 x 10 ea. LE with initial visual demo and intermittent corrective cues for safety. Pt tolerated well with no c/o pn. Safety:   Pt returned safely to recliner with chair alarm activated, call light in reach, all needs met. Assessment / Impression:    Pt tolerated ambulation well this date.    Patient's tolerance of treatment:  Good   Adverse Reaction: None  Significant change in status and impact:  none  Barriers to improvement:  none  Plan for Next Session:    Plan to increase gait distance with chair follow  Time in:  1035  Time out: 1100  Timed treatment minutes:  25  Total treatment time:  25    Previously filed items:  Social/Functional History  Lives With: Spouse  Type of Home: House  Home Layout: Two level, Able to Live on Main level with bedroom/bathroom  Home Access: Stairs to enter with rails  Entrance Stairs - Number of Steps: 3  Bathroom Shower/Tub: Tub/Shower unit, Shower chair with back  [employer]: Handicap height  Bathroom Equipment: Grab bars in shower  Home Equipment: Al Thomas, [address], [address] Help From: Family  ADL Assistance: [address] Independent  Homemaking Responsibilities: Yes  Ambulation Assistance: Independent ([employer])  Transfer Assistance: Independent  Active : No  Patient's  Info: spouse        Short Term Goals  Time Frame for Short Term Goals: 1 week  Short Term Goal 1: pt to complete all bed mobility mod I  Short Term Goal 2: pt to complete all STS transfers to/from bed, commode, and chair SBA  Short Term Goal 3: pt to ambulate 22' with LRAD CGA    Electronically signed by:    Rebekah Haskins PTA  10/4/2023, 1:21 PM

## 2023-10-04 NOTE — PLAN OF CARE
Problem: Discharge Planning  Goal: Discharge to home or other facility with appropriate resources  Outcome: Progressing     Problem: Safety - Adult  Goal: Free from fall injury  Outcome: Progressing     Problem: ABCDS Injury Assessment  Goal: Absence of physical injury  Outcome: Progressing     Problem: Skin/Tissue Integrity  Goal: Absence of new skin breakdown  Description: 1. Monitor for areas of redness and/or skin breakdown  2. Assess vascular access sites hourly  3. Every 4-6 hours minimum:  Change oxygen saturation probe site  4. Every 4-6 hours:  If on nasal continuous positive airway pressure, respiratory therapy assess nares and determine need for appliance change or resting period. Outcome: Progressing     Problem: Confusion  Goal: Confusion, delirium, dementia, or psychosis is improved or at baseline  Description: INTERVENTIONS:  1. Assess for possible contributors to thought disturbance, including medications, impaired vision or hearing, underlying metabolic abnormalities, dehydration, psychiatric diagnoses, and notify attending LIP  2. [place] high risk fall precautions, as indicated  3. Provide frequent short contacts to provide reality reorientation, refocusing and direction  4. Decrease environmental stimuli, including noise as appropriate  5. Monitor and intervene to maintain adequate nutrition, hydration, elimination, sleep and activity  6. If unable to ensure safety without constant attention obtain sitter and review sitter guidelines with assigned personnel  7.  Initiate Psychosocial CNS and Spiritual Care consult, as indicated  Outcome: Progressing

## 2023-10-04 NOTE — H&P
cone biopsy    CHOLECYSTECTOMY      COLONOSCOPY  6/2012    DILATION AND CURETTAGE OF UTERUS         Current Medications:     Current Facility-Administered Medications:     LORazepam (ATIVAN) tablet 0.25 mg, 0.25 mg, Oral, BID **FOLLOWED BY** [START ON 10/9/2023] LORazepam (ATIVAN) tablet 0.25 mg, 0.25 mg, Oral, BID **FOLLOWED BY** [START ON 10/17/2023] LORazepam (ATIVAN) tablet 0.25 mg, 0.25 mg, Oral, Every Other Day, Tj Gonzalez MD    0.9 % sodium chloride infusion, , IntraVENous, PRN, Tj Gonzalez MD    acetaminophen (TYLENOL) tablet 650 mg, 650 mg, Oral, Q6H PRN **OR** [DISCONTINUED] acetaminophen (TYLENOL) suppository 650 mg, 650 mg, Rectal, Q6H PRN, Tj Gonzalez MD    atenolol (TENORMIN) tablet 12.5 mg, 12.5 mg, Oral, BID, MD Don Spencer Se ON 10/5/2023] enoxaparin (LOVENOX) injection 40 mg, 40 mg, SubCUTAneous, Daily, MD Don Spencer Se ON 10/5/2023] ezetimibe (ZETIA) tablet 10 mg, 10 mg, Oral, Daily, Tj Gonzalez MD    fluticasone (FLONASE) 50 MCG/ACT nasal spray 1 spray, 1 spray, Each Nostril, BID PRN, Tj Gonzalez MD    ipratropium 0.5 mg-albuterol 2.5 mg (DUONEB) nebulizer solution 1 Dose, 1 Dose, Inhalation, Q4H RT, Tj Gonzalez MD    ondansetron (ZOFRAN-ODT) disintegrating tablet 4 mg, 4 mg, Oral, Q8H PRN **OR** ondansetron (ZOFRAN) injection 4 mg, 4 mg, IntraVENous, Q6H PRN, Tj Gonzalez MD    oyster shell calcium w/D 500-5 MG-MCG tablet 1 tablet, 1 tablet, Oral, BID, MD Don Spencer Se ON 10/5/2023] pantoprazole (PROTONIX) tablet 40 mg, 40 mg, Oral, QAM AC, Tj Gonzalez MD    QUEtiapine (SEROQUEL) tablet 12.5 mg, 12.5 mg, Oral, Daily PRN, Tj Gonzalez MD    QUEtiapine (SEROQUEL) tablet 25 mg, 25 mg, Oral, Nightly, MD Don Spencer Se ON 10/5/2023] sertraline (ZOLOFT) tablet 50 mg, 50 mg, Oral, Daily, Tj Gonzalez MD    sodium chloride flush 0.9 % injection 5-40 mL, 5-40 mL, IntraVENous, BID, inpatient rehabilitation at this time. I have compared the patient's current functional status noted during my history and physical with that of the preadmission screen and I have found no significant differences.

## 2023-10-04 NOTE — CARE COORDINATION
Faiza/LAURIE informed this pt that is approved to admit to ARU. They are able to accept pt today if they have the bed capacity. Per  in [address], pt is medically ready.

## 2023-10-04 NOTE — CARE COORDINATION
Patient meets criteria and is approved to come to ARU. Patient able to admit once medically stable and after ARU Medical Director and  sign the pre-admission screen (PAS), pending bed availability. Will notify CM when bed is available.

## 2023-10-05 PROBLEM — F41.8 DEPRESSION WITH ANXIETY: Status: ACTIVE | Noted: 2023-10-05

## 2023-10-05 PROBLEM — J96.01 ACUTE RESPIRATORY FAILURE WITH HYPOXIA (HCC): Status: ACTIVE | Noted: 2023-10-05

## 2023-10-05 PROBLEM — R26.9 GAIT DISTURBANCE: Status: ACTIVE | Noted: 2023-10-05

## 2023-10-05 PROBLEM — G80.4 ATAXIC CEREBRAL PALSY (HCC): Status: ACTIVE | Noted: 2023-10-05

## 2023-10-05 PROCEDURE — 2580000003 HC RX 258: Performed by: INTERNAL MEDICINE

## 2023-10-05 PROCEDURE — 97167 OT EVAL HIGH COMPLEX 60 MIN: CPT

## 2023-10-05 PROCEDURE — 99232 SBSQ HOSP IP/OBS MODERATE 35: CPT | Performed by: PHYSICAL MEDICINE & REHABILITATION

## 2023-10-05 PROCEDURE — 97162 PT EVAL MOD COMPLEX 30 MIN: CPT

## 2023-10-05 PROCEDURE — 6370000000 HC RX 637 (ALT 250 FOR IP): Performed by: PHYSICAL MEDICINE & REHABILITATION

## 2023-10-05 PROCEDURE — 1280000000 HC REHAB R&B

## 2023-10-05 PROCEDURE — 97530 THERAPEUTIC ACTIVITIES: CPT

## 2023-10-05 PROCEDURE — 97535 SELF CARE MNGMENT TRAINING: CPT

## 2023-10-05 PROCEDURE — 94640 AIRWAY INHALATION TREATMENT: CPT

## 2023-10-05 PROCEDURE — 94761 N-INVAS EAR/PLS OXIMETRY MLT: CPT

## 2023-10-05 PROCEDURE — 97116 GAIT TRAINING THERAPY: CPT

## 2023-10-05 PROCEDURE — 6370000000 HC RX 637 (ALT 250 FOR IP): Performed by: INTERNAL MEDICINE

## 2023-10-05 PROCEDURE — 6360000002 HC RX W HCPCS: Performed by: INTERNAL MEDICINE

## 2023-10-05 RX ORDER — IPRATROPIUM BROMIDE AND ALBUTEROL SULFATE 2.5; .5 MG/3ML; MG/3ML
1 SOLUTION RESPIRATORY (INHALATION)
Status: DISCONTINUED | OUTPATIENT
Start: 2023-10-05 | End: 2023-10-08

## 2023-10-05 RX ORDER — IPRATROPIUM BROMIDE AND ALBUTEROL SULFATE 2.5; .5 MG/3ML; MG/3ML
1 SOLUTION RESPIRATORY (INHALATION) EVERY 4 HOURS PRN
Status: DISCONTINUED | OUTPATIENT
Start: 2023-10-05 | End: 2023-10-08

## 2023-10-05 RX ADMIN — PANTOPRAZOLE SODIUM 40 MG: 40 TABLET, DELAYED RELEASE ORAL at 05:37

## 2023-10-05 RX ADMIN — IPRATROPIUM BROMIDE AND ALBUTEROL SULFATE 1 DOSE: 2.5; .5 SOLUTION RESPIRATORY (INHALATION) at 11:34

## 2023-10-05 RX ADMIN — ATENOLOL 12.5 MG: 25 TABLET ORAL at 09:45

## 2023-10-05 RX ADMIN — Medication 1 TABLET: at 21:01

## 2023-10-05 RX ADMIN — ATENOLOL 12.5 MG: 25 TABLET ORAL at 20:59

## 2023-10-05 RX ADMIN — ENOXAPARIN SODIUM 40 MG: 100 INJECTION SUBCUTANEOUS at 09:45

## 2023-10-05 RX ADMIN — IPRATROPIUM BROMIDE AND ALBUTEROL SULFATE 1 DOSE: 2.5; .5 SOLUTION RESPIRATORY (INHALATION) at 08:05

## 2023-10-05 RX ADMIN — EZETIMIBE 10 MG: 10 TABLET ORAL at 09:45

## 2023-10-05 RX ADMIN — SODIUM CHLORIDE, PRESERVATIVE FREE 10 ML: 5 INJECTION INTRAVENOUS at 09:57

## 2023-10-05 RX ADMIN — Medication 1 TABLET: at 09:45

## 2023-10-05 RX ADMIN — LORAZEPAM 0.25 MG: 0.5 TABLET ORAL at 21:01

## 2023-10-05 RX ADMIN — SERTRALINE HYDROCHLORIDE 50 MG: 50 TABLET ORAL at 09:45

## 2023-10-05 RX ADMIN — QUETIAPINE FUMARATE 25 MG: 25 TABLET ORAL at 21:01

## 2023-10-05 RX ADMIN — LORAZEPAM 0.25 MG: 0.5 TABLET ORAL at 09:44

## 2023-10-05 RX ADMIN — IPRATROPIUM BROMIDE AND ALBUTEROL SULFATE 1 DOSE: 2.5; .5 SOLUTION RESPIRATORY (INHALATION) at 06:52

## 2023-10-05 RX ADMIN — VALSARTAN AND HYDROCHLOROTHIAZIDE 1 TABLET: 320; 12.5 TABLET, FILM COATED ORAL at 09:57

## 2023-10-05 ASSESSMENT — PAIN SCALES - GENERAL: PAINLEVEL_OUTOF10: 5

## 2023-10-05 ASSESSMENT — PAIN DESCRIPTION - LOCATION: LOCATION: HEAD

## 2023-10-05 ASSESSMENT — PAIN - FUNCTIONAL ASSESSMENT: PAIN_FUNCTIONAL_ASSESSMENT: ACTIVITIES ARE NOT PREVENTED

## 2023-10-05 ASSESSMENT — PAIN DESCRIPTION - DESCRIPTORS: DESCRIPTORS: ACHING

## 2023-10-05 ASSESSMENT — PAIN DESCRIPTION - PAIN TYPE: TYPE: ACUTE PAIN

## 2023-10-05 ASSESSMENT — PAIN DESCRIPTION - ORIENTATION: ORIENTATION: ANTERIOR

## 2023-10-05 ASSESSMENT — PAIN DESCRIPTION - FREQUENCY: FREQUENCY: INTERMITTENT

## 2023-10-05 NOTE — PROGRESS NOTES
Mobility  Functional - Mobility Device: Rolling Walker  Activity: To/from bathroom  Assist Level: Contact guard assistance  Functional Mobility Comments: Cues to maintain closer proximity to device especially during turns     Cognition:  Cognition  Overall Cognitive Status: Exceptions  Following Commands: Follows one step commands with repetition (requires redirection at times d/t inconsistent attention to task)  Attention Span: Attends with cues to redirect, Difficulty dividing attention  Memory: Decreased short term memory  Safety Judgement: Decreased awareness of need for safety  Problem Solving: Assistance required to correct errors made, Assistance required to identify errors made, Assistance required to generate solutions, Assistance required to implement solutions  Insights: Decreased awareness of deficits  Initiation: Requires cues for some  Sequencing: Requires cues for some  Cognition Comment: scattered/disorganized thinking, with pt bringing up seemingly random comments/statements throughout evaluation requiring redirection to task at hand    Perception:  Perception  Overall Perceptual Status: WFL      Assessment:     Performance deficits / Impairments: Decreased functional mobility , Decreased ADL status, Decreased strength, Decreased safe awareness, Decreased cognition, Decreased endurance, Decreased balance, Decreased high-level IADLs, Decreased fine motor control, Decreased coordination    The patient is a 80year old female admitted onto ARU after hospitalization for being found unresponsive at home on the bathroom floor by her  (he heard the fall) on 09/27. She had been complaining of severe headache and then vomited that evening. Pt became combative and was confused in the EMS unit in route so was given 2.5 mg of midazolam; pt required intubation in ED for acute respiratory failure and concerns for [facility]. However CT head showed L frontal cephalhematoma, but no hemorrhage.  There was also until d/c  Long Term Goal 1: Pt will complete grooming tasks Ind  Long Term Goal 2: Pt will complete total body bathing c S using AE PRN  Long Term Goal 3: Pt will complete UB dressing Ind  Long Term Goal 4: Pt will complete LB dressing Ind  Long Term Goal 5: Pt will doff/don footwear mod I using AE PRN  Additional Goals?: Yes  Long Term Goal 6: Pt will complete toileting mod I  Long Term Goal 7: Pt will complete functional transfers (bed, chair, shower, toilet) c DME PRN and mod I  Long Term Goal 8: Pt will perform therex/therax to facilitate increased strength/endurance/ax tolerance (c emphasis on dynamic standing balance/tolerance > 10 mins, BUE endurance, cognitive retraining) c SBA  Long Term Goal 9: Pt will complete simple homemaking tasks c DME PRN and mod I    Plan:    Pt will be seen at least 60 minutes per day for a minimum of 5 days per week, plus group therapy as appropriate  Current Treatment Recommendations: Strengthening, Balance training, Functional mobility training, Endurance training, Safety education & training, Patient/Caregiver education & training, Equipment evaluation, education, & procurement, Self-Care / ADL, Home management training, Cognitive/Perceptual training, Coordination training    OT Individual Minutes  Time In: 3553  Time Out: 1591  Minutes: 90                Number of Minutes/Billable Intervention      OT Evaluation 25   Therapeutic Exercise    ADL Self-care 65   Neuro Re-Ed    Therapeutic Activity    Group    Other:    TOTAL 90     Electronically signed by:    Colin Lombardi MS, OTR/L  License #[id number]  10/5/2023, 1:52 PM

## 2023-10-05 NOTE — CARE COORDINATION
Case Management Admission Note      Patient:Lubna Schneider      AVE:[birth year]  NZI:[id number]  Rehab Dx/Hx: Encephalopathy [L69.17]  Metabolic encephalopathy [N49.25]    Chief Complaint:   Past Medical History:   Diagnosis Date    Anxiety     Cardiomegaly     Cerebral palsy ([address])     Chronic bronchitis ([address])     Compression fracture 2/2013    age of onset unknown    COPD (chronic obstructive pulmonary disease) ([address])     Depression     Environmental allergies     Essential hypertension, benign 1985    white coat hypertension    Family history of tremor     GERD (gastroesophageal reflux disease)     Hyperlipemia     dx'd 1985    Impaired glucose tolerance     Impaired glucose tolerance     Lichen sclerosus     Mild mitral regurgitation     Mild tricuspid regurgitation     per 7/2014 echo    Moderate aortic insufficiency     Osteoarthritis of lumbar spine     Osteoarthritis of thoracic spine     Osteoporosis     Sleep apnea     Vertigo      Past Surgical History:   Procedure Laterality Date    CERVIX BIOPSY  03/2011    cone biopsy    CHOLECYSTECTOMY      COLONOSCOPY  6/2012    DILATION AND CURETTAGE OF UTERUS       Allergies   Allergen Reactions    Inderal [Propranolol Hcl]     Statins      Muscle aches    Tramadol Other (See Comments)     headache     Precautions: falls    Date of Admit: 10/4/2023  Room #: 1015/1015-A      Current functional status at time of admit:        Home Living/DME Available:      Type of Home: House  Home Access: Stairs to enter with rails  Bathroom Shower/Tub: Tub/Shower unit, Shower chair with back (however they also own a TTB if she needs it)  Bathroom Toilet: Handicap height (uses grab bar on tub to help with transfer)  Bathroom Equipment: Grab bars in shower, Hand-held shower (tub rail that fastens to side of tub)  Home Equipment: Walker, rolling, Rollator (also owns 2 BSCs, 2 W/Cs, slideboard (all inherited))       IADL Hx:   Homemaking Responsibilities: Yes  Active : No  Mode of

## 2023-10-05 NOTE — PROGRESS NOTES
Therapeutic Activity 35   Wheelchair Propulsion    Group    Other:    TOTAL 90       Electronically signed by:    Car Arnett, DENIS  10/5/2023, 16:43

## 2023-10-05 NOTE — PROGRESS NOTES
Comprehensive Nutrition Assessment    Type and Reason for Visit:  Initial, Consult (oral nutrition supplement)    Nutrition Recommendations/Plan:   Continue diet consistency as per speech therapy, current soft and bite sized  Will offer frozen oral nutrition supplements  Assist with meals as needed  Will continue to follow up during stay      Malnutrition Assessment:  Malnutrition Status: At risk for malnutrition (Comment) (10/05/23 1715)    Context:  Social/Environmental Circumstances       Nutrition Assessment:    Admit to acute rehab unit with weakness, fall and hx altered mental status. Currently on soft and bite sized diet as per speech therapy. Recent meal intake 50-75%. Patient reports increasing meal intake. Will offer frozent oral nutrition supplement at this time. Will follow at moderate nutrition risk at this time with concern for adequate intake. Nutrition Related Findings:    up in chair visiting with family, requesting nursing assist to get cleaned up,  hx vertigo, CP, COPD    BM today Wound Type:  (bruising from fall)       Current Nutrition Intake & Therapies:    Average Meal Intake: 51-75%  Average Supplements Intake: None Ordered  ADULT DIET; Dysphagia - Soft and Bite Sized    Anthropometric Measures:  Height: 5' 3\" (160 cm)  Ideal Body Weight (IBW): 115 lbs (52 kg)       Current Body Weight: 173 lb 4.5 oz (78.6 kg), 150.7 % IBW. Weight Source: Bed Scale  Current BMI (kg/m2): 30.7  Usual Body Weight: 174 lb (78.9 kg) (per hx June 2023 MD office)  % Weight Change (Calculated): -0.4  Weight Adjustment For: No Adjustment                 BMI Categories: Obese Class 1 (BMI 30.0-34. 9)    Estimated Daily Nutrient Needs:  Energy Requirements Based On: Formula  Weight Used for Energy Requirements: Current  Energy (kcal/day): 8948-7836 ([address] st jeor)  Weight Used for Protein Requirements: Ideal  Protein (g/day): 57-67 (1.1-1.3 g/kg)  Method Used for Fluid Requirements: ml/Kg  Fluid (ml/day): 1900 (25 ml/kg)    Nutrition Diagnosis:   Predicted inadequate energy intake related to cognitive or neurological impairment as evidenced by other (comment) (limited po data)    Nutrition Interventions:   Food and/or Nutrient Delivery: Continue Current Diet, Start Oral Nutrition Supplement  Nutrition Education/Counseling: No recommendation at this time  Coordination of Nutrition Care: Continue to monitor while inpatient, Feeding Assistance/Environment Change  Plan of Care discussed with: patient/family    Goals:     Goals: PO intake 75% or greater, by next RD assessment       Nutrition Monitoring and Evaluation:   Behavioral-Environmental Outcomes: None Identified  Food/Nutrient Intake Outcomes: Food and Nutrient Intake, Supplement Intake, Diet Advancement/Tolerance  Physical Signs/Symptoms Outcomes: Biochemical Data, Chewing or Swallowing, Meal Time Behavior, Skin, Weight    Discharge Planning:     Too soon to determine     Silvestre Herrmann RD, LD  Contact: [phone]

## 2023-10-06 PROCEDURE — 97116 GAIT TRAINING THERAPY: CPT

## 2023-10-06 PROCEDURE — 97530 THERAPEUTIC ACTIVITIES: CPT

## 2023-10-06 PROCEDURE — 94640 AIRWAY INHALATION TREATMENT: CPT

## 2023-10-06 PROCEDURE — 1280000000 HC REHAB R&B

## 2023-10-06 PROCEDURE — 97535 SELF CARE MNGMENT TRAINING: CPT

## 2023-10-06 PROCEDURE — 94010 BREATHING CAPACITY TEST: CPT

## 2023-10-06 PROCEDURE — 2580000003 HC RX 258: Performed by: INTERNAL MEDICINE

## 2023-10-06 PROCEDURE — 6370000000 HC RX 637 (ALT 250 FOR IP): Performed by: INTERNAL MEDICINE

## 2023-10-06 PROCEDURE — 99232 SBSQ HOSP IP/OBS MODERATE 35: CPT | Performed by: PHYSICAL MEDICINE & REHABILITATION

## 2023-10-06 PROCEDURE — 94761 N-INVAS EAR/PLS OXIMETRY MLT: CPT

## 2023-10-06 PROCEDURE — 97110 THERAPEUTIC EXERCISES: CPT

## 2023-10-06 PROCEDURE — 6360000002 HC RX W HCPCS: Performed by: INTERNAL MEDICINE

## 2023-10-06 PROCEDURE — 94150 VITAL CAPACITY TEST: CPT

## 2023-10-06 PROCEDURE — 6370000000 HC RX 637 (ALT 250 FOR IP): Performed by: PHYSICAL MEDICINE & REHABILITATION

## 2023-10-06 RX ORDER — BUDESONIDE AND FORMOTEROL FUMARATE DIHYDRATE 160; 4.5 UG/1; UG/1
2 AEROSOL RESPIRATORY (INHALATION)
Status: DISCONTINUED | OUTPATIENT
Start: 2023-10-06 | End: 2023-10-12 | Stop reason: HOSPADM

## 2023-10-06 RX ADMIN — PANTOPRAZOLE SODIUM 40 MG: 40 TABLET, DELAYED RELEASE ORAL at 05:09

## 2023-10-06 RX ADMIN — SODIUM CHLORIDE, PRESERVATIVE FREE 10 ML: 5 INJECTION INTRAVENOUS at 20:24

## 2023-10-06 RX ADMIN — SERTRALINE HYDROCHLORIDE 50 MG: 50 TABLET ORAL at 07:44

## 2023-10-06 RX ADMIN — IPRATROPIUM BROMIDE AND ALBUTEROL SULFATE 1 DOSE: 2.5; .5 SOLUTION RESPIRATORY (INHALATION) at 08:06

## 2023-10-06 RX ADMIN — EZETIMIBE 10 MG: 10 TABLET ORAL at 07:44

## 2023-10-06 RX ADMIN — IPRATROPIUM BROMIDE AND ALBUTEROL SULFATE 1 DOSE: 2.5; .5 SOLUTION RESPIRATORY (INHALATION) at 15:11

## 2023-10-06 RX ADMIN — Medication 1 TABLET: at 20:23

## 2023-10-06 RX ADMIN — LORAZEPAM 0.25 MG: 0.5 TABLET ORAL at 20:23

## 2023-10-06 RX ADMIN — ATENOLOL 12.5 MG: 25 TABLET ORAL at 20:23

## 2023-10-06 RX ADMIN — ENOXAPARIN SODIUM 40 MG: 100 INJECTION SUBCUTANEOUS at 07:44

## 2023-10-06 RX ADMIN — Medication 1 TABLET: at 07:44

## 2023-10-06 RX ADMIN — VALSARTAN AND HYDROCHLOROTHIAZIDE 1 TABLET: 320; 12.5 TABLET, FILM COATED ORAL at 07:44

## 2023-10-06 RX ADMIN — BUDESONIDE AND FORMOTEROL FUMARATE DIHYDRATE 2 PUFF: 160; 4.5 AEROSOL RESPIRATORY (INHALATION) at 12:37

## 2023-10-06 RX ADMIN — IPRATROPIUM BROMIDE AND ALBUTEROL SULFATE 1 DOSE: 2.5; .5 SOLUTION RESPIRATORY (INHALATION) at 12:36

## 2023-10-06 RX ADMIN — LORAZEPAM 0.25 MG: 0.5 TABLET ORAL at 07:44

## 2023-10-06 RX ADMIN — SODIUM CHLORIDE, PRESERVATIVE FREE 10 ML: 5 INJECTION INTRAVENOUS at 07:44

## 2023-10-06 RX ADMIN — SODIUM CHLORIDE, PRESERVATIVE FREE 10 ML: 5 INJECTION INTRAVENOUS at 00:28

## 2023-10-06 RX ADMIN — ACETAMINOPHEN 650 MG: 325 TABLET ORAL at 18:41

## 2023-10-06 RX ADMIN — ATENOLOL 12.5 MG: 25 TABLET ORAL at 07:44

## 2023-10-06 RX ADMIN — QUETIAPINE FUMARATE 25 MG: 25 TABLET ORAL at 20:32

## 2023-10-06 RX ADMIN — ACETAMINOPHEN 650 MG: 325 TABLET ORAL at 05:09

## 2023-10-06 ASSESSMENT — COPD QUESTIONNAIRES
QUESTION8_ENERGYLEVEL: 3
QUESTION6_LEAVINGHOUSE: 0
CAT_TOTALSCORE: 7
TOTAL_EXACERBATIONS_PASTYEAR: 0
QUESTION3_CHESTTIGHTNESS: 0
QUESTION1_COUGHFREQUENCY: 2
QUESTION7_SLEEPQUALITY: 0
QUESTION2_CHESTPHLEGM: 2
QUESTION5_HOMEACTIVITIES: 0
GOLD_GROUP: GROUP A
QUESTION4_WALKINCLINE: 0

## 2023-10-06 ASSESSMENT — PAIN DESCRIPTION - ONSET: ONSET: ON-GOING

## 2023-10-06 ASSESSMENT — PAIN DESCRIPTION - ORIENTATION: ORIENTATION: ANTERIOR

## 2023-10-06 ASSESSMENT — PAIN DESCRIPTION - PAIN TYPE: TYPE: CHRONIC PAIN

## 2023-10-06 ASSESSMENT — PULMONARY FUNCTION TESTS
PIF_VALUE: 85
POST BRONCHODILATOR FEV1/FVC: 98
FEV1 (%PREDICTED): 90

## 2023-10-06 ASSESSMENT — PAIN SCALES - GENERAL
PAINLEVEL_OUTOF10: 2
PAINLEVEL_OUTOF10: 5

## 2023-10-06 ASSESSMENT — PAIN DESCRIPTION - DESCRIPTORS
DESCRIPTORS: ACHING
DESCRIPTORS: ACHING

## 2023-10-06 ASSESSMENT — PAIN - FUNCTIONAL ASSESSMENT: PAIN_FUNCTIONAL_ASSESSMENT: ACTIVITIES ARE NOT PREVENTED

## 2023-10-06 ASSESSMENT — PAIN DESCRIPTION - LOCATION
LOCATION: HEAD
LOCATION: HEAD

## 2023-10-06 ASSESSMENT — PAIN DESCRIPTION - FREQUENCY: FREQUENCY: INTERMITTENT

## 2023-10-06 NOTE — PROGRESS NOTES
10/06/23 0729   Encounter Summary   Encounter Overview/Reason  Attempted Encounter   Service Provided For: Patient not available  (Staff caring for patient)   Referral/Consult From: [address] Family members   Last Encounter  10/06/23  (Patient is Worship; silent prayer given.   Patient was not available at this time; staff caring for patient.)   Complexity of Encounter Low   Begin Time 0725   End Time  0731   Total Time Calculated 6 min   Spiritual/Emotional needs   Type Spiritual Support   Assessment/Intervention/Outcome   Assessment Calm   Intervention Prayer (assurance of)/[place];Sustaining Presence/Ministry of presence   Outcome Receptive   Plan and Referrals   Plan/Referrals Continue Support (comment)

## 2023-10-06 NOTE — PROGRESS NOTES
Physical Therapy    [x] daily progress note       [] discharge       Patient Name:  Karina Kline   :  1940 MRN: [id number]  Room:  [address] [place] [address] Date of Admission: 10/4/2023  Rehabilitation Diagnosis:   Encephalopathy [C49.86]  Metabolic encephalopathy [Z31.00]       Date 10/6/2023       Day of ARU Week:  3   Time IN/OUT 1030/1100  1300/1430   Individual Tx Minutes 30+90   Group Tx Minutes    Co-Treat Minutes    Concurrent Tx Minutes    TOTAL Tx Time Mins 120   Variance Time    Variance Time []   Refusal due to:     []   Medical hold/reason:    []   Illness   []   Off Unit for test/procedure  []   Extra time needed to complete task  []   Therapeutic need  []   Other (specify):   Restrictions Restrictions/Precautions  Restrictions/Precautions: General Precautions, Fall Risk      Interdisciplinary communication [x]   Cleared for therapy per nursing     []   RN notified about issues during session  []   RN updated on pt performance  []   Spoke with   []   Spoke with OT  []   Spoke with MD  []   Other:    Subjective observations and cognitive status: (AM) Pt resting in recliner, alert and pleasant, confirms toileting need. Spouse present and supportive. (PM) Pt resting in recliner, states finishing her lunch, confirms toileting need, reports increased dryness of mouth.     Pain level/location: None reported at rest and with activities    Discharge recommendations  Anticipated discharge date:  TBD  Destination: []home alone   []home alone with assist PRN     [x] home w/ family      [] Continuous supervision  []SNF    [] Assisted living     [] Other:  Continued therapy: [x]HHC PT  []OUTPATIENT PT   [] No Further PT  []SNF PT  Caregiver training recommended: []Yes  [] No   Equipment needs: has 2WW      PT IRF-KECIA scores and goals for discharge assessment:     Sit to Stand  Assistance Needed: Supervision or touching assistance  Comment: SBA-Sup with 2WW  CARE Score: 4  Discharge Goal: activities/exercises completed this date:     []   Nu-step:  Time:        Level:         #Steps:       []   Rebounder:    []  Seated     []  Standing        [x]   Balance training: hand washing in unsupported stance with SBA; randomized level surface ambulation using 2WW with CGA as pt performed quick turns and visual scanning; pt was able to ambulate towards visual target as instructed with 100% accuracy. []   Postural training    []   Supine ther ex (reps/sets):     []   Seated ther ex (reps/sets):     []   Standing ther ex (reps/sets):     [x]   Other: Toileting activity completed with SBA-CGA   []   Other:    Comments:      Patient/Caregiver Education and Training:   []   Role of PT  []   Education about Dx  []   Use of call light for assist   []   HEP provided and explained   [x]   Treatment plan reviewed  []   Home safety  []   Wheelchair mobility/management   []   Body mechanics  []   Bed Mobility/Transfer technique  []   Gait technique/sequencing  []   Proper use of assistive device/adaptive equipment  [x]   Stair training/Advanced mobility safety and technique  []   Reinforced patient's precautions/mobility while maintaining precautions  []   Postural awareness  []   Family/caregiver training  [x]   Progress was updated and reviewed with patient and family this date. []   Other:    Treatment Plan for Next Session: car transfer, bed mobility training in regular bed; continue >/=150 ft level surface gait training      Assessment: This pt demonstrated a positive response to today's treatment as evidenced by increased ambulation tolerance over level surface and was less anxious. The patient is making good progress toward established goals as evidenced by QI scores.       Treatment/Activity Tolerance:   [x] Tolerated treatment with no adverse effects    [] Patient limited by fatigue  [] Patient limited by pain   [] Patient limited by medical complications:    [] Adverse reaction to Tx:   [] Significant

## 2023-10-06 NOTE — PROGRESS NOTES
Current GOLD classification for Sheila Lozoya      GOLD Stage:  No data recorded  Group: Group A  Recorded domestic exacerbations past 12 months: 0  Current recorded COPD Assessment Tool (CAT) score of 7  Current eosinophil count:  0.6     Inhaler Device   Acceptable for Use   Respimat  Not Breath Actuated Yes   MDI  Not Breath Actuated Yes           DPI  Observed PIF   using  In-Check Meter   Optimal PIF   Acceptable for Use   HANDIHALER 85 >30 YES   Pressair 85 >45 YES   NEOHALER 85 >50 YES   Diskus 85 >60 YES   ELLIPTA 85 >60        YES     Records show Sheila Lozoya was not using maintenance therapy prior to admission.  Will request Symbicort to complete recommendations based on COPD GOLD recommendations          LONG-ACTING (LABA)   Arformoterol (Brovana) NEBULIZER   Indacaterol (Arcapta) NEOHALER   Olodaterol (Striverdi) Respimat   Salmeterol (Serevent) MDI, DISKUS   LONG-ACTING (LAMA)   Aclidinium bromide (Tudorza) PRESSAIR   Glycopyrronium bromide [place] Nam) NEOHALER   Tiotropium (Spiriva) Respimat, HANDIHALER   Umeclidinium (Incruse) ELLIPTA   (LABA/LAMA)   Formoterol/glycopyrronium (Bevespi) MDI   Indacaterol/glycopyrronium (Utibron) NEOHALER   Vilanterol/umeclidinium (Anoro) ELLIPTA   Olodaterol/tiotropium (Stiolto) Respimat   (LABA/ICS)   Formoterol/budesomide (Symbicort) MDI   Formoterol/mometasone (Dulera) MDI   Salmeterol/fluticasone (Advair) MDI, DISKUS   Vilanterol/fluticasone (Breo) ELLIPTA   (LABA/LAMA/ICS)   Fluticasone/umeclidinium/vilanterol (Trelegy) ELLIPTA   Budesonide/glycopyrrolate/formoterol fumarate (Beztri) aerosphere     Electronically signed by Fidel Jacobson RCP on 10/6/23 at 10:44 AM EDT

## 2023-10-06 NOTE — PROGRESS NOTES
[x]  chair alarm set    []  Pt refused alarms                []  Telesitter activated      [x]  Gait belt used during tx session      []other:       Number of Minutes/Billable Intervention  Therapeutic Exercise 15   ADL Self-care 35   Neuro Re-Ed    Therapeutic Activity 10   Group    Other:    TOTAL 60       Social History  Social/Functional History  Lives With: Spouse (Gayleta People)  Type of Home: House  Home Layout: Two level, Able to Live on Main level with bedroom/bathroom, Laundry in basement (bedroom is on 1st floor and pt stays on this level (family converted dining room into a bedroom a few years ago))  Home Access: Stairs to enter with rails  Entrance Stairs - Number of Steps: 3 EMMA  Entrance Stairs - Rails: Right  Bathroom Shower/Tub: Tub/Shower unit, Shower chair with back (however they also own a TTB if she needs it)  Bathroom Toilet: Handicap height (uses grab bar on tub to help with transfer)  Bathroom Equipment: Grab bars in shower, Hand-held shower (tub rail that fastens to side of tub)  Bathroom Accessibility: Walker accessible  Home Equipment: Elyce [place], rolling, Rollator (also owns 2 BSCs, 2 W/Cs, slideboard (all inherited))  Has the patient had two or more falls in the past year or any fall with injury in the past year?: Yes (3 falls in the past year)  Receives Help From: Family  ADL Assistance: Independent  Homemaking Responsibilities: Yes  Meal Prep Responsibility: Secondary (doesn't handle hot pans, etc because of tremors)  Laundry Responsibility: Secondary ( carries laundry up and down stairs, pt helps sort/fold it)  Cleaning Responsibility: Secondary  Bill Paying/Finance Responsibility: Secondary ( writes checks 2* pt's tremor)  Shopping Responsibility: No  Health Care Management: Primary (would set up pillbox)  Ambulation Assistance: Independent (would use RW when she felt she needed to; other times would furniture cruise)  Transfer Assistance: Independent  Active : No  Patient's

## 2023-10-06 NOTE — PROGRESS NOTES
Lorna Render    : 1940  Acct #: [de-identified]  MRN: [id number]              PM&R Progress Note      Admitting diagnosis: Metabolic encephalopathy ([place] [address])     Comorbid diagnoses impacting rehabilitation: Generalized weakness, gait disturbance, acute respiratory failure with hypoxia, essential hypertension with episodic hypotension, COPD, depression with anxiety, strep viridans bacteremia and cerebral palsy. Chief complaint: Tired after therapy. Poor sleep last night. No nausea. Prior (baseline) level of function: Independent.     Current level of function:         Current  IRF-KECIA and Goals:   Occupational Therapy:    Short Term Goals  Time Frame for Short Term Goals: STGs=LTGs :   Long Term Goals  Time Frame for Long Term Goals : 10-14 days or until d/c  Long Term Goal 1: Pt will complete grooming tasks Ind  Long Term Goal 2: Pt will complete total body bathing c S using AE PRN  Long Term Goal 3: Pt will complete UB dressing Ind  Long Term Goal 4: Pt will complete LB dressing Ind  Long Term Goal 5: Pt will doff/don footwear mod I using AE PRN  Additional Goals?: Yes  Long Term Goal 6: Pt will complete toileting mod I  Long Term Goal 7: Pt will complete functional transfers (bed, chair, shower, toilet) c DME PRN and mod I  Long Term Goal 8: Pt will perform therex/therax to facilitate increased strength/endurance/ax tolerance (c emphasis on dynamic standing balance/tolerance > 10 mins, BUE endurance, cognitive retraining) c SBA  Long Term Goal 9: Pt will complete simple homemaking tasks c DME PRN and mod I :                                       Eating: Eating  Assistance Needed: Setup or clean-up assistance  Comment: 2* tremors  CARE Score: 5  Discharge Goal: Set-up or clean-up assistance       Oral Hygiene: Oral Hygiene  Assistance Needed: Supervision or touching assistance  Comment: in stance to use mouthwash  CARE Score: 4  Discharge Goal: Independent    UB/LB Bathing: Shower/Bathe

## 2023-10-07 LAB
ANION GAP SERPL CALCULATED.3IONS-SCNC: 11 MMOL/L (ref 4–16)
BUN SERPL-MCNC: 10 MG/DL (ref 6–23)
CALCIUM SERPL-MCNC: 9.5 MG/DL (ref 8.3–10.6)
CHLORIDE BLD-SCNC: 96 MMOL/L (ref 99–110)
CO2: 25 MMOL/L (ref 21–32)
CREAT SERPL-MCNC: 0.8 MG/DL (ref 0.6–1.1)
CULTURE: NORMAL
CULTURE: NORMAL
GFR SERPL CREATININE-BSD FRML MDRD: >60 ML/MIN/1.73M2
GLUCOSE SERPL-MCNC: 97 MG/DL (ref 70–99)
HCT VFR BLD CALC: 38.5 % (ref 37–47)
HEMOGLOBIN: 12.6 GM/DL (ref 12.5–16)
Lab: NORMAL
Lab: NORMAL
MCH RBC QN AUTO: 29.6 PG (ref 27–31)
MCHC RBC AUTO-ENTMCNC: 32.7 % (ref 32–36)
MCV RBC AUTO: 90.4 FL (ref 78–100)
PDW BLD-RTO: 12.3 % (ref 11.7–14.9)
PLATELET # BLD: 285 K/CU MM (ref 140–440)
PMV BLD AUTO: 9.6 FL (ref 7.5–11.1)
POTASSIUM SERPL-SCNC: 4.5 MMOL/L (ref 3.5–5.1)
RBC # BLD: 4.26 M/CU MM (ref 4.2–5.4)
SODIUM BLD-SCNC: 132 MMOL/L (ref 135–145)
SPECIMEN: NORMAL
SPECIMEN: NORMAL
WBC # BLD: 7.2 K/CU MM (ref 4–10.5)

## 2023-10-07 PROCEDURE — 6370000000 HC RX 637 (ALT 250 FOR IP): Performed by: INTERNAL MEDICINE

## 2023-10-07 PROCEDURE — 97535 SELF CARE MNGMENT TRAINING: CPT

## 2023-10-07 PROCEDURE — 6360000002 HC RX W HCPCS: Performed by: INTERNAL MEDICINE

## 2023-10-07 PROCEDURE — 97116 GAIT TRAINING THERAPY: CPT

## 2023-10-07 PROCEDURE — 94640 AIRWAY INHALATION TREATMENT: CPT

## 2023-10-07 PROCEDURE — 80048 BASIC METABOLIC PNL TOTAL CA: CPT

## 2023-10-07 PROCEDURE — 97530 THERAPEUTIC ACTIVITIES: CPT

## 2023-10-07 PROCEDURE — 6370000000 HC RX 637 (ALT 250 FOR IP): Performed by: PHYSICAL MEDICINE & REHABILITATION

## 2023-10-07 PROCEDURE — 97110 THERAPEUTIC EXERCISES: CPT

## 2023-10-07 PROCEDURE — 36415 COLL VENOUS BLD VENIPUNCTURE: CPT

## 2023-10-07 PROCEDURE — 1280000000 HC REHAB R&B

## 2023-10-07 PROCEDURE — 85027 COMPLETE CBC AUTOMATED: CPT

## 2023-10-07 PROCEDURE — 2580000003 HC RX 258: Performed by: INTERNAL MEDICINE

## 2023-10-07 PROCEDURE — 94761 N-INVAS EAR/PLS OXIMETRY MLT: CPT

## 2023-10-07 RX ADMIN — IPRATROPIUM BROMIDE AND ALBUTEROL SULFATE 1 DOSE: 2.5; .5 SOLUTION RESPIRATORY (INHALATION) at 21:08

## 2023-10-07 RX ADMIN — SODIUM CHLORIDE, PRESERVATIVE FREE 10 ML: 5 INJECTION INTRAVENOUS at 09:48

## 2023-10-07 RX ADMIN — QUETIAPINE FUMARATE 25 MG: 25 TABLET ORAL at 20:36

## 2023-10-07 RX ADMIN — ACETAMINOPHEN 650 MG: 325 TABLET ORAL at 05:51

## 2023-10-07 RX ADMIN — Medication 1 TABLET: at 20:39

## 2023-10-07 RX ADMIN — PANTOPRAZOLE SODIUM 40 MG: 40 TABLET, DELAYED RELEASE ORAL at 05:51

## 2023-10-07 RX ADMIN — ATENOLOL 12.5 MG: 25 TABLET ORAL at 20:36

## 2023-10-07 RX ADMIN — ACETAMINOPHEN 650 MG: 325 TABLET ORAL at 20:36

## 2023-10-07 RX ADMIN — Medication 1 TABLET: at 09:40

## 2023-10-07 RX ADMIN — SERTRALINE HYDROCHLORIDE 50 MG: 50 TABLET ORAL at 09:43

## 2023-10-07 RX ADMIN — LORAZEPAM 0.25 MG: 0.5 TABLET ORAL at 20:38

## 2023-10-07 RX ADMIN — IPRATROPIUM BROMIDE AND ALBUTEROL SULFATE 1 DOSE: 2.5; .5 SOLUTION RESPIRATORY (INHALATION) at 12:24

## 2023-10-07 RX ADMIN — ENOXAPARIN SODIUM 40 MG: 100 INJECTION SUBCUTANEOUS at 09:44

## 2023-10-07 RX ADMIN — LORAZEPAM 0.25 MG: 0.5 TABLET ORAL at 09:43

## 2023-10-07 RX ADMIN — VALSARTAN AND HYDROCHLOROTHIAZIDE 1 TABLET: 320; 12.5 TABLET, FILM COATED ORAL at 09:41

## 2023-10-07 RX ADMIN — BUDESONIDE AND FORMOTEROL FUMARATE DIHYDRATE 2 PUFF: 160; 4.5 AEROSOL RESPIRATORY (INHALATION) at 21:08

## 2023-10-07 RX ADMIN — SODIUM CHLORIDE, PRESERVATIVE FREE 10 ML: 5 INJECTION INTRAVENOUS at 20:40

## 2023-10-07 RX ADMIN — ATENOLOL 12.5 MG: 25 TABLET ORAL at 09:42

## 2023-10-07 RX ADMIN — EZETIMIBE 10 MG: 10 TABLET ORAL at 09:41

## 2023-10-07 ASSESSMENT — PAIN SCALES - WONG BAKER
WONGBAKER_NUMERICALRESPONSE: 0

## 2023-10-07 ASSESSMENT — PAIN SCALES - GENERAL
PAINLEVEL_OUTOF10: 3
PAINLEVEL_OUTOF10: 0

## 2023-10-07 ASSESSMENT — PAIN - FUNCTIONAL ASSESSMENT: PAIN_FUNCTIONAL_ASSESSMENT: PREVENTS OR INTERFERES SOME ACTIVE ACTIVITIES AND ADLS

## 2023-10-07 ASSESSMENT — PAIN DESCRIPTION - LOCATION: LOCATION: HEAD

## 2023-10-07 ASSESSMENT — PAIN DESCRIPTION - DESCRIPTORS: DESCRIPTORS: ACHING

## 2023-10-07 ASSESSMENT — PAIN DESCRIPTION - ORIENTATION: ORIENTATION: MID

## 2023-10-07 NOTE — PROGRESS NOTES
date.    Treatment Plan for Next Session: OT POC to continue.        Treatment/Activity Tolerance:   [x] Tolerated treatment with no adverse effects    [] Patient limited by fatigue  [] Patient limited by pain   [] Patient limited by medical complications:    [] Adverse reaction to Tx:   [] Significant change in status    Safety:       [x]  bed alarm set    [x]  chair alarm set    []  Pt refused alarms                []  Telesitter activated      [x]  Gait belt used during tx session      []other:       Number of Minutes/Billable Intervention  Therapeutic Exercise 30   ADL Self-care 60   Neuro Re-Ed    Therapeutic Activity 30   Group    Other:    TOTAL 120       Social History  Social/Functional History  Lives With: Spouse (Shaun Felder)  Type of Home: House  Home Layout: Two level, Able to Live on Main level with bedroom/bathroom, Laundry in basement (bedroom is on 1st floor and pt stays on this level (family converted dining room into a bedroom a few years ago))  Home Access: Stairs to enter with rails  Entrance Stairs - Number of Steps: 3 EMMA  Entrance Stairs - Rails: Right  Bathroom Shower/Tub: Tub/Shower unit, Shower chair with back (however they also own a TTB if she needs it)  Bathroom Toilet: Handicap height (uses grab bar on tub to help with transfer)  Bathroom Equipment: Grab bars in shower, Hand-held shower (tub rail that fastens to side of tub)  Bathroom Accessibility: Walker accessible  Home Equipment: Walker, rolling, Rollator (also owns 2 BSCs, 2 W/Cs, slideboard (all inherited))  Has the patient had two or more falls in the past year or any fall with injury in the past year?: Yes (3 falls in the past year)  Receives Help From: Family  ADL Assistance: Independent  Homemaking Responsibilities: Yes  Meal Prep Responsibility: Secondary (doesn't handle hot pans, etc because of tremors)  Laundry Responsibility: Secondary ( carries laundry up and down stairs, pt helps sort/fold it)  Cleaning Responsibility: Equipment evaluation, education, & procurement, Self-Care / ADL, Home management training, Cognitive/Perceptual training, Coordination training    Electronically signed by   GAURANG Nance,  10/7/2023, 3:41 PM

## 2023-10-07 NOTE — PROGRESS NOTES
Juni Vazquez    : 1940  Acct #: [de-identified]  MRN: [id number]              PM&R Progress Note      Admitting diagnosis: Metabolic encephalopathy ([place] 2.1)     Comorbid diagnoses impacting rehabilitation: Generalized weakness, gait disturbance, acute respiratory failure with hypoxia, essential hypertension with episodic hypotension, COPD, depression with anxiety, strep viridans bacteremia and cerebral palsy. Chief complaint: Quick to fatigue during therapy. No chills or new cough. Prior (baseline) level of function: Independent.     Current level of function:         Current  IRF-KECIA and Goals:   Occupational Therapy:    Short Term Goals  Time Frame for Short Term Goals: STGs=LTGs :   Long Term Goals  Time Frame for Long Term Goals : 10-14 days or until d/c  Long Term Goal 1: Pt will complete grooming tasks Ind  Long Term Goal 2: Pt will complete total body bathing c S using AE PRN  Long Term Goal 3: Pt will complete UB dressing Ind  Long Term Goal 4: Pt will complete LB dressing Ind  Long Term Goal 5: Pt will doff/don footwear mod I using AE PRN  Additional Goals?: Yes  Long Term Goal 6: Pt will complete toileting mod I  Long Term Goal 7: Pt will complete functional transfers (bed, chair, shower, toilet) c DME PRN and mod I  Long Term Goal 8: Pt will perform therex/therax to facilitate increased strength/endurance/ax tolerance (c emphasis on dynamic standing balance/tolerance > 10 mins, BUE endurance, cognitive retraining) c SBA  Long Term Goal 9: Pt will complete simple homemaking tasks c DME PRN and mod I :                                       Eating: Eating  Assistance Needed: Setup or clean-up assistance  Comment: 2* tremors  CARE Score: 5  Discharge Goal: Set-up or clean-up assistance       Oral Hygiene: Oral Hygiene  Assistance Needed: Supervision or touching assistance  Comment: in stance to use mouthwash  CARE Score: 4  Discharge Goal: Independent    UB/LB Bathing: Shower/Bathe

## 2023-10-07 NOTE — PROGRESS NOTES
Physical Therapy    [x] daily progress note       [] discharge       Patient Name:  Lorna Cuevas   :  1940 MRN: [id number]  Room:  [address] [place] [address] Date of Admission: 10/4/2023  Rehabilitation Diagnosis:   Encephalopathy [R49.81]  Metabolic encephalopathy [X73.97]       Date 10/7/2023       Day of ARU Week:  4   Time IN/OUT 1100/1200   Individual Tx Minutes 60   Group Tx Minutes    Co-Treat Minutes    Concurrent Tx Minutes    TOTAL Tx Time Mins 60   Variance Time    Variance Time []   Refusal due to:     []   Medical hold/reason:    []   Illness   []   Off Unit for test/procedure  []   Extra time needed to complete task  []   Therapeutic need  []   Other (specify):   Restrictions Restrictions/Precautions  Restrictions/Precautions: General Precautions, Fall Risk      Interdisciplinary communication [x]   Cleared for therapy per nursing     []   RN notified about issues during session  []   RN updated on pt performance  []   Spoke with   []   Spoke with OT  []   Spoke with MD  []   Other:    Subjective observations and cognitive status: Pt resting in recliner, reports incident of diarrhea earlier, spouse present in room and states wanting to take pt home soon due to waiting time to address pt's toileting needs.     Pain level/location: None reported at rest and with activities   Discharge recommendations  Anticipated discharge date:  TBD  Destination: []home alone   []home alone with assist PRN     [x] home w/ family      [] Continuous supervision  []SNF    [] Assisted living     [] Other:  Continued therapy: [x]HHC PT  []OUTPATIENT PT   [] No Further PT  []SNF PT  Caregiver training recommended: []Yes  [] No   Equipment needs: has 2WW      PT IRF-KECIA scores and goals for discharge assessment:   Roll Left and Right  Assistance Needed: Independent  Comment: Ind in regular bed  CARE Score: 6  Discharge Goal: Independent    Sit to Lying  Assistance Needed: Independent  Comment: Ind in regular bed  CARE

## 2023-10-08 VITALS
DIASTOLIC BLOOD PRESSURE: 53 MMHG | RESPIRATION RATE: 18 BRPM | HEIGHT: 63 IN | OXYGEN SATURATION: 95 % | SYSTOLIC BLOOD PRESSURE: 180 MMHG | HEART RATE: 66 BPM | WEIGHT: 174.38 LBS | BODY MASS INDEX: 30.9 KG/M2 | TEMPERATURE: 98.1 F

## 2023-10-08 PROCEDURE — 6370000000 HC RX 637 (ALT 250 FOR IP): Performed by: PHYSICAL MEDICINE & REHABILITATION

## 2023-10-08 PROCEDURE — 94640 AIRWAY INHALATION TREATMENT: CPT

## 2023-10-08 PROCEDURE — 6370000000 HC RX 637 (ALT 250 FOR IP): Performed by: INTERNAL MEDICINE

## 2023-10-08 PROCEDURE — 1280000000 HC REHAB R&B

## 2023-10-08 PROCEDURE — 94150 VITAL CAPACITY TEST: CPT

## 2023-10-08 PROCEDURE — 2580000003 HC RX 258: Performed by: INTERNAL MEDICINE

## 2023-10-08 PROCEDURE — 6360000002 HC RX W HCPCS: Performed by: INTERNAL MEDICINE

## 2023-10-08 PROCEDURE — 94761 N-INVAS EAR/PLS OXIMETRY MLT: CPT

## 2023-10-08 RX ORDER — ATENOLOL 25 MG/1
25 TABLET ORAL 2 TIMES DAILY
Status: DISCONTINUED | OUTPATIENT
Start: 2023-10-08 | End: 2023-10-12 | Stop reason: HOSPADM

## 2023-10-08 RX ORDER — IPRATROPIUM BROMIDE AND ALBUTEROL SULFATE 2.5; .5 MG/3ML; MG/3ML
1 SOLUTION RESPIRATORY (INHALATION) EVERY 4 HOURS PRN
Status: DISCONTINUED | OUTPATIENT
Start: 2023-10-08 | End: 2023-10-12

## 2023-10-08 RX ADMIN — Medication 1 TABLET: at 09:49

## 2023-10-08 RX ADMIN — ACETAMINOPHEN 650 MG: 325 TABLET ORAL at 06:36

## 2023-10-08 RX ADMIN — ATENOLOL 12.5 MG: 25 TABLET ORAL at 03:43

## 2023-10-08 RX ADMIN — Medication 1 TABLET: at 20:15

## 2023-10-08 RX ADMIN — ENOXAPARIN SODIUM 40 MG: 100 INJECTION SUBCUTANEOUS at 09:50

## 2023-10-08 RX ADMIN — BUDESONIDE AND FORMOTEROL FUMARATE DIHYDRATE 2 PUFF: 160; 4.5 AEROSOL RESPIRATORY (INHALATION) at 20:13

## 2023-10-08 RX ADMIN — EZETIMIBE 10 MG: 10 TABLET ORAL at 03:42

## 2023-10-08 RX ADMIN — VALSARTAN AND HYDROCHLOROTHIAZIDE 1 TABLET: 320; 12.5 TABLET, FILM COATED ORAL at 04:09

## 2023-10-08 RX ADMIN — LORAZEPAM 0.25 MG: 0.5 TABLET ORAL at 20:16

## 2023-10-08 RX ADMIN — ATENOLOL 25 MG: 25 TABLET ORAL at 09:54

## 2023-10-08 RX ADMIN — ATENOLOL 25 MG: 25 TABLET ORAL at 20:15

## 2023-10-08 RX ADMIN — PANTOPRAZOLE SODIUM 40 MG: 40 TABLET, DELAYED RELEASE ORAL at 05:39

## 2023-10-08 RX ADMIN — SERTRALINE HYDROCHLORIDE 50 MG: 50 TABLET ORAL at 09:47

## 2023-10-08 RX ADMIN — IPRATROPIUM BROMIDE AND ALBUTEROL SULFATE 1 DOSE: 2.5; .5 SOLUTION RESPIRATORY (INHALATION) at 07:45

## 2023-10-08 RX ADMIN — SODIUM CHLORIDE, PRESERVATIVE FREE 10 ML: 5 INJECTION INTRAVENOUS at 09:51

## 2023-10-08 RX ADMIN — QUETIAPINE FUMARATE 25 MG: 25 TABLET ORAL at 20:15

## 2023-10-08 RX ADMIN — SODIUM CHLORIDE, PRESERVATIVE FREE 10 ML: 5 INJECTION INTRAVENOUS at 20:16

## 2023-10-08 RX ADMIN — BUDESONIDE AND FORMOTEROL FUMARATE DIHYDRATE 2 PUFF: 160; 4.5 AEROSOL RESPIRATORY (INHALATION) at 07:48

## 2023-10-08 RX ADMIN — LORAZEPAM 0.25 MG: 0.5 TABLET ORAL at 09:48

## 2023-10-08 ASSESSMENT — PAIN DESCRIPTION - LOCATION
LOCATION: HEAD
LOCATION: HEAD

## 2023-10-08 ASSESSMENT — PAIN - FUNCTIONAL ASSESSMENT
PAIN_FUNCTIONAL_ASSESSMENT: PREVENTS OR INTERFERES SOME ACTIVE ACTIVITIES AND ADLS
PAIN_FUNCTIONAL_ASSESSMENT: PREVENTS OR INTERFERES SOME ACTIVE ACTIVITIES AND ADLS

## 2023-10-08 ASSESSMENT — PAIN DESCRIPTION - DESCRIPTORS
DESCRIPTORS: ACHING
DESCRIPTORS: ACHING

## 2023-10-08 ASSESSMENT — PAIN SCALES - WONG BAKER
WONGBAKER_NUMERICALRESPONSE: 2

## 2023-10-08 ASSESSMENT — PAIN SCALES - GENERAL
PAINLEVEL_OUTOF10: 2
PAINLEVEL_OUTOF10: 4
PAINLEVEL_OUTOF10: 0

## 2023-10-08 ASSESSMENT — PAIN DESCRIPTION - ORIENTATION
ORIENTATION: MID
ORIENTATION: MID

## 2023-10-08 ASSESSMENT — PAIN DESCRIPTION - FREQUENCY: FREQUENCY: INTERMITTENT

## 2023-10-08 ASSESSMENT — PAIN DESCRIPTION - ONSET: ONSET: ON-GOING

## 2023-10-08 ASSESSMENT — PAIN DESCRIPTION - PAIN TYPE: TYPE: CHRONIC PAIN

## 2023-10-09 PROCEDURE — 97530 THERAPEUTIC ACTIVITIES: CPT

## 2023-10-09 PROCEDURE — 2580000003 HC RX 258: Performed by: INTERNAL MEDICINE

## 2023-10-09 PROCEDURE — 6360000002 HC RX W HCPCS: Performed by: INTERNAL MEDICINE

## 2023-10-09 PROCEDURE — 6370000000 HC RX 637 (ALT 250 FOR IP): Performed by: PHYSICAL MEDICINE & REHABILITATION

## 2023-10-09 PROCEDURE — 97110 THERAPEUTIC EXERCISES: CPT

## 2023-10-09 PROCEDURE — 1280000000 HC REHAB R&B

## 2023-10-09 PROCEDURE — 6370000000 HC RX 637 (ALT 250 FOR IP): Performed by: INTERNAL MEDICINE

## 2023-10-09 PROCEDURE — 99232 SBSQ HOSP IP/OBS MODERATE 35: CPT | Performed by: PHYSICAL MEDICINE & REHABILITATION

## 2023-10-09 PROCEDURE — 97116 GAIT TRAINING THERAPY: CPT

## 2023-10-09 PROCEDURE — 97535 SELF CARE MNGMENT TRAINING: CPT

## 2023-10-09 RX ADMIN — VALSARTAN AND HYDROCHLOROTHIAZIDE 1 TABLET: 320; 12.5 TABLET, FILM COATED ORAL at 08:56

## 2023-10-09 RX ADMIN — ENOXAPARIN SODIUM 40 MG: 100 INJECTION SUBCUTANEOUS at 08:56

## 2023-10-09 RX ADMIN — SODIUM CHLORIDE, PRESERVATIVE FREE 10 ML: 5 INJECTION INTRAVENOUS at 08:57

## 2023-10-09 RX ADMIN — BUDESONIDE AND FORMOTEROL FUMARATE DIHYDRATE 2 PUFF: 160; 4.5 AEROSOL RESPIRATORY (INHALATION) at 18:59

## 2023-10-09 RX ADMIN — LORAZEPAM 0.25 MG: 0.5 TABLET ORAL at 20:33

## 2023-10-09 RX ADMIN — ATENOLOL 25 MG: 25 TABLET ORAL at 20:33

## 2023-10-09 RX ADMIN — SERTRALINE HYDROCHLORIDE 50 MG: 50 TABLET ORAL at 08:56

## 2023-10-09 RX ADMIN — PANTOPRAZOLE SODIUM 40 MG: 40 TABLET, DELAYED RELEASE ORAL at 05:45

## 2023-10-09 RX ADMIN — ACETAMINOPHEN 650 MG: 325 TABLET ORAL at 16:26

## 2023-10-09 RX ADMIN — Medication 1 TABLET: at 20:33

## 2023-10-09 RX ADMIN — ATENOLOL 25 MG: 25 TABLET ORAL at 08:56

## 2023-10-09 RX ADMIN — EZETIMIBE 10 MG: 10 TABLET ORAL at 08:57

## 2023-10-09 RX ADMIN — Medication 1 TABLET: at 08:57

## 2023-10-09 RX ADMIN — LORAZEPAM 0.25 MG: 0.5 TABLET ORAL at 08:57

## 2023-10-09 RX ADMIN — QUETIAPINE FUMARATE 25 MG: 25 TABLET ORAL at 20:34

## 2023-10-09 ASSESSMENT — PAIN DESCRIPTION - FREQUENCY: FREQUENCY: INTERMITTENT

## 2023-10-09 ASSESSMENT — PAIN DESCRIPTION - DIRECTION: RADIATING_TOWARDS: FACE

## 2023-10-09 ASSESSMENT — PAIN DESCRIPTION - PAIN TYPE: TYPE: ACUTE PAIN;CHRONIC PAIN

## 2023-10-09 ASSESSMENT — PAIN DESCRIPTION - DESCRIPTORS: DESCRIPTORS: ACHING

## 2023-10-09 ASSESSMENT — PAIN SCALES - WONG BAKER
WONGBAKER_NUMERICALRESPONSE: 0
WONGBAKER_NUMERICALRESPONSE: 0

## 2023-10-09 ASSESSMENT — PAIN DESCRIPTION - ORIENTATION: ORIENTATION: ANTERIOR

## 2023-10-09 ASSESSMENT — PAIN DESCRIPTION - LOCATION: LOCATION: HEAD

## 2023-10-09 ASSESSMENT — PAIN SCALES - GENERAL
PAINLEVEL_OUTOF10: 0
PAINLEVEL_OUTOF10: 3

## 2023-10-09 ASSESSMENT — PAIN DESCRIPTION - ONSET: ONSET: ON-GOING

## 2023-10-09 ASSESSMENT — PAIN - FUNCTIONAL ASSESSMENT: PAIN_FUNCTIONAL_ASSESSMENT: ACTIVITIES ARE NOT PREVENTED

## 2023-10-09 NOTE — PROGRESS NOTES
Results   Component Value Date    CREATININE 0.8 10/07/2023    BUN 10 10/07/2023     (L) 10/07/2023    K 4.5 10/07/2023    CL 96 (L) 10/07/2023    CO2 25 10/07/2023     Lab Results   Component Value Date    ALT 18 09/27/2023    AST 25 09/27/2023    ALKPHOS 69 09/27/2023    BILITOT 0.3 09/27/2023       Expected length of stay  prior to a supervised level of function for discharge home with a walker and [facility] OT/PT is 10/12/2023. Recommendations:    Metabolic encephalopathy with gait disturbance: She is responding to cueing to do a bit more more each day in the daily occupational and physical therapy with speech-language pathology. Working on building up balance and pulmonary endurance. With her ongoing impaired coordination, generalized weakness and distractibility, she remains at risk for fall with injury during standing ADLs and mobility activities. Continue providing her adaptive equipment training with strengthening exercises, balance reeducation and endurance building activities. No new pulmonary signs or symptoms recently. Case management is working on arranging caregiver training with her spouse. Outpatient follow-up with her PCP in about 2 weeks. Verbal cues and CGA-minimum physical assistance for transfers today. DVT prophylaxis: Continuing Lovenox 40 mg subcu daily. This increases her risk for spontaneous hemorrhage or GI bleeding. Continue providing her GI prophylaxis with a PPI. Weightbearing activities are starting to improve more consistently daily. No signs of acute blood loss. Her hemoglobin this weekend was 12.6. Acute respiratory failure with hypoxia: Continuing DuoNebs every 4 hours while awake, pulmonary hygiene measures and antianxiety therapies. Monitoring O2 saturation at rest and with activities. Hypertension with episodic hypotension: Encouraging oral hydration consistently. Continuing her Diovan HCTZ and Tenormin with parameters.   Today's blood pressure was a little

## 2023-10-09 NOTE — PLAN OF CARE
Problem: Discharge Planning  Goal: Discharge to home or other facility with appropriate resources  10/9/2023 1123 by Cheyenne Infante RN  Outcome: Progressing  10/9/2023 0038 by William Franklin RN  Outcome: Progressing     Problem: Safety - Adult  Goal: Free from fall injury  10/9/2023 1123 by Cheyenne Infante RN  Outcome: Progressing  10/9/2023 0038 by William Franklin RN  Outcome: Progressing     Problem: Skin/Tissue Integrity  Goal: Absence of new skin breakdown  Description: 1. Monitor for areas of redness and/or skin breakdown  2. Assess vascular access sites hourly  3. Every 4-6 hours minimum:  Change oxygen saturation probe site  4. Every 4-6 hours:  If on nasal continuous positive airway pressure, respiratory therapy assess nares and determine need for appliance change or resting period.   10/9/2023 1123 by Cheyenne Infante RN  Outcome: Progressing  10/9/2023 0038 by William Franklin RN  Outcome: Progressing     Problem: ABCDS Injury Assessment  Goal: Absence of physical injury  10/9/2023 1123 by Cheyenne Infante RN  Outcome: Progressing  10/9/2023 0038 by William Franklin RN  Outcome: Progressing     Problem: Pain  Goal: Verbalizes/displays adequate comfort level or baseline comfort level  10/9/2023 1123 by Cheyenne Infante RN  Outcome: Progressing  10/9/2023 0038 by William Franklin RN  Outcome: Progressing     Problem: Neurosensory - Adult  Goal: Achieves stable or improved neurological status  Outcome: Progressing  Goal: Achieves maximal functionality and self care  Outcome: Progressing     Problem: Musculoskeletal - Adult  Goal: Return mobility to safest level of function  Outcome: Progressing  Goal: Return ADL status to a safe level of function  Outcome: Progressing

## 2023-10-09 NOTE — PROGRESS NOTES
Physical Therapy    [x] daily progress note       [] discharge       Patient Name:  Boris Rodriguez   :  1940 MRN: [id number]  Room:  [address] [place] [address] Date of Admission: 10/4/2023  Rehabilitation Diagnosis:   Encephalopathy [R19.55]  Metabolic encephalopathy [J72.44]       Date 10/9/2023       Day of ARU Week:  6   Time IN/OUT 1300/1400  1515/1615   Individual Tx Minutes 60+60   Group Tx Minutes    Co-Treat Minutes    Concurrent Tx Minutes    TOTAL Tx Time Mins 120   Variance Time    Variance Time []   Refusal due to:     []   Medical hold/reason:    []   Illness   []   Off Unit for test/procedure  []   Extra time needed to complete task  []   Therapeutic need  []   Other (specify):   Restrictions Restrictions/Precautions  Restrictions/Precautions: General Precautions, Fall Risk      Interdisciplinary communication [x]   Cleared for therapy per nursing     []   RN notified about issues during session  [x]   RN updated on pt performance and clearing pt's spouse to assist pt during the day for toileting  []   Spoke with   []   Spoke with OT  []   Spoke with MD  []   Other:    Subjective observations and cognitive status: (PM1) Pt resting in recliner, ate lunch, confirms toileting need, eager to go home soon due to her and spouse's frustration related to waiting time to address her toileting need. (PM2) Pt and spouse confirmed safe ambulation in the room for toilet transfer and toileting since being cleared by this PT for spouse to assist when he is present in pt's room during the day. Spouse confirmed that pt had a BM and that he did not have to physically assist her during toileting task.     Pain level/location:   0 /10       Location:    Discharge recommendations  Anticipated discharge date:   TBD  Destination: []home alone   []home alone with assist PRN     [x] home w/ family      [] Continuous supervision  []SNF    [] Assisted living     [] Other:  Continued therapy: [x]HHC PT  []OUTPATIENT PT   []

## 2023-10-09 NOTE — PATIENT CARE CONFERENCE
RN    [] Poornima Redman RN    [] Steve Joe RN []       I have led this Team Conference and agree with the plan, Johnson Paul MD, 10/10/2023, 12:42 PM  []   I, the Medical Director, was required to lead today's conference via telephone due to an unanticipated scheduling conflict. I agree with the decisions made by the inter-disciplinary team at today's meeting. Goals have been updated to reflect recent status.     Team conference note transcribed this date by: Giles Mackay MA, Ambermoceasar, Therapy Coordinator

## 2023-10-09 NOTE — PROGRESS NOTES
Occupational Therapy    Physical Rehabilitation: OCCUPATIONAL THERAPY     [x] daily progress note       [] discharge       Patient Name:  Delisa Salinas   :  1940 MRN: [id number]  Room:  [address] [place] [address] Date of Admission: 10/4/2023  Rehabilitation Diagnosis:   Encephalopathy [Q36.13]  Metabolic encephalopathy [S16.95]       Date 10/9/2023       Day of ARU Week:  6   Time IN/OUT 0955/1055   Individual Tx Minutes 60   Group Tx Minutes    Co-Treat Minutes    Concurrent Tx Minutes    TOTAL Tx Time Mins 60   Variance Time    Variance Time []   Refusal due to:     []   Medical hold/reason:    []   Illness   []   Off Unit for test/procedure  []   Extra time needed to complete task  []   Therapeutic need  []   Other (specify):   Restrictions Restrictions/Precautions: General Precautions, Fall Risk         Communication with other providers: [x]   OK to see per nursing:     []   Spoke with team member regarding:      Subjective observations and cognitive status: Pt sitting in recliner upon entrance stating she already got ready for the day. Pt states she wants to go home and has already told staff this. Pt's spouse says Dorita Chawla is back to how she was before this happened\". Pain level/location:    /10       Location: Pt denies   Discharge recommendations  Anticipated discharge date:  TBD  Destination: []home alone   []home alone w assist prn   [x] home w/ family    [] Continuous supervision       []SNF    [] Assisted living     [] Other:   Continued therapy: [x]HHC OT  []OUTPATIENT  OT   [] No Further OT  Equipment needs: None, already owns BSC, SC, TTB       ADLs:    Toiletin [address] needed: Supervision or touching assistance  Comment: Supervision for clothing management and BM hygiene. CARE Score: 4  Discharge Goal: Independent      Toilet Transfers: Toilet Transfer  Assistance needed: Independent  Comment:  Mod Ind using 2WW and grab bars  CARE Score: 6  Discharge Goal: Independent  Device

## 2023-10-10 PROCEDURE — 6370000000 HC RX 637 (ALT 250 FOR IP): Performed by: PHYSICAL MEDICINE & REHABILITATION

## 2023-10-10 PROCEDURE — 6370000000 HC RX 637 (ALT 250 FOR IP): Performed by: INTERNAL MEDICINE

## 2023-10-10 PROCEDURE — 94664 DEMO&/EVAL PT USE INHALER: CPT

## 2023-10-10 PROCEDURE — 97530 THERAPEUTIC ACTIVITIES: CPT

## 2023-10-10 PROCEDURE — 94761 N-INVAS EAR/PLS OXIMETRY MLT: CPT

## 2023-10-10 PROCEDURE — 94150 VITAL CAPACITY TEST: CPT

## 2023-10-10 PROCEDURE — 97116 GAIT TRAINING THERAPY: CPT

## 2023-10-10 PROCEDURE — 94640 AIRWAY INHALATION TREATMENT: CPT

## 2023-10-10 PROCEDURE — 97535 SELF CARE MNGMENT TRAINING: CPT

## 2023-10-10 PROCEDURE — 99232 SBSQ HOSP IP/OBS MODERATE 35: CPT | Performed by: PHYSICAL MEDICINE & REHABILITATION

## 2023-10-10 PROCEDURE — 1280000000 HC REHAB R&B

## 2023-10-10 RX ADMIN — Medication 1 TABLET: at 08:07

## 2023-10-10 RX ADMIN — BUDESONIDE AND FORMOTEROL FUMARATE DIHYDRATE 2 PUFF: 160; 4.5 AEROSOL RESPIRATORY (INHALATION) at 19:09

## 2023-10-10 RX ADMIN — VALSARTAN AND HYDROCHLOROTHIAZIDE 1 TABLET: 320; 12.5 TABLET, FILM COATED ORAL at 08:06

## 2023-10-10 RX ADMIN — BUDESONIDE AND FORMOTEROL FUMARATE DIHYDRATE 2 PUFF: 160; 4.5 AEROSOL RESPIRATORY (INHALATION) at 08:33

## 2023-10-10 RX ADMIN — PANTOPRAZOLE SODIUM 40 MG: 40 TABLET, DELAYED RELEASE ORAL at 05:48

## 2023-10-10 RX ADMIN — ATENOLOL 25 MG: 25 TABLET ORAL at 08:07

## 2023-10-10 RX ADMIN — Medication 1 TABLET: at 20:46

## 2023-10-10 RX ADMIN — EZETIMIBE 10 MG: 10 TABLET ORAL at 08:07

## 2023-10-10 RX ADMIN — QUETIAPINE FUMARATE 25 MG: 25 TABLET ORAL at 20:47

## 2023-10-10 RX ADMIN — ATENOLOL 25 MG: 25 TABLET ORAL at 20:47

## 2023-10-10 RX ADMIN — ACETAMINOPHEN 650 MG: 325 TABLET ORAL at 08:06

## 2023-10-10 RX ADMIN — LORAZEPAM 0.25 MG: 0.5 TABLET ORAL at 20:46

## 2023-10-10 RX ADMIN — LORAZEPAM 0.25 MG: 0.5 TABLET ORAL at 08:06

## 2023-10-10 RX ADMIN — SERTRALINE HYDROCHLORIDE 50 MG: 50 TABLET ORAL at 08:06

## 2023-10-10 ASSESSMENT — PAIN DESCRIPTION - LOCATION
LOCATION: HEAD;FACE
LOCATION: HEAD

## 2023-10-10 ASSESSMENT — PAIN DESCRIPTION - PAIN TYPE: TYPE: ACUTE PAIN;CHRONIC PAIN

## 2023-10-10 ASSESSMENT — PAIN SCALES - GENERAL
PAINLEVEL_OUTOF10: 0
PAINLEVEL_OUTOF10: 3
PAINLEVEL_OUTOF10: 3

## 2023-10-10 ASSESSMENT — PAIN - FUNCTIONAL ASSESSMENT: PAIN_FUNCTIONAL_ASSESSMENT: ACTIVITIES ARE NOT PREVENTED

## 2023-10-10 ASSESSMENT — PAIN DESCRIPTION - DESCRIPTORS: DESCRIPTORS: ACHING;DISCOMFORT

## 2023-10-10 ASSESSMENT — PAIN DESCRIPTION - FREQUENCY: FREQUENCY: INTERMITTENT

## 2023-10-10 ASSESSMENT — PAIN DESCRIPTION - ONSET: ONSET: ON-GOING

## 2023-10-10 ASSESSMENT — PAIN DESCRIPTION - ORIENTATION: ORIENTATION: ANTERIOR

## 2023-10-10 ASSESSMENT — PAIN SCALES - WONG BAKER: WONGBAKER_NUMERICALRESPONSE: 0

## 2023-10-10 NOTE — PROGRESS NOTES
Ajitsandy  Additional Comments: Pt sleeps in a flat regular bed at home    Objective                                                                                    Goals:  (Update in navigator)  Short Term Goals  Time Frame for Short Term Goals: 7 tx days:  Short Term Goal 1: Pt will complete bed mobility (scooting, rolling R/L, and sup<->sit) Ind. Short Term Goal 2: Pt will complete OOB transfers using 2WW Mod Ind. Short Term Goal 3: Pt will ambulate 10 ft over level and uneven surfaces, 50 ft with turns over level surface, and 150 ft over level surface using 2WW Mod Ind. Short Term Goal 4: Pt will ascend/descend curb step using 2WW and 1 flight of stairs using railings Mod Ind. Short Term Goal 5: Pt will ascend/descend 3 steps using railing Mod Ind to safely manage home entrance. Additional Goals?: Yes  Short Term Goal 6: Pt will complete object retrieval from the floor with 2WW and using reacher Mod Ind.:   :        Plan of Care                                                                              Times per week: 5 days per week for a minimum of 60 minutes/day plus group as appropriate for 60 minutes.   Treatment to include Current Treatment Recommendations: Strengthening, ROM, Balance training, Functional mobility training, Transfer training, Endurance training, Gait training, Stair training, Home exercise program, Safety education & training, Patient/Caregiver education & training, Equipment evaluation, education, & procurement, Therapeutic activities, Group Therapy    Electronically signed by   Mirian Bond PT  10/10/2023, 1:09 PM

## 2023-10-10 NOTE — PROGRESS NOTES
Comprehensive Nutrition Assessment    Type and Reason for Visit:  Reassess    Nutrition Recommendations/Plan:   Continue soft and bite sized diet   Will continue to offer frozen oral nutrition supplement during stay  Will continue to follow up during stay      Malnutrition Assessment:  Malnutrition Status: At risk for malnutrition (Comment) (10/05/23 2898)    Context:  Social/Environmental Circumstances       Nutrition Assessment:    Remains on soft and bite sized diet, patient content with current diet. Meal intake %, patient reports good appetite and will consume frozen oral nutrition supplements. Will follow at low nutrition risk with reasonable intake at meals during stay. Nutrition Related Findings:    up in chair for lunch, feeling better and wants to discharge today Wound Type:  (bruising from fall)       Current Nutrition Intake & Therapies:    Average Meal Intake: %  Average Supplements Intake: % (per patient)  ADULT DIET; Dysphagia - Soft and Bite Sized  ADULT ORAL NUTRITION SUPPLEMENT; Lunch, Dinner; Frozen Oral Supplement    Anthropometric Measures:  Height: 5' 3\" (160 cm)  Ideal Body Weight (IBW): 115 lbs (52 kg)    Admission Body Weight: 173 lb 4.5 oz (78.6 kg)  Current Body Weight: 174 lb 2.6 oz (79 kg), 150.7 % IBW. Weight Source: Bed Scale  Current BMI (kg/m2): 30.9  Usual Body Weight: 174 lb (78.9 kg) (per hx June 2023 MD office)  % Weight Change (Calculated): -0.4  Weight Adjustment For: No Adjustment                 BMI Categories: Obese Class 1 (BMI 30.0-34. 9)    Estimated Daily Nutrient Needs:  Energy Requirements Based On: Formula  Weight Used for Energy Requirements: Current  Energy (kcal/day): 4147-0518 (m[address] st jeor)  Weight Used for Protein Requirements: Ideal  Protein (g/day): 57-67 (1.1-1.3 g/kg)  Method Used for Fluid Requirements: ml/Kg  Fluid (ml/day): 1900 (25 ml/kg)    Nutrition Diagnosis:   Predicted inadequate energy intake related to cognitive or

## 2023-10-10 NOTE — PROGRESS NOTES
With: Spouse (Eddy Bunn)  Type of Home: House  Home Layout: Two level, Able to Live on Main level with bedroom/bathroom, Laundry in basement (bedroom is on 1st floor and pt stays on this level (family converted dining room into a bedroom a few years ago))  Home Access: Stairs to enter with rails  Entrance Stairs - Number of Steps: 3 EMMA  Entrance Stairs - Rails: Right  Bathroom Shower/Tub: Tub/Shower unit, Shower chair with back (however they also own a TTB if she needs it)  Bathroom Toilet: Handicap height (uses grab bar on tub to help with transfer)  Bathroom Equipment: Grab bars in shower, Hand-held shower (tub rail that fastens to side of tub)  Bathroom Accessibility: Walker accessible  Home Equipment: [address], rolling, Rollator (also owns 2 BSCs, 2 W/Cs, slideboard (all inherited))  Has the patient had two or more falls in the past year or any fall with injury in the past year?: Yes (3 falls in the past year)  Receives Help From: Family  ADL Assistance: Independent  Homemaking Responsibilities: Yes  Meal Prep Responsibility: Secondary (doesn't handle hot pans, etc because of tremors)  Laundry Responsibility: Secondary ( carries laundry up and down stairs, pt helps sort/fold it)  Cleaning Responsibility: Secondary  Bill Paying/Finance Responsibility: Secondary ( writes checks 2* pt's tremor)  Shopping Responsibility: No  Health Care Management: Primary (would set up pillbox)  Ambulation Assistance: Independent (would use RW when she felt she needed to; other times would furniture cruise)  Transfer Assistance: Independent  Active : No  Patient's  Info: spouse  Mode of Transportation: Car, Truck  Additional Comments: Pt sleeps in a flat regular bed at home    Objective                                                                                    Goals:  (Update in navigator)  Short Term Goals  Time Frame for Short Term Goals: STGs=LTGs:  Long Term Goals  Time Frame for Long Term Goals :

## 2023-10-10 NOTE — PROGRESS NOTES
Pt requesting to resume home medication Myrbetiq for urinary incontinence. Dr. Chris Ornelas made aware.

## 2023-10-10 NOTE — PLAN OF CARE
Problem: Discharge Planning  Goal: Discharge to home or other facility with appropriate resources  Outcome: Progressing     Problem: Safety - Adult  Goal: Free from fall injury  Outcome: Progressing     Problem: Skin/Tissue Integrity  Goal: Absence of new skin breakdown  Description: 1. Monitor for areas of redness and/or skin breakdown  2. Assess vascular access sites hourly  3. Every 4-6 hours minimum:  Change oxygen saturation probe site  4. Every 4-6 hours:  If on nasal continuous positive airway pressure, respiratory therapy assess nares and determine need for appliance change or resting period.   Outcome: Progressing     Problem: ABCDS Injury Assessment  Goal: Absence of physical injury  Outcome: Progressing     Problem: Pain  Goal: Verbalizes/displays adequate comfort level or baseline comfort level  Outcome: Progressing     Problem: Neurosensory - Adult  Goal: Achieves stable or improved neurological status  Outcome: Progressing  Goal: Achieves maximal functionality and self care  Outcome: Progressing     Problem: Musculoskeletal - Adult  Goal: Return mobility to safest level of function  Outcome: Progressing  Goal: Return ADL status to a safe level of function  Outcome: Progressing

## 2023-10-10 NOTE — CARE COORDINATION
Patient reviewed at today's care conference. Patient and spouse are eager for dc home. Patient's BP meds were just adjusted. Dr Troy Bring agreeable to dc home 10/12. [address] [place] [address] pt/ot/RN recommended. No new DME needs. Case mgt updated patient and spouse in room. They requested dc tomorrow 10/11, but when case mgt reviewed the BP concerns, they're both agreeable to stay until 10/12/23 as recommended. Confirmed the patient has no DME needs. Case mgt will follow up for [address] [place] [address] agency choice. Patient reports increased urinary urgency and was embarrassed by an accident with therapy today. She reports her PCP had her on medication that was working, but she's not on it here. Patient requested contact information for Dr Morena Burr office, which was provided. She'll ask for the medication's name and let her ARU nurse know. Patient provided with list of Medicare participating [address] [place] [address] in the geographic area of the patient served. Patient selected [facility] and was provided with a comparative data handout from [address] [place] [address] website. The patient (and/or Family) was educated on the quality outcomes for each provider. Patient (and/or Family) demonstrated understanding. Per patient/family request, referral made to [facility].

## 2023-10-11 PROCEDURE — 6370000000 HC RX 637 (ALT 250 FOR IP): Performed by: INTERNAL MEDICINE

## 2023-10-11 PROCEDURE — 94640 AIRWAY INHALATION TREATMENT: CPT

## 2023-10-11 PROCEDURE — 97110 THERAPEUTIC EXERCISES: CPT

## 2023-10-11 PROCEDURE — 97530 THERAPEUTIC ACTIVITIES: CPT

## 2023-10-11 PROCEDURE — 94150 VITAL CAPACITY TEST: CPT

## 2023-10-11 PROCEDURE — 99232 SBSQ HOSP IP/OBS MODERATE 35: CPT | Performed by: PHYSICAL MEDICINE & REHABILITATION

## 2023-10-11 PROCEDURE — 1280000000 HC REHAB R&B

## 2023-10-11 PROCEDURE — 94761 N-INVAS EAR/PLS OXIMETRY MLT: CPT

## 2023-10-11 PROCEDURE — 6370000000 HC RX 637 (ALT 250 FOR IP): Performed by: PHYSICAL MEDICINE & REHABILITATION

## 2023-10-11 PROCEDURE — 92610 EVALUATE SWALLOWING FUNCTION: CPT

## 2023-10-11 PROCEDURE — 97535 SELF CARE MNGMENT TRAINING: CPT

## 2023-10-11 PROCEDURE — 97116 GAIT TRAINING THERAPY: CPT

## 2023-10-11 RX ADMIN — LORAZEPAM 0.25 MG: 0.5 TABLET ORAL at 08:31

## 2023-10-11 RX ADMIN — VALSARTAN AND HYDROCHLOROTHIAZIDE 1 TABLET: 320; 12.5 TABLET, FILM COATED ORAL at 08:33

## 2023-10-11 RX ADMIN — EZETIMIBE 10 MG: 10 TABLET ORAL at 08:30

## 2023-10-11 RX ADMIN — ATENOLOL 25 MG: 25 TABLET ORAL at 08:30

## 2023-10-11 RX ADMIN — BUDESONIDE AND FORMOTEROL FUMARATE DIHYDRATE 2 PUFF: 160; 4.5 AEROSOL RESPIRATORY (INHALATION) at 07:20

## 2023-10-11 RX ADMIN — LORAZEPAM 0.25 MG: 0.5 TABLET ORAL at 20:19

## 2023-10-11 RX ADMIN — SERTRALINE HYDROCHLORIDE 50 MG: 50 TABLET ORAL at 08:30

## 2023-10-11 RX ADMIN — ATENOLOL 25 MG: 25 TABLET ORAL at 20:18

## 2023-10-11 RX ADMIN — Medication 1 TABLET: at 20:19

## 2023-10-11 RX ADMIN — PANTOPRAZOLE SODIUM 40 MG: 40 TABLET, DELAYED RELEASE ORAL at 05:55

## 2023-10-11 RX ADMIN — Medication 1 TABLET: at 08:30

## 2023-10-11 RX ADMIN — QUETIAPINE FUMARATE 25 MG: 25 TABLET ORAL at 20:18

## 2023-10-11 RX ADMIN — BUDESONIDE AND FORMOTEROL FUMARATE DIHYDRATE 2 PUFF: 160; 4.5 AEROSOL RESPIRATORY (INHALATION) at 20:20

## 2023-10-11 NOTE — PROGRESS NOTES
Physical Rehabilitation: OCCUPATIONAL THERAPY     [x] daily progress note       [x] discharge       Patient Name:  Lorna Cuevas   :  1940 MRN: [id number]  Room:  [address] [place] [address] Date of Admission: 10/4/2023  Rehabilitation Diagnosis:   Encephalopathy [C87.25]  Metabolic encephalopathy [M10.21]       Date 10/11/2023       Day of ARU Week:  1   Time IN//1051   Individual Tx Minutes 75   Group Tx Minutes    Co-Treat Minutes    Concurrent Tx Minutes    TOTAL Tx Time Mins 75   Variance Time    Variance Time []   Refusal due to:     []   Medical hold/reason:    []   Illness   []   Off Unit for test/procedure  []   Extra time needed to complete task  []   Therapeutic need  []   Other (specify):   Restrictions Restrictions/Precautions: General Precautions, Fall Risk         Communication with other providers: [x]   OK to see per nursing:     []   Spoke with team member regarding:      Subjective observations and cognitive status: Patient up in recliner pleasant and agreeable to therapy; completed full shower ADL previous day, declines shower states she washed up this morning       Pain level/location:    /10       Location: per patient no pain noted    Discharge recommendations  Anticipated discharge date:  10/12  Destination: []home alone   []home alone w assist prn   [x] home w/ family    [] Continuous supervision       []SNF    [] Assisted living     [] Other:   Continued therapy: [x]HHC OT  []OUTPATIENT  OT   [] No Further OT  Equipment needs: None        ADLs:    Toiletin [address] needed: Independent  Comment: X  CARE Score: 6  Discharge Goal: Independent      Toilet Transfers:     Toilet Transfer  Assistance needed: Independent  Comment: X  CARE Score: 6  Discharge Goal: Independent  Device Used:    [x]   Standard Toilet         [x]   Grab Bars           []  Bedside Commode       []   Elevated Toilet          []   Other:        Bed Mobility:           [x]   Pt received out of bed

## 2023-10-11 NOTE — PROGRESS NOTES
Physical Therapy    [x] daily progress note       [x] discharge       Patient Name:  Sharyn Perla   :  1940 MRN: [id number]  Room:  [address] [place] [address] Date of Admission: 10/4/2023  Rehabilitation Diagnosis:   Encephalopathy [Q63.54]  Metabolic encephalopathy [P80.26]       Date 10/11/2023       Day of ARU Week:  1   Time IN/OUT 1112/1200  1435/1517   Individual Tx Minutes 48+42   Group Tx Minutes    Co-Treat Minutes    Concurrent Tx Minutes    TOTAL Tx Time Mins 90   Variance Time    Variance Time []   Refusal due to:     []   Medical hold/reason:    []   Illness   []   Off Unit for test/procedure  []   Extra time needed to complete task  []   Therapeutic need  []   Other (specify):   Restrictions Restrictions/Precautions  Restrictions/Precautions: General Precautions, Fall Risk      Interdisciplinary communication [x]   Cleared for therapy per nursing     []   RN notified about issues during session  []   RN updated on pt performance  []   Spoke with   []   Spoke with OT  []   Spoke with MD  []   Other:    Subjective observations and cognitive status: Pt resting in recliner, aware of discharge plan tomorrow, denied toileting need.     Pain level/location:   5 /10       Location: sinuses   Discharge recommendations  Anticipated discharge date:   10/12/2023  Destination: []home alone   []home alone with assist PRN     [x] home w/ family      [] Continuous supervision  []SNF    [] Assisted living     [] Other:  Continued therapy: [x]HHC PT  []OUTPATIENT PT   [] No Further PT  []SNF PT  Caregiver training recommended: []Yes  [] No   Equipment needs: has 2WW      PT IRF-KECIA scores and goals for discharge assessment:   Roll Left and Right  Assistance Needed: Independent  Comment: Ind in regular bed  CARE Score: 6  Discharge Goal: Independent    Sit to Lying  Assistance Needed: Independent  Comment: Ind in regular bed  CARE Score: 6  Discharge Goal: Independent    Lying to Sitting on Side of Bed  Assistance

## 2023-10-11 NOTE — PROGRESS NOTES
Discomfort  Functional Pain Assessment: Activities are not prevented  Pain Type: Acute pain, Chronic pain  Pain Radiating Towards: Face  Pain Frequency: Intermittent  Pain Onset: On-going  Non-Pharmaceutical Pain Intervention(s): Emotional support  Response to Pain Intervention: Patient satisfied  Side Effects: No reported side effects    Reason for Referral  Zonia Eugene was referred for a bedside swallow evaluation to assess the efficiency of her swallow function, identify signs and symptoms of aspiration and make recommendations regarding safe dietary consistencies, effective compensatory strategies, and safe eating environment. Admitting diagnosis: Metabolic encephalopathy ([facility] 2.1)     Comorbid diagnoses impacting rehabilitation: Generalized weakness, gait disturbance, acute respiratory failure with hypoxia, essential hypertension with episodic hypotension, COPD, depression with anxiety, strep viridans bacteremia and cerebral palsy. History of present illness: The patient is an 72-year-old right-hand-dominant female cerebral palsy, COPD and general anxiety who suffered mental status changes at home on 9/27/2023. She became unresponsive without clear limb shaking or trembling. Her  called the squad and they found the patient to be agitated and confused. She was given benzodiazepines and transported to the hospital.  There she was mildly hypotensive and hypoxic. She did not require intubation but was supported with nebulizers and supplemental oxygen. She remained intermittently agitated for a couple of days. Her respiratory failure was not clearly associated with pneumonia she has been treated as a COPD exacerbation. Her brain imaging did not show any acute hemorrhage, mass or edema. Neurology consultation is suggesting that her initial syncope may have been a vasovagal reaction or medication reaction.      Impression  Dysphagia Diagnosis: Swallow function appears [facility]  Dysphagia Outcome

## 2023-10-11 NOTE — CARE COORDINATION
Case mgt met with patient and spouse in room. They selected [address] [facility]. Referral will be made Thursday morning to [facility].

## 2023-10-11 NOTE — PROGRESS NOTES
Juni Vazquez    : 1940  Acct #: [de-identified]  MRN: [id number]              PM&R Progress Note      Admitting diagnosis: Metabolic encephalopathy ([place] 2.1)     Comorbid diagnoses impacting rehabilitation: Generalized weakness, gait disturbance, acute respiratory failure with hypoxia, essential hypertension with episodic hypotension, COPD, depression with anxiety, strep viridans bacteremia and cerebral palsy. Chief complaint: She denied any palpitations or sense of racing heart. She is looking forward to discharge this week. Prior (baseline) level of function: Independent.     Current level of function:         Current  IRF-KECIA and Goals:   Occupational Therapy:    Short Term Goals  Time Frame for Short Term Goals: STGs=LTGs :   Long Term Goals  Time Frame for Long Term Goals : 10-14 days or until d/c  Long Term Goal 1: Pt will complete grooming tasks Ind  Long Term Goal 2: Pt will complete total body bathing c S using AE PRN  Long Term Goal 3: Pt will complete UB dressing Ind  Long Term Goal 4: Pt will complete LB dressing Ind  Long Term Goal 5: Pt will doff/don footwear mod I using AE PRN  Additional Goals?: Yes  Long Term Goal 6: Pt will complete toileting mod I  Long Term Goal 7: Pt will complete functional transfers (bed, chair, shower, toilet) c DME PRN and mod I  Long Term Goal 8: Pt will perform therex/therax to facilitate increased strength/endurance/ax tolerance (c emphasis on dynamic standing balance/tolerance > 10 mins, BUE endurance, cognitive retraining) c SBA  Long Term Goal 9: Pt will complete simple homemaking tasks c DME PRN and mod I :                                       Eating: Eating  Assistance Needed: Independent  Comment: X  CARE Score: 6  Discharge Goal: Set-up or clean-up assistance       Oral Hygiene: Oral Hygiene  Assistance Needed: Independent  Comment: X  CARE Score: 6  Discharge Goal: Independent    UB/LB Bathing: Shower/Bathe Self  Assistance Needed:

## 2023-10-11 NOTE — DISCHARGE INSTRUCTIONS
Your information:  Name: Ty Lopez  : 1940    Your instructions:    Pt is discharging to home with [address], [address], [address]  [phone]  A representative from [facility] will contact you at home to schedule your home care needs      What to do after you leave the hospital:    Recommended diet: {diet:17034}    Recommended activity: {discharge activity:17167}        The following personal items were collected during your admission and were returned to you:    Belongings  Dental Appliances: None  Vision - Corrective Lenses: None  Hearing Aid: None  Clothing: At home  Jewelry: None  Body Piercings Removed: N/A  Electronic Devices: None  Weapons (Notify Protective Services/Security): None  Other Valuables: At home  Home Medications: Other (Comment)  Valuables Given To: Other (Comment)  Provide Name(s) of Who Valuable(s) Were Given To: Alistair Boyle Information obtained by:  By signing below, I understand that if any problems occur once I leave the hospital I am to contact ***. I understand and acknowledge receipt of the instructions indicated above.

## 2023-10-12 ENCOUNTER — TELEPHONE (OUTPATIENT)
Dept: FAMILY MEDICINE CLINIC | Age: 83
End: 2023-10-12

## 2023-10-12 VITALS
OXYGEN SATURATION: 94 % | HEART RATE: 65 BPM | SYSTOLIC BLOOD PRESSURE: 176 MMHG | HEIGHT: 63 IN | TEMPERATURE: 97.5 F | BODY MASS INDEX: 29.57 KG/M2 | DIASTOLIC BLOOD PRESSURE: 53 MMHG | RESPIRATION RATE: 16 BRPM | WEIGHT: 166.89 LBS

## 2023-10-12 PROCEDURE — 6370000000 HC RX 637 (ALT 250 FOR IP): Performed by: INTERNAL MEDICINE

## 2023-10-12 PROCEDURE — 6370000000 HC RX 637 (ALT 250 FOR IP): Performed by: PHYSICAL MEDICINE & REHABILITATION

## 2023-10-12 PROCEDURE — 94640 AIRWAY INHALATION TREATMENT: CPT

## 2023-10-12 PROCEDURE — 99239 HOSP IP/OBS DSCHRG MGMT >30: CPT | Performed by: PHYSICAL MEDICINE & REHABILITATION

## 2023-10-12 PROCEDURE — 94761 N-INVAS EAR/PLS OXIMETRY MLT: CPT

## 2023-10-12 PROCEDURE — 94150 VITAL CAPACITY TEST: CPT

## 2023-10-12 RX ORDER — BUDESONIDE AND FORMOTEROL FUMARATE DIHYDRATE 160; 4.5 UG/1; UG/1
2 AEROSOL RESPIRATORY (INHALATION)
Qty: 10.2 G | Refills: 0 | Status: SHIPPED | OUTPATIENT
Start: 2023-10-12

## 2023-10-12 RX ORDER — QUETIAPINE FUMARATE 25 MG/1
25 TABLET, FILM COATED ORAL NIGHTLY
Qty: 10 TABLET | Refills: 0 | Status: SHIPPED | OUTPATIENT
Start: 2023-10-12 | End: 2023-10-13 | Stop reason: ALTCHOICE

## 2023-10-12 RX ORDER — LORAZEPAM 0.5 MG/1
TABLET ORAL
Qty: 15 TABLET | Refills: 0 | Status: SHIPPED | OUTPATIENT
Start: 2023-10-12 | End: 2023-10-27

## 2023-10-12 RX ADMIN — ATENOLOL 25 MG: 25 TABLET ORAL at 09:19

## 2023-10-12 RX ADMIN — ACETAMINOPHEN 650 MG: 325 TABLET ORAL at 00:10

## 2023-10-12 RX ADMIN — VALSARTAN AND HYDROCHLOROTHIAZIDE 1 TABLET: 320; 12.5 TABLET, FILM COATED ORAL at 09:28

## 2023-10-12 RX ADMIN — Medication 1 TABLET: at 09:18

## 2023-10-12 RX ADMIN — LORAZEPAM 0.25 MG: 0.5 TABLET ORAL at 09:18

## 2023-10-12 RX ADMIN — SERTRALINE HYDROCHLORIDE 50 MG: 50 TABLET ORAL at 09:19

## 2023-10-12 RX ADMIN — BUDESONIDE AND FORMOTEROL FUMARATE DIHYDRATE 2 PUFF: 160; 4.5 AEROSOL RESPIRATORY (INHALATION) at 07:51

## 2023-10-12 RX ADMIN — PANTOPRAZOLE SODIUM 40 MG: 40 TABLET, DELAYED RELEASE ORAL at 05:44

## 2023-10-12 RX ADMIN — EZETIMIBE 10 MG: 10 TABLET ORAL at 09:17

## 2023-10-12 ASSESSMENT — PAIN DESCRIPTION - LOCATION: LOCATION: HEAD

## 2023-10-12 ASSESSMENT — PAIN SCALES - GENERAL: PAINLEVEL_OUTOF10: 5

## 2023-10-12 ASSESSMENT — PAIN DESCRIPTION - DESCRIPTORS: DESCRIPTORS: ACHING

## 2023-10-12 ASSESSMENT — PAIN SCALES - WONG BAKER: WONGBAKER_NUMERICALRESPONSE: 0

## 2023-10-12 NOTE — CARE COORDINATION
ARU  Discharge Summary    D/C Date: 10/23/2023    Patient discharged to:  home with spouse    Transported by: spouse    Referrals made to: [facility]    Additional information: no DME needs    Caregiver training: none requested    Discharge BIMS completed:  [x]

## 2023-10-12 NOTE — CARE COORDINATION
[facility] Liaison aware of discharge & will initiate [address]. Verified address, pcp and number with the pt.

## 2023-10-12 NOTE — PROGRESS NOTES
participate. Minimizing distractions after dark help with sleep regulation. Purposeful tapering of the lorazepam while using Zoloft and Seroquel at night. Cerebral palsy: Incorporating adaptive equipment training and balance reeducation into her treatment for her encephalopathy to help her build up confidence with moving about on her feet. No new pain or clumsiness noted.

## 2023-10-12 NOTE — PROGRESS NOTES
Pt meets all criteria for discharge at this time. Pt's  at bedside, reviewed in detail all discharge instructions, medications, and follow-up appointments. Stressed importance of compliance with medications. Phone numbers given to call for any questions. Pt and family asking appropriate questions and all questions were answered to their satisfaction. Verbalize understanding and agreeable to discharge. All prescriptions received by patient. All belongings taken with patient at discharge.

## 2023-10-12 NOTE — DISCHARGE SUMMARY
Discharged home with [facility] AT [facility] PT/OT/RN arranged. High Risk Drug Classes:  Use and Indication    Is taking: Check if the patient is taking any medications (or has them prescribed) by pharmacological classification, not how it is used, in the following classes  Indication noted: If column 1 is checked, check if there is an indication noted for all meds in the drug class Is taking  (check all that apply) Indication noted (check all that apply)   Antipsychotic [x] [x]   Anticoagulant [] []   Antibiotic [] []   Opioid [] []   Antiplatelet [] []   Hypoglycemic (including insulin) [] []   None of the above []        Medication List        START taking these medications      budesonide-formoterol 160-4.5 MCG/ACT Aero  Commonly known as: SYMBICORT  Inhale 2 puffs into the lungs in the morning and 2 puffs in the evening. LORazepam 0.5 MG tablet  Commonly known as: ATIVAN  Take 1 tablet twice a day for 5 days (10 doses) and then 1 tablet every other day for 5 doses. QUEtiapine 25 MG tablet  Commonly known as: SEROQUEL  Take 1 tablet by mouth nightly            CHANGE how you take these medications      sertraline 50 MG tablet  Commonly known as: ZOLOFT  Take 1 tablet by mouth daily  Start taking on: October 13, 2023  What changed:   medication strength  how much to take            CONTINUE taking these medications      alendronate 70 MG tablet  Commonly known as: FOSAMAX  TAKE 1 TABLET BY MOUTH EVERY 7 DAYS WITH A FULL GLASS OF WATER UPON ARISING FOR THE DAY ON AN EMPTY STOMACH AT LEAST 30 MINUTES BEFORE THE FIRST FOOD,YVONNE, OR MED. STAY UPRIGHT FOR AT LEAST 30 MINUTES     atenolol 50 MG tablet  Commonly known as: TENORMIN  TAKE 1/2 TABLET BY MOUTH TWICE A DAY     calcium carbonate-vitamin D 600-400 MG-UNIT Tabs per tab  Commonly known as: Caltrate 600+D  Take 1 tablet by mouth 2 times daily.      cetirizine 5 MG tablet  Commonly known as: ZYRTEC  TAKE 1 TABLET BY MOUTH EVERY DAY     ezetimibe 10 MG tablet  Commonly

## 2023-10-12 NOTE — PROGRESS NOTES
ARU Discharge Assessment - [facility]    Transportation  \"In the past 6-12 months, has lack of transportation kept you from medical appointments, meetings, work, or from getting things needed for daily living? \"Check all that apply:  [] A.  Yes, it has kept me from medical appointments or from getting my medications  [] B.  Yes, it has kept me from non-medical meetings, appointments, work, or from getting things that I need  [x] C.  No  [] X. Patient unable to respond    Provision of Current Reconciled Medication List to Subsequent Provider at Discharge     [x] No, current reconciled medication list not provided to the subsequent provider. NO if D/C home with Outpatient or no services   [] Yes, current reconciled medication list provided to the subsequent provider. YES if D/C to Acute hospital, SNF, home with home health  (**We MUST Select route of transmission below**)      [] Via Electronic Health Record   [] [email or web address]  [] Verbal (e.g. in person, telephone, video conferencing)  [] Paper-based (e.g. fax, copies, printouts)   [] Other Methods (e.g. texting, email, CDs)    Provision of Current Reconciled Medication List to Patient at Discharge  [] No, current reconciled medication list not provided to the patient, family and/or caregiver. NO If D/C to Acute hospital, SNF, home with home health   [x] Yes, current reconciled medication list provided to the patient, family and/or caregiver.   YES if D/C home with Outpatient or no services   (**WE MUST Select route of transmission below**)     [] Via Electronic Health Record (e.g., electronic access to patient portal)   [] Via [facility]  [] Verbal (e.g. in person, telephone, video conferencing)  [] Paper-based (e.g. fax, copies, printouts)   [] Other Methods (e.g. texting, email, CDs)    Health Literacy  \"How often do you need to have someone help you when you read instructions, pamphlets,

## 2023-10-12 NOTE — PROGRESS NOTES
Discharged home with [facility] AT [facility] PT/OT/RN arranged. High Risk Drug Classes:  Use and Indication    Is taking: Check if the patient is taking any medications (or has them prescribed) by pharmacological classification, not how it is used, in the following classes  Indication noted: If column 1 is checked, check if there is an indication noted for all meds in the drug class Is taking  (check all that apply) Indication noted (check all that apply)   Antipsychotic [x] [x]   Anticoagulant [] []   Antibiotic [] []   Opioid [] []   Antiplatelet [] []   Hypoglycemic (including insulin) [] []   None of the above []        Medication List        START taking these medications      budesonide-formoterol 160-4.5 MCG/ACT Aero  Commonly known as: SYMBICORT  Inhale 2 puffs into the lungs in the morning and 2 puffs in the evening. LORazepam 0.5 MG tablet  Commonly known as: ATIVAN  Take 1 tablet twice a day for 5 days (10 doses) and then 1 tablet every other day for 5 doses. QUEtiapine 25 MG tablet  Commonly known as: SEROQUEL  Take 1 tablet by mouth nightly            CHANGE how you take these medications      sertraline 50 MG tablet  Commonly known as: ZOLOFT  Take 1 tablet by mouth daily  Start taking on: October 13, 2023  What changed:   medication strength  how much to take            CONTINUE taking these medications      alendronate 70 MG tablet  Commonly known as: FOSAMAX  TAKE 1 TABLET BY MOUTH EVERY 7 DAYS WITH A FULL GLASS OF WATER UPON ARISING FOR THE DAY ON AN EMPTY STOMACH AT LEAST 30 MINUTES BEFORE THE FIRST FOOD,YVONNE, OR MED. STAY UPRIGHT FOR AT LEAST 30 MINUTES     atenolol 50 MG tablet  Commonly known as: TENORMIN  TAKE 1/2 TABLET BY MOUTH TWICE A DAY     calcium carbonate-vitamin D 600-400 MG-UNIT Tabs per tab  Commonly known as: Caltrate 600+D  Take 1 tablet by mouth 2 times daily.      cetirizine 5 MG tablet  Commonly known as: ZYRTEC  TAKE 1 TABLET BY MOUTH EVERY DAY     ezetimibe 10 MG tablet  Commonly

## 2023-10-13 ENCOUNTER — CLINICAL DOCUMENTATION ONLY (OUTPATIENT)
Facility: CLINIC | Age: 83
End: 2023-10-13

## 2023-10-13 ENCOUNTER — TELEPHONE (OUTPATIENT)
Dept: FAMILY MEDICINE CLINIC | Age: 83
End: 2023-10-13

## 2023-10-13 NOTE — TELEPHONE ENCOUNTER
PT WAS AT ER 9/27 FOR FALL AND RUBIN IS JUST CONCERNED BECAUSE SHE IS ON SEROQUEL, ZOLOFT, LORAZEPAM AT NIGHT and that when she tends to fall.  PLEASE ADVISE

## 2023-10-25 DIAGNOSIS — R09.82 POST-NASAL DRIP: ICD-10-CM

## 2023-10-25 RX ORDER — LEVOCETIRIZINE DIHYDROCHLORIDE 5 MG/1
TABLET, FILM COATED ORAL
Qty: 45 TABLET | Refills: 1 | OUTPATIENT
Start: 2023-10-25

## 2023-11-07 DIAGNOSIS — F41.9 ANXIETY: ICD-10-CM

## 2023-11-08 RX ORDER — LORAZEPAM 0.5 MG/1
0.5 TABLET ORAL EVERY 12 HOURS PRN
Qty: 30 TABLET | Refills: 0 | Status: SHIPPED | OUTPATIENT
Start: 2023-11-08 | End: 2023-11-23

## 2023-11-14 ENCOUNTER — OFFICE VISIT (OUTPATIENT)
Dept: FAMILY MEDICINE CLINIC | Age: 83
End: 2023-11-14
Payer: MEDICARE

## 2023-11-14 VITALS
HEIGHT: 63 IN | OXYGEN SATURATION: 95 % | WEIGHT: 155 LBS | BODY MASS INDEX: 27.46 KG/M2 | RESPIRATION RATE: 20 BRPM | SYSTOLIC BLOOD PRESSURE: 116 MMHG | DIASTOLIC BLOOD PRESSURE: 72 MMHG | HEART RATE: 55 BPM

## 2023-11-14 DIAGNOSIS — Z09 HOSPITAL DISCHARGE FOLLOW-UP: Primary | ICD-10-CM

## 2023-11-14 PROCEDURE — G8427 DOCREV CUR MEDS BY ELIG CLIN: HCPCS | Performed by: PHYSICIAN ASSISTANT

## 2023-11-14 PROCEDURE — 3078F DIAST BP <80 MM HG: CPT | Performed by: PHYSICIAN ASSISTANT

## 2023-11-14 PROCEDURE — G8399 PT W/DXA RESULTS DOCUMENT: HCPCS | Performed by: PHYSICIAN ASSISTANT

## 2023-11-14 PROCEDURE — 1090F PRES/ABSN URINE INCON ASSESS: CPT | Performed by: PHYSICIAN ASSISTANT

## 2023-11-14 PROCEDURE — 3074F SYST BP LT 130 MM HG: CPT | Performed by: PHYSICIAN ASSISTANT

## 2023-11-14 PROCEDURE — 1111F DSCHRG MED/CURRENT MED MERGE: CPT | Performed by: PHYSICIAN ASSISTANT

## 2023-11-14 PROCEDURE — 1036F TOBACCO NON-USER: CPT | Performed by: PHYSICIAN ASSISTANT

## 2023-11-14 PROCEDURE — G8417 CALC BMI ABV UP PARAM F/U: HCPCS | Performed by: PHYSICIAN ASSISTANT

## 2023-11-14 PROCEDURE — 99214 OFFICE O/P EST MOD 30 MIN: CPT | Performed by: PHYSICIAN ASSISTANT

## 2023-11-14 PROCEDURE — G8484 FLU IMMUNIZE NO ADMIN: HCPCS | Performed by: PHYSICIAN ASSISTANT

## 2023-11-14 PROCEDURE — 1123F ACP DISCUSS/DSCN MKR DOCD: CPT | Performed by: PHYSICIAN ASSISTANT

## 2023-11-14 NOTE — PROGRESS NOTES
erythema or rash. Neurological:      General: No focal deficit present. Mental Status: She is alert and oriented to person, place, and time. Coordination: Coordination abnormal.      Gait: Gait abnormal.      Comments: Ambulates with walker   Psychiatric:         Mood and Affect: Mood normal.         Behavior: Behavior normal.         An electronic signature was used to authenticate this note.   --MIR Ralph

## 2023-11-16 DIAGNOSIS — I10 PRIMARY HYPERTENSION: ICD-10-CM

## 2023-11-16 RX ORDER — VALSARTAN AND HYDROCHLOROTHIAZIDE 320; 12.5 MG/1; MG/1
TABLET, FILM COATED ORAL
Qty: 90 TABLET | Refills: 1 | Status: SHIPPED | OUTPATIENT
Start: 2023-11-16

## 2023-12-01 DIAGNOSIS — J01.10 ACUTE NON-RECURRENT FRONTAL SINUSITIS: ICD-10-CM

## 2023-12-01 RX ORDER — CETIRIZINE HYDROCHLORIDE 5 MG/1
TABLET ORAL
Qty: 90 TABLET | Refills: 1 | Status: SHIPPED | OUTPATIENT
Start: 2023-12-01

## 2023-12-14 DIAGNOSIS — G89.4 CHRONIC PAIN SYNDROME: ICD-10-CM

## 2023-12-14 DIAGNOSIS — F41.1 GENERALIZED ANXIETY DISORDER: ICD-10-CM

## 2023-12-15 LAB
AMPHETAMINES UR QL SCN>1000 NG/ML: NORMAL
BARBITURATES UR QL SCN>200 NG/ML: NORMAL
BENZODIAZ UR QL SCN>200 NG/ML: NORMAL
CANNABINOIDS UR QL SCN>50 NG/ML: NORMAL
COCAINE UR QL SCN: NORMAL
DRUG SCREEN COMMENT UR-IMP: NORMAL
FENTANYL SCREEN, URINE: NORMAL
METHADONE UR QL SCN>300 NG/ML: NORMAL
OPIATES UR QL SCN>300 NG/ML: NORMAL
OXYCODONE UR QL SCN: NORMAL
PCP UR QL SCN>25 NG/ML: NORMAL
PH UR STRIP: 6 [PH]

## 2024-01-02 NOTE — TELEPHONE ENCOUNTER
LV 12-14-23  Na 3-14-24  [facility] [address]   PATIENT IS STILL TAKING THIS MEDICATION   PATIENT HAS 2 PILLS LEFT

## 2024-01-07 DIAGNOSIS — M85.80 OSTEOPENIA, UNSPECIFIED LOCATION: ICD-10-CM

## 2024-01-08 RX ORDER — ALENDRONATE SODIUM 70 MG/1
TABLET ORAL
Qty: 12 TABLET | Refills: 1 | Status: SHIPPED | OUTPATIENT
Start: 2024-01-08 | End: 2024-07-06

## 2024-01-13 DIAGNOSIS — E78.5 HYPERLIPIDEMIA, UNSPECIFIED HYPERLIPIDEMIA TYPE: ICD-10-CM

## 2024-01-15 RX ORDER — EZETIMIBE 10 MG/1
TABLET ORAL
Qty: 90 TABLET | Refills: 0 | Status: SHIPPED | OUTPATIENT
Start: 2024-01-15

## 2024-01-15 NOTE — TELEPHONE ENCOUNTER
Requested Prescriptions     Signed Prescriptions Disp Refills    ezetimibe (ZETIA) 10 MG tablet 90 tablet 0     Sig: TAKE 1 TABLET BY MOUTH EVERY DAY     Authorizing Provider: MICHELE RAM     Ordering User: LISA FULLER

## 2024-01-23 DIAGNOSIS — F41.9 ANXIETY: ICD-10-CM

## 2024-01-25 RX ORDER — LORAZEPAM 0.5 MG/1
0.5 TABLET ORAL DAILY PRN
Qty: 30 TABLET | Refills: 2 | Status: SHIPPED | OUTPATIENT
Start: 2024-01-25 | End: 2024-04-24

## 2024-02-27 ENCOUNTER — TELEPHONE (OUTPATIENT)
Dept: FAMILY MEDICINE CLINIC | Age: 84
End: 2024-02-27

## 2024-02-27 DIAGNOSIS — B37.2 CANDIDAL SKIN INFECTION: Primary | ICD-10-CM

## 2024-02-27 RX ORDER — NYSTATIN 100000 [USP'U]/G
POWDER TOPICAL
Qty: 60 G | Refills: 0 | Status: SHIPPED | OUTPATIENT
Start: 2024-02-27

## 2024-03-28 ENCOUNTER — OFFICE VISIT (OUTPATIENT)
Dept: FAMILY MEDICINE CLINIC | Age: 84
End: 2024-03-28
Payer: MEDICARE

## 2024-03-28 VITALS
HEART RATE: 58 BPM | RESPIRATION RATE: 20 BRPM | DIASTOLIC BLOOD PRESSURE: 74 MMHG | OXYGEN SATURATION: 94 % | WEIGHT: 164.8 LBS | HEIGHT: 63 IN | SYSTOLIC BLOOD PRESSURE: 124 MMHG | BODY MASS INDEX: 29.2 KG/M2

## 2024-03-28 DIAGNOSIS — F13.932 BENZODIAZEPINE WITHDRAWAL WITH PERCEPTUAL DISTURBANCE (HCC): ICD-10-CM

## 2024-03-28 DIAGNOSIS — F41.9 ANXIETY: ICD-10-CM

## 2024-03-28 DIAGNOSIS — B37.2 CANDIDAL SKIN INFECTION: ICD-10-CM

## 2024-03-28 DIAGNOSIS — I10 ESSENTIAL HYPERTENSION: ICD-10-CM

## 2024-03-28 DIAGNOSIS — G25.0 BENIGN ESSENTIAL TREMOR: ICD-10-CM

## 2024-03-28 DIAGNOSIS — J41.0 SIMPLE CHRONIC BRONCHITIS (HCC): Primary | ICD-10-CM

## 2024-03-28 PROCEDURE — 3023F SPIROM DOC REV: CPT | Performed by: STUDENT IN AN ORGANIZED HEALTH CARE EDUCATION/TRAINING PROGRAM

## 2024-03-28 PROCEDURE — G8484 FLU IMMUNIZE NO ADMIN: HCPCS | Performed by: STUDENT IN AN ORGANIZED HEALTH CARE EDUCATION/TRAINING PROGRAM

## 2024-03-28 PROCEDURE — 3078F DIAST BP <80 MM HG: CPT | Performed by: STUDENT IN AN ORGANIZED HEALTH CARE EDUCATION/TRAINING PROGRAM

## 2024-03-28 PROCEDURE — G8417 CALC BMI ABV UP PARAM F/U: HCPCS | Performed by: STUDENT IN AN ORGANIZED HEALTH CARE EDUCATION/TRAINING PROGRAM

## 2024-03-28 PROCEDURE — G8427 DOCREV CUR MEDS BY ELIG CLIN: HCPCS | Performed by: STUDENT IN AN ORGANIZED HEALTH CARE EDUCATION/TRAINING PROGRAM

## 2024-03-28 PROCEDURE — G8399 PT W/DXA RESULTS DOCUMENT: HCPCS | Performed by: STUDENT IN AN ORGANIZED HEALTH CARE EDUCATION/TRAINING PROGRAM

## 2024-03-28 PROCEDURE — 1123F ACP DISCUSS/DSCN MKR DOCD: CPT | Performed by: STUDENT IN AN ORGANIZED HEALTH CARE EDUCATION/TRAINING PROGRAM

## 2024-03-28 PROCEDURE — 1036F TOBACCO NON-USER: CPT | Performed by: STUDENT IN AN ORGANIZED HEALTH CARE EDUCATION/TRAINING PROGRAM

## 2024-03-28 PROCEDURE — 1090F PRES/ABSN URINE INCON ASSESS: CPT | Performed by: STUDENT IN AN ORGANIZED HEALTH CARE EDUCATION/TRAINING PROGRAM

## 2024-03-28 PROCEDURE — 99214 OFFICE O/P EST MOD 30 MIN: CPT | Performed by: STUDENT IN AN ORGANIZED HEALTH CARE EDUCATION/TRAINING PROGRAM

## 2024-03-28 PROCEDURE — 3074F SYST BP LT 130 MM HG: CPT | Performed by: STUDENT IN AN ORGANIZED HEALTH CARE EDUCATION/TRAINING PROGRAM

## 2024-03-28 RX ORDER — NYSTATIN 100000 [USP'U]/G
POWDER TOPICAL
Qty: 60 G | Refills: 0 | Status: SHIPPED | OUTPATIENT
Start: 2024-03-28

## 2024-03-28 ASSESSMENT — PATIENT HEALTH QUESTIONNAIRE - PHQ9
SUM OF ALL RESPONSES TO PHQ QUESTIONS 1-9: 0
SUM OF ALL RESPONSES TO PHQ QUESTIONS 1-9: 0
7. TROUBLE CONCENTRATING ON THINGS, SUCH AS READING THE NEWSPAPER OR WATCHING TELEVISION: NOT AT ALL
SUM OF ALL RESPONSES TO PHQ QUESTIONS 1-9: 0
6. FEELING BAD ABOUT YOURSELF - OR THAT YOU ARE A FAILURE OR HAVE LET YOURSELF OR YOUR FAMILY DOWN: NOT AT ALL
SUM OF ALL RESPONSES TO PHQ9 QUESTIONS 1 & 2: 0
10. IF YOU CHECKED OFF ANY PROBLEMS, HOW DIFFICULT HAVE THESE PROBLEMS MADE IT FOR YOU TO DO YOUR WORK, TAKE CARE OF THINGS AT HOME, OR GET ALONG WITH OTHER PEOPLE: NOT DIFFICULT AT ALL
8. MOVING OR SPEAKING SO SLOWLY THAT OTHER PEOPLE COULD HAVE NOTICED. OR THE OPPOSITE, BEING SO FIGETY OR RESTLESS THAT YOU HAVE BEEN MOVING AROUND A LOT MORE THAN USUAL: NOT AT ALL
2. FEELING DOWN, DEPRESSED OR HOPELESS: NOT AT ALL
1. LITTLE INTEREST OR PLEASURE IN DOING THINGS: NOT AT ALL
5. POOR APPETITE OR OVEREATING: NOT AT ALL
SUM OF ALL RESPONSES TO PHQ QUESTIONS 1-9: 0
9. THOUGHTS THAT YOU WOULD BE BETTER OFF DEAD, OR OF HURTING YOURSELF: NOT AT ALL
4. FEELING TIRED OR HAVING LITTLE ENERGY: NOT AT ALL
3. TROUBLE FALLING OR STAYING ASLEEP: NOT AT ALL

## 2024-03-28 NOTE — PROGRESS NOTES
MG tablet TAKE 1 TABLET BY MOUTH EVERY 7 DAYS WITH A FULL GLASS OF WATER UPON ARISING FOR THE DAY ON AN EMPTY STOMACH AT LEAST 30 MINUTES BEFORE THE FIRST FOOD,YVONNE, OR MED. STAY UPRIGHT FOR AT LEAST 30 MINUTES 12 tablet 1    cetirizine (ZYRTEC) 5 MG tablet TAKE 1 TABLET BY MOUTH EVERY DAY 90 tablet 1    valsartan-hydroCHLOROthiazide (DIOVAN-HCT) 320-12.5 MG per tablet TAKE 1 TABLET BY MOUTH EVERY DAY 90 tablet 1    budesonide-formoterol (SYMBICORT) 160-4.5 MCG/ACT AERO Inhale 2 puffs into the lungs in the morning and 2 puffs in the evening. 10.2 g 0    vitamin C (ASCORBIC ACID) 500 MG tablet Take 1 tablet by mouth daily      Cholecalciferol (VITAMIN D3) 125 MCG (5000 UT) TABS Take by mouth      calcium carbonate-vitamin D (CALTRATE 600+D) 600-400 MG-UNIT TABS per tab Take 1 tablet by mouth 2 times daily. 60 tablet 11    ezetimibe (ZETIA) 10 MG tablet TAKE 1 TABLET BY MOUTH EVERY DAY 90 tablet 0    atenolol (TENORMIN) 50 MG tablet TAKE 1/2 TABLET TWICE A DAY BY MOUTH 90 tablet 1    sertraline (ZOLOFT) 50 MG tablet TAKE 1 TABLET BY MOUTH EVERY DAY 90 tablet 0     No current facility-administered medications for this visit.       ALLERGIES  Allergies   Allergen Reactions    Inderal [Propranolol Hcl]     Statins      Muscle aches    Tramadol Other (See Comments)     headache       PHYSICAL EXAM    /74 (Site: Left Upper Arm, Position: Sitting, Cuff Size: Medium Adult)   Pulse 58   Resp 20   Ht 1.6 m (5' 2.99\")   Wt 74.8 kg (164 lb 12.8 oz)   SpO2 94%   BMI 29.20 kg/m²     Physical Exam  Constitutional:       Appearance: Normal appearance.   HENT:      Head: Normocephalic and atraumatic.   Eyes:      Extraocular Movements: Extraocular movements intact.      Pupils: Pupils are equal, round, and reactive to light.   Cardiovascular:      Rate and Rhythm: Normal rate and regular rhythm.      Pulses: Normal pulses.      Heart sounds: No murmur heard.     No friction rub. No gallop.   Pulmonary:      Effort:

## 2024-04-02 ENCOUNTER — TELEPHONE (OUTPATIENT)
Dept: FAMILY MEDICINE CLINIC | Age: 84
End: 2024-04-02

## 2024-04-02 RX ORDER — AZITHROMYCIN 250 MG/1
TABLET, FILM COATED ORAL
Qty: 6 TABLET | Refills: 0 | Status: SHIPPED | OUTPATIENT
Start: 2024-04-02 | End: 2024-04-12

## 2024-04-10 DIAGNOSIS — I10 PRIMARY HYPERTENSION: ICD-10-CM

## 2024-04-10 DIAGNOSIS — E78.5 HYPERLIPIDEMIA, UNSPECIFIED HYPERLIPIDEMIA TYPE: ICD-10-CM

## 2024-04-11 RX ORDER — ATENOLOL 50 MG/1
TABLET ORAL
Qty: 90 TABLET | Refills: 1 | Status: SHIPPED | OUTPATIENT
Start: 2024-04-11

## 2024-04-11 RX ORDER — EZETIMIBE 10 MG/1
TABLET ORAL
Qty: 90 TABLET | Refills: 0 | Status: SHIPPED | OUTPATIENT
Start: 2024-04-11

## 2024-04-15 ASSESSMENT — ENCOUNTER SYMPTOMS
WHEEZING: 0
ABDOMINAL PAIN: 0
SHORTNESS OF BREATH: 0
NAUSEA: 0
SORE THROAT: 0

## 2024-04-22 DIAGNOSIS — F41.9 ANXIETY: ICD-10-CM

## 2024-04-23 RX ORDER — LORAZEPAM 0.5 MG/1
TABLET ORAL
Qty: 30 TABLET | Refills: 2 | Status: SHIPPED | OUTPATIENT
Start: 2024-04-23 | End: 2024-05-23

## 2024-05-10 DIAGNOSIS — I10 PRIMARY HYPERTENSION: ICD-10-CM

## 2024-05-10 RX ORDER — VALSARTAN AND HYDROCHLOROTHIAZIDE 320; 12.5 MG/1; MG/1
TABLET, FILM COATED ORAL
Qty: 90 TABLET | Refills: 1 | Status: SHIPPED | OUTPATIENT
Start: 2024-05-10

## 2024-05-17 DIAGNOSIS — B37.2 CANDIDAL SKIN INFECTION: ICD-10-CM

## 2024-05-17 RX ORDER — NYSTATIN 100000 [USP'U]/G
POWDER TOPICAL
Qty: 60 G | Refills: 0 | Status: SHIPPED | OUTPATIENT
Start: 2024-05-17

## 2024-05-27 DIAGNOSIS — J01.10 ACUTE NON-RECURRENT FRONTAL SINUSITIS: ICD-10-CM

## 2024-05-28 RX ORDER — CETIRIZINE HYDROCHLORIDE 5 MG/1
TABLET ORAL
Qty: 90 TABLET | Refills: 1 | Status: SHIPPED | OUTPATIENT
Start: 2024-05-28

## 2024-06-27 ENCOUNTER — TELEMEDICINE (OUTPATIENT)
Dept: FAMILY MEDICINE CLINIC | Age: 84
End: 2024-06-27

## 2024-06-27 DIAGNOSIS — F41.9 ANXIETY: Primary | ICD-10-CM

## 2024-06-27 DIAGNOSIS — Z00.00 MEDICARE ANNUAL WELLNESS VISIT, SUBSEQUENT: Primary | ICD-10-CM

## 2024-06-27 SDOH — ECONOMIC STABILITY: FOOD INSECURITY: WITHIN THE PAST 12 MONTHS, YOU WORRIED THAT YOUR FOOD WOULD RUN OUT BEFORE YOU GOT MONEY TO BUY MORE.: NEVER TRUE

## 2024-06-27 SDOH — ECONOMIC STABILITY: HOUSING INSECURITY
IN THE LAST 12 MONTHS, WAS THERE A TIME WHEN YOU DID NOT HAVE A STEADY PLACE TO SLEEP OR SLEPT IN A SHELTER (INCLUDING NOW)?: NO

## 2024-06-27 SDOH — ECONOMIC STABILITY: FOOD INSECURITY: WITHIN THE PAST 12 MONTHS, THE FOOD YOU BOUGHT JUST DIDN'T LAST AND YOU DIDN'T HAVE MONEY TO GET MORE.: NEVER TRUE

## 2024-06-27 SDOH — ECONOMIC STABILITY: INCOME INSECURITY: HOW HARD IS IT FOR YOU TO PAY FOR THE VERY BASICS LIKE FOOD, HOUSING, MEDICAL CARE, AND HEATING?: NOT HARD AT ALL

## 2024-06-27 ASSESSMENT — PATIENT HEALTH QUESTIONNAIRE - PHQ9
8. MOVING OR SPEAKING SO SLOWLY THAT OTHER PEOPLE COULD HAVE NOTICED. OR THE OPPOSITE, BEING SO FIGETY OR RESTLESS THAT YOU HAVE BEEN MOVING AROUND A LOT MORE THAN USUAL: NOT AT ALL
10. IF YOU CHECKED OFF ANY PROBLEMS, HOW DIFFICULT HAVE THESE PROBLEMS MADE IT FOR YOU TO DO YOUR WORK, TAKE CARE OF THINGS AT HOME, OR GET ALONG WITH OTHER PEOPLE: NOT DIFFICULT AT ALL
5. POOR APPETITE OR OVEREATING: NOT AT ALL
1. LITTLE INTEREST OR PLEASURE IN DOING THINGS: NOT AT ALL
SUM OF ALL RESPONSES TO PHQ QUESTIONS 1-9: 0
7. TROUBLE CONCENTRATING ON THINGS, SUCH AS READING THE NEWSPAPER OR WATCHING TELEVISION: NOT AT ALL
SUM OF ALL RESPONSES TO PHQ9 QUESTIONS 1 & 2: 0
4. FEELING TIRED OR HAVING LITTLE ENERGY: NOT AT ALL
SUM OF ALL RESPONSES TO PHQ QUESTIONS 1-9: 0
SUM OF ALL RESPONSES TO PHQ QUESTIONS 1-9: 0
9. THOUGHTS THAT YOU WOULD BE BETTER OFF DEAD, OR OF HURTING YOURSELF: NOT AT ALL
3. TROUBLE FALLING OR STAYING ASLEEP: NOT AT ALL
SUM OF ALL RESPONSES TO PHQ QUESTIONS 1-9: 0
6. FEELING BAD ABOUT YOURSELF - OR THAT YOU ARE A FAILURE OR HAVE LET YOURSELF OR YOUR FAMILY DOWN: NOT AT ALL
2. FEELING DOWN, DEPRESSED OR HOPELESS: NOT AT ALL

## 2024-06-27 ASSESSMENT — LIFESTYLE VARIABLES
HOW MANY STANDARD DRINKS CONTAINING ALCOHOL DO YOU HAVE ON A TYPICAL DAY: PATIENT DOES NOT DRINK
HOW OFTEN DO YOU HAVE A DRINK CONTAINING ALCOHOL: NEVER

## 2024-06-27 NOTE — PROGRESS NOTES
Medicare Annual Wellness Visit    Lubna Williamson is here for Medicare AWV    Assessment & Plan   Medicare annual wellness visit, subsequent  Recommendations for Preventive Services Due: see orders and patient instructions/AVS.  Recommended screening schedule for the next 5-10 years is provided to the patient in written form: see Patient Instructions/AVS.     No follow-ups on file.     Subjective       Patient's complete Health Risk Assessment and screening values have been reviewed and are found in Flowsheets. The following problems were reviewed today and where indicated follow up appointments were made and/or referrals ordered.    Positive Risk Factor Screenings with Interventions:    Fall Risk:  Do you feel unsteady or are you worried about falling? : (!) yes (walker always)  2 or more falls in past year?: no  Fall with injury in past year?: (!) yes     Interventions:    Patient comments: patient states she always uses a walker for stability since she fell and hit her head back in October 2023.  Reviewed medications, home hazards, visual acuity, and co-morbidities that can increase risk for falls  Patient declines any further evaluation or treatment            General HRA Questions:  Select all that apply: (!) Social Isolation (has missed going to [place] since her fall in Oct 2023)    Social Isolation Interventions:  Patient comments: patient states she has not gone to [place] since October 2023, which is when she had a bad fall and hit her head. She states there are stairs she would need to use to get into her [place], and she is not able to do stairs at this time. States she is doing much better though.  Patient declined any further interventions or treatment        Hearing Screen:  Do you or your family notice any trouble with your hearing that hasn't been managed with hearing aids?: (!) Yes (has had trouble w/ hearing since she fell in 10/23)    Interventions:  Patient comments: states she has had some trouble

## 2024-06-27 NOTE — PATIENT INSTRUCTIONS
Personalized Preventive Plan for Lubna Williamson - 6/27/2024  Medicare offers a range of preventive health benefits. Some of the tests and screenings are paid in full while other may be subject to a deductible, co-insurance, and/or copay.    Some of these benefits include a comprehensive review of your medical history including lifestyle, illnesses that may run in your family, and various assessments and screenings as appropriate.    After reviewing your medical record and screening and assessments performed today your provider may have ordered immunizations, labs, imaging, and/or referrals for you.  A list of these orders (if applicable) as well as your Preventive Care list are included within your After Visit Summary for your review.    Other Preventive Recommendations:    A preventive eye exam performed by an eye specialist is recommended every 1-2 years to screen for glaucoma; cataracts, macular degeneration, and other eye disorders.  A preventive dental visit is recommended every 6 months.  Try to get at least 150 minutes of exercise per week or 10,000 steps per day on a pedometer .  Order or download the FREE \"Exercise & Physical Activity: Your Everyday Guide\" from [employer] [place] [employer]. Call 1-548.549.1522 or search [employer] [place] [employer] online.  You need 2654-4074 mg of calcium and 2411-9673 IU of vitamin D per day. It is possible to meet your calcium requirement with diet alone, but a vitamin D supplement is usually necessary to meet this goal.  When exposed to the sun, use a sunscreen that protects against both UVA and UVB radiation with an SPF of 30 or greater. Reapply every 2 to 3 hours or after sweating, drying off with a towel, or swimming.  Always wear a seat belt when traveling in a car. Always wear a helmet when riding a bicycle or motorcycle.

## 2024-07-06 DIAGNOSIS — E78.5 HYPERLIPIDEMIA, UNSPECIFIED HYPERLIPIDEMIA TYPE: ICD-10-CM

## 2024-07-08 RX ORDER — EZETIMIBE 10 MG/1
TABLET ORAL
Qty: 90 TABLET | Refills: 0 | Status: SHIPPED | OUTPATIENT
Start: 2024-07-08

## 2024-07-09 DIAGNOSIS — B37.2 CANDIDAL SKIN INFECTION: ICD-10-CM

## 2024-07-09 RX ORDER — NYSTATIN 100000 [USP'U]/G
POWDER TOPICAL
Qty: 60 G | Refills: 0 | Status: SHIPPED | OUTPATIENT
Start: 2024-07-09

## 2024-07-12 DIAGNOSIS — B37.2 CANDIDAL SKIN INFECTION: Primary | ICD-10-CM

## 2024-07-12 RX ORDER — CLOTRIMAZOLE AND BETAMETHASONE DIPROPIONATE 10; .64 MG/G; MG/G
CREAM TOPICAL
Qty: 45 G | Refills: 0 | Status: SHIPPED | OUTPATIENT
Start: 2024-07-12

## 2024-07-22 DIAGNOSIS — F41.9 ANXIETY: ICD-10-CM

## 2024-07-22 RX ORDER — LORAZEPAM 0.5 MG/1
0.5 TABLET ORAL 2 TIMES DAILY PRN
Qty: 30 TABLET | Refills: 0 | Status: SHIPPED | OUTPATIENT
Start: 2024-07-22 | End: 2024-08-21

## 2024-08-01 ENCOUNTER — OFFICE VISIT (OUTPATIENT)
Dept: FAMILY MEDICINE CLINIC | Age: 84
End: 2024-08-01

## 2024-08-01 VITALS
OXYGEN SATURATION: 96 % | HEIGHT: 63 IN | HEART RATE: 55 BPM | DIASTOLIC BLOOD PRESSURE: 74 MMHG | WEIGHT: 162 LBS | SYSTOLIC BLOOD PRESSURE: 140 MMHG | BODY MASS INDEX: 28.7 KG/M2

## 2024-08-01 DIAGNOSIS — F13.932 BENZODIAZEPINE WITHDRAWAL WITH PERCEPTUAL DISTURBANCE (HCC): ICD-10-CM

## 2024-08-01 DIAGNOSIS — N18.31 STAGE 3A CHRONIC KIDNEY DISEASE (HCC): ICD-10-CM

## 2024-08-01 DIAGNOSIS — I10 PRIMARY HYPERTENSION: ICD-10-CM

## 2024-08-01 DIAGNOSIS — F41.9 ANXIETY: ICD-10-CM

## 2024-08-01 DIAGNOSIS — G25.0 BENIGN ESSENTIAL TREMOR: Primary | ICD-10-CM

## 2024-08-01 PROBLEM — G40.89 OTHER SEIZURES (HCC): Status: ACTIVE | Noted: 2024-08-01

## 2024-08-01 ASSESSMENT — ENCOUNTER SYMPTOMS
WHEEZING: 0
DIARRHEA: 0
CONSTIPATION: 0
ABDOMINAL PAIN: 0
COLOR CHANGE: 0
VOMITING: 0
NAUSEA: 0
SHORTNESS OF BREATH: 0
BLOOD IN STOOL: 0
ABDOMINAL DISTENTION: 0

## 2024-08-01 NOTE — PATIENT INSTRUCTIONS
Lab orders in system- no appointment or paperwork needed.     10-12 hours fasting for lab work (water and black coffee only prior to lab work)    [facility] Hours  [address]   [place], [place] [address]    Hours  Monday-Friday 7am-5pm  Saturday  8am-12pm

## 2024-08-01 NOTE — PROGRESS NOTES
tablets by mouth daily  Dispense: 135 tablet; Refill: 1    3. Benzodiazepine withdrawal with perceptual disturbance (HCC)    4. Primary hypertension    5. Stage 3a chronic kidney disease (HCC)    We did discuss various options for treatment of essential tremor but ultimately patient politely declined at this time.  Anxiety symptoms not entirely at goal.  Discussion with patient regarding increasing sertraline of which she was very agreeable.  Blood pressure well-controlled on current regimen.    The patient has been prescribed controlled substance medication for management of moderate to severe chronic anxiety symptoms. The patient is using their controlled substance medication appropriately to increase activity and functional capacity both mentally and physically. There are no signs of addiction or diversion. There are no side effects reported. [place]ARRS report was reviewed and no abnormalities were identified. Medications were refilled.     Return in about 4 months (around 12/1/2024).          Plan of care reviewed with patient as described above, all questions answered at this time. Patient agreeable with plan of care.       Electronically signed by JULIETTE Coronado CNP on 8/1/2024

## 2024-08-13 DIAGNOSIS — F41.9 ANXIETY: ICD-10-CM

## 2024-08-14 RX ORDER — LORAZEPAM 0.5 MG/1
0.5 TABLET ORAL 2 TIMES DAILY
Qty: 30 TABLET | Refills: 0 | Status: SHIPPED | OUTPATIENT
Start: 2024-08-14 | End: 2024-08-29

## 2024-09-03 ENCOUNTER — OFFICE VISIT (OUTPATIENT)
Dept: FAMILY MEDICINE CLINIC | Age: 84
End: 2024-09-03
Payer: MEDICARE

## 2024-09-03 VITALS
SYSTOLIC BLOOD PRESSURE: 140 MMHG | HEART RATE: 53 BPM | TEMPERATURE: 97 F | BODY MASS INDEX: 28.7 KG/M2 | DIASTOLIC BLOOD PRESSURE: 88 MMHG | OXYGEN SATURATION: 94 % | WEIGHT: 162 LBS | HEIGHT: 63 IN

## 2024-09-03 DIAGNOSIS — H66.92 ACUTE INFECTION OF LEFT EAR: ICD-10-CM

## 2024-09-03 DIAGNOSIS — J01.90 ACUTE SINUSITIS WITH SYMPTOMS > 10 DAYS: Primary | ICD-10-CM

## 2024-09-03 DIAGNOSIS — J44.1 COPD WITH ACUTE EXACERBATION (HCC): ICD-10-CM

## 2024-09-03 PROCEDURE — G8417 CALC BMI ABV UP PARAM F/U: HCPCS | Performed by: PHYSICIAN ASSISTANT

## 2024-09-03 PROCEDURE — 3077F SYST BP >= 140 MM HG: CPT | Performed by: PHYSICIAN ASSISTANT

## 2024-09-03 PROCEDURE — G8427 DOCREV CUR MEDS BY ELIG CLIN: HCPCS | Performed by: PHYSICIAN ASSISTANT

## 2024-09-03 PROCEDURE — 1123F ACP DISCUSS/DSCN MKR DOCD: CPT | Performed by: PHYSICIAN ASSISTANT

## 2024-09-03 PROCEDURE — 99213 OFFICE O/P EST LOW 20 MIN: CPT | Performed by: PHYSICIAN ASSISTANT

## 2024-09-03 PROCEDURE — G8399 PT W/DXA RESULTS DOCUMENT: HCPCS | Performed by: PHYSICIAN ASSISTANT

## 2024-09-03 PROCEDURE — 3079F DIAST BP 80-89 MM HG: CPT | Performed by: PHYSICIAN ASSISTANT

## 2024-09-03 PROCEDURE — 3023F SPIROM DOC REV: CPT | Performed by: PHYSICIAN ASSISTANT

## 2024-09-03 PROCEDURE — 1090F PRES/ABSN URINE INCON ASSESS: CPT | Performed by: PHYSICIAN ASSISTANT

## 2024-09-03 PROCEDURE — 1036F TOBACCO NON-USER: CPT | Performed by: PHYSICIAN ASSISTANT

## 2024-09-03 RX ORDER — METHYLPREDNISOLONE 4 MG
TABLET, DOSE PACK ORAL
Qty: 1 KIT | Refills: 0 | Status: SHIPPED | OUTPATIENT
Start: 2024-09-03

## 2024-09-03 NOTE — PROGRESS NOTES
rate, normal rhythm, no murmurs, gallops or rubs  Thorax & Lungs:  Normal breath sounds, no respiratory distress, no wheezing, no rales, no rhonchi  Skin:  Warm, dry, no erythema, no rash  Neurologic:  Alert & oriented   Psychiatric:  Affect normal, mood normal    ASSESSMENT & PLAN    Lubna was seen today for sinus congestion.    Diagnoses and all orders for this visit:    Acute sinusitis with symptoms > 10 days  -     amoxicillin-clavulanate (AUGMENTIN) 875-125 MG per tablet; Take 1 tablet by mouth 2 times daily for 10 days    Acute infection of left ear  -     amoxicillin-clavulanate (AUGMENTIN) 875-125 MG per tablet; Take 1 tablet by mouth 2 times daily for 10 days    COPD with acute exacerbation (HCC)  -     methylPREDNISolone (MEDROL, DIOGENES,) 4 MG tablet; Use as directed       Initiate Augmentin twice daily for the next 10 days, take with food.  Patient has also been using albuterol more since illness developed.  She has known COPD.  Will initiate Medrol Dosepak to cover for onset of COPD exacerbation.  Rest and fluids encouraged.  She is to contact the office if symptoms do not improve within the next 7 to 10 days as anticipated, sooner for any worsening.    There are no discontinued medications.     No follow-ups on file.     Plan of care reviewed with patient who verbalizes understanding and wishes to continue.   Patient to call with any questions or concerns.                 Please note that this chart was generated using dragon dictation software.  Although every effort was made to ensure the accuracy of this automated transcription, some errors in transcription may have occurred.    Electronically signed by FLORINDA CHAUHAN PA-C on 9/3/2024

## 2024-09-16 ENCOUNTER — TELEPHONE (OUTPATIENT)
Dept: FAMILY MEDICINE CLINIC | Age: 84
End: 2024-09-16

## 2024-09-16 DIAGNOSIS — I10 PRIMARY HYPERTENSION: ICD-10-CM

## 2024-09-16 DIAGNOSIS — F41.9 ANXIETY: ICD-10-CM

## 2024-09-16 RX ORDER — LORAZEPAM 0.5 MG/1
0.5 TABLET ORAL 2 TIMES DAILY
Qty: 30 TABLET | Refills: 0 | Status: SHIPPED | OUTPATIENT
Start: 2024-09-16 | End: 2024-10-01

## 2024-09-16 RX ORDER — ATENOLOL 50 MG/1
25 TABLET ORAL 2 TIMES DAILY
Qty: 90 TABLET | Refills: 1 | Status: SHIPPED | OUTPATIENT
Start: 2024-09-16

## 2024-10-03 DIAGNOSIS — E78.5 HYPERLIPIDEMIA, UNSPECIFIED HYPERLIPIDEMIA TYPE: ICD-10-CM

## 2024-10-03 RX ORDER — EZETIMIBE 10 MG/1
TABLET ORAL
Qty: 90 TABLET | Refills: 0 | Status: SHIPPED | OUTPATIENT
Start: 2024-10-03

## 2024-10-08 DIAGNOSIS — F41.9 ANXIETY: ICD-10-CM

## 2024-10-08 RX ORDER — LORAZEPAM 0.5 MG/1
0.5 TABLET ORAL 2 TIMES DAILY
Qty: 30 TABLET | Refills: 0 | Status: SHIPPED | OUTPATIENT
Start: 2024-10-08 | End: 2024-10-23

## 2024-11-03 DIAGNOSIS — I10 PRIMARY HYPERTENSION: ICD-10-CM

## 2024-11-04 DIAGNOSIS — F41.9 ANXIETY: ICD-10-CM

## 2024-11-05 DIAGNOSIS — F41.9 ANXIETY: ICD-10-CM

## 2024-11-05 RX ORDER — LORAZEPAM 0.5 MG/1
0.5 TABLET ORAL 2 TIMES DAILY
Qty: 30 TABLET | Refills: 0 | OUTPATIENT
Start: 2024-11-05 | End: 2024-11-20

## 2024-11-05 RX ORDER — VALSARTAN AND HYDROCHLOROTHIAZIDE 320; 12.5 MG/1; MG/1
TABLET, FILM COATED ORAL
Qty: 90 TABLET | Refills: 1 | Status: SHIPPED | OUTPATIENT
Start: 2024-11-05

## 2024-11-08 RX ORDER — LORAZEPAM 0.5 MG/1
0.5 TABLET ORAL 2 TIMES DAILY
Qty: 30 TABLET | Refills: 0 | Status: SHIPPED | OUTPATIENT
Start: 2024-11-08 | End: 2024-11-23

## 2024-12-02 ENCOUNTER — OFFICE VISIT (OUTPATIENT)
Dept: FAMILY MEDICINE CLINIC | Age: 84
End: 2024-12-02

## 2024-12-02 VITALS
DIASTOLIC BLOOD PRESSURE: 60 MMHG | WEIGHT: 159.2 LBS | HEIGHT: 63 IN | BODY MASS INDEX: 28.21 KG/M2 | HEART RATE: 54 BPM | OXYGEN SATURATION: 94 % | SYSTOLIC BLOOD PRESSURE: 128 MMHG

## 2024-12-02 DIAGNOSIS — J96.01 ACUTE RESPIRATORY FAILURE WITH HYPOXIA: ICD-10-CM

## 2024-12-02 DIAGNOSIS — G31.84 MILD COGNITIVE IMPAIRMENT: ICD-10-CM

## 2024-12-02 DIAGNOSIS — I10 ESSENTIAL HYPERTENSION: ICD-10-CM

## 2024-12-02 DIAGNOSIS — Z23 NEED FOR PROPHYLACTIC VACCINATION AND INOCULATION AGAINST INFLUENZA: Primary | ICD-10-CM

## 2024-12-02 DIAGNOSIS — F41.9 ANXIETY: ICD-10-CM

## 2024-12-02 DIAGNOSIS — G40.89 OTHER SEIZURES (HCC): ICD-10-CM

## 2024-12-02 DIAGNOSIS — G80.4 ATAXIC CEREBRAL PALSY (HCC): ICD-10-CM

## 2024-12-02 DIAGNOSIS — G25.2 RESTING TREMOR: ICD-10-CM

## 2024-12-02 RX ORDER — LORAZEPAM 0.5 MG/1
0.5 TABLET ORAL DAILY PRN
Qty: 30 TABLET | Refills: 0 | Status: SHIPPED | OUTPATIENT
Start: 2024-12-06 | End: 2025-01-05

## 2024-12-02 NOTE — PROGRESS NOTES
12/2/2024    Lubna Williamson    Chief Complaint   Patient presents with    4 month follow up    Fall     Pt states to having a fall about about month ago     Flu Vaccine    Wrist Pain     PT states pain in wrist since fall last month        HPI  Lubna is a 84 y.o. female with a PMHx as listed below who presents today for patient. 6 month follow up. No acute complaints.     Patient fell 1 month ago. In bathroom, patient is not able to use walker in bathroom.     COPD fine with current inhalers.   History of Present Illness        1. Need for prophylactic vaccination and inoculation against influenza    2. Acute respiratory failure with hypoxia    3. Ataxic cerebral palsy (HCC)    4. Other seizures (HCC)             REVIEW OF SYMPTOMS    Review of Systems   Constitutional:  Negative for chills and fatigue.   HENT:  Negative for congestion and sore throat.    Respiratory:  Negative for shortness of breath and wheezing.    Cardiovascular:  Negative for chest pain and palpitations.   Gastrointestinal:  Negative for abdominal pain and nausea.   Genitourinary:  Negative for frequency and urgency.   Neurological:  Negative for light-headedness.       PAST MEDICAL HISTORY  Past Medical History:   Diagnosis Date    Anxiety     Cardiomegaly     Cerebral palsy (HCC)     Chronic bronchitis (HCC)     Compression fracture 2/2013    age of onset unknown    COPD (chronic obstructive pulmonary disease) (HCC)     Depression     Environmental allergies     Essential hypertension, benign 1985    white coat hypertension    Family history of tremor     GERD (gastroesophageal reflux disease)     Hyperlipemia     dx'd 1985    Impaired glucose tolerance     Impaired glucose tolerance     Lichen sclerosus     Mild mitral regurgitation     Mild tricuspid regurgitation     per 7/2014 echo    Moderate aortic insufficiency     Osteoarthritis of lumbar spine     Osteoarthritis of thoracic spine     Osteoporosis     Sleep apnea     Vertigo

## 2024-12-16 ASSESSMENT — ENCOUNTER SYMPTOMS
WHEEZING: 0
SHORTNESS OF BREATH: 0
ABDOMINAL PAIN: 0
SORE THROAT: 0
NAUSEA: 0

## 2025-01-01 DIAGNOSIS — E78.5 HYPERLIPIDEMIA, UNSPECIFIED HYPERLIPIDEMIA TYPE: ICD-10-CM

## 2025-01-02 RX ORDER — EZETIMIBE 10 MG/1
TABLET ORAL
Qty: 90 TABLET | Refills: 1 | Status: SHIPPED | OUTPATIENT
Start: 2025-01-02

## 2025-01-06 DIAGNOSIS — F41.9 ANXIETY: ICD-10-CM

## 2025-01-06 RX ORDER — LORAZEPAM 0.5 MG/1
0.5 TABLET ORAL DAILY PRN
Qty: 30 TABLET | Refills: 0 | Status: SHIPPED | OUTPATIENT
Start: 2025-01-06 | End: 2025-02-05

## 2025-02-11 ENCOUNTER — OFFICE VISIT (OUTPATIENT)
Dept: FAMILY MEDICINE CLINIC | Age: 85
End: 2025-02-11

## 2025-02-11 VITALS
HEIGHT: 63 IN | DIASTOLIC BLOOD PRESSURE: 60 MMHG | WEIGHT: 168 LBS | SYSTOLIC BLOOD PRESSURE: 148 MMHG | HEART RATE: 60 BPM | OXYGEN SATURATION: 93 % | BODY MASS INDEX: 29.77 KG/M2

## 2025-02-11 DIAGNOSIS — M65.311 TRIGGER FINGER OF ALL DIGITS OF BOTH HANDS: ICD-10-CM

## 2025-02-11 DIAGNOSIS — M65.332 TRIGGER FINGER OF ALL DIGITS OF BOTH HANDS: ICD-10-CM

## 2025-02-11 DIAGNOSIS — M65.352 TRIGGER FINGER OF ALL DIGITS OF BOTH HANDS: ICD-10-CM

## 2025-02-11 DIAGNOSIS — G25.0 BENIGN ESSENTIAL TREMOR: Primary | ICD-10-CM

## 2025-02-11 DIAGNOSIS — M65.312 TRIGGER FINGER OF ALL DIGITS OF BOTH HANDS: ICD-10-CM

## 2025-02-11 DIAGNOSIS — M65.341 TRIGGER FINGER OF ALL DIGITS OF BOTH HANDS: ICD-10-CM

## 2025-02-11 DIAGNOSIS — G40.89 OTHER SEIZURES (HCC): ICD-10-CM

## 2025-02-11 DIAGNOSIS — M65.321 TRIGGER FINGER OF ALL DIGITS OF BOTH HANDS: ICD-10-CM

## 2025-02-11 DIAGNOSIS — G80.4 ATAXIC CEREBRAL PALSY (HCC): ICD-10-CM

## 2025-02-11 DIAGNOSIS — M65.322 TRIGGER FINGER OF ALL DIGITS OF BOTH HANDS: ICD-10-CM

## 2025-02-11 DIAGNOSIS — M65.331 TRIGGER FINGER OF ALL DIGITS OF BOTH HANDS: ICD-10-CM

## 2025-02-11 DIAGNOSIS — M65.351 TRIGGER FINGER OF ALL DIGITS OF BOTH HANDS: ICD-10-CM

## 2025-02-11 DIAGNOSIS — F13.932: ICD-10-CM

## 2025-02-11 DIAGNOSIS — F41.9 ANXIETY: ICD-10-CM

## 2025-02-11 DIAGNOSIS — N18.31 STAGE 3A CHRONIC KIDNEY DISEASE (HCC): ICD-10-CM

## 2025-02-11 DIAGNOSIS — M65.342 TRIGGER FINGER OF ALL DIGITS OF BOTH HANDS: ICD-10-CM

## 2025-02-11 PROBLEM — J96.01 ACUTE RESPIRATORY FAILURE WITH HYPOXIA: Status: RESOLVED | Noted: 2023-10-05 | Resolved: 2025-02-11

## 2025-02-11 RX ORDER — SERTRALINE HYDROCHLORIDE 100 MG/1
100 TABLET, FILM COATED ORAL DAILY
Qty: 90 TABLET | Refills: 1 | Status: SHIPPED | OUTPATIENT
Start: 2025-02-11 | End: 2025-08-10

## 2025-02-11 RX ORDER — LORAZEPAM 0.5 MG/1
0.5 TABLET ORAL EVERY 8 HOURS PRN
Qty: 90 TABLET | Refills: 0 | Status: SHIPPED | OUTPATIENT
Start: 2025-02-11 | End: 2025-05-12

## 2025-02-11 ASSESSMENT — ENCOUNTER SYMPTOMS
WHEEZING: 0
SHORTNESS OF BREATH: 0
SORE THROAT: 0
NAUSEA: 0
ABDOMINAL PAIN: 0

## 2025-02-11 NOTE — PROGRESS NOTES
2/11/2025    Lubna Suresh Williamson    Chief Complaint   Patient presents with    Follow-up     4 Month follow up  HCC    Other     Pt stated that after nerves been off for year she been shaky and has dry mouth and throat for about two weeks.       HPI    History of Present Illness  The patient is an 84-year-old female who presents for follow-up on her chronic conditions.    She reports experiencing memory issues, particularly with recalling names, which she attributes to her age. She has been engaging in memory games on her computer and playing the piano as a form of cognitive exercise. She has not been out of the house but one time for ages and is going to try to get out more often. She has not had any recent falls and uses a walker for mobility. She expresses a desire to have her blood sugar levels checked. She also reports difficulty with writing.    She describes a persistent state of nervousness and anxiety, which she manages with sertraline, taking 1.5 tablets daily. She also takes Ativan as needed and is seeking a refill of this medication. She reports that her anxiety is exacerbated by her memory issues and the recent hospitalization of her daughter.    FAMILY HISTORY  Her daughter had neck surgery with 13 screws inserted and subsequently developed an infection.    MEDICATIONS  Sertraline, atenolol, Ativan      No diagnosis found.         REVIEW OF SYMPTOMS    Review of Systems   Constitutional:  Negative for chills and fatigue.   HENT:  Negative for congestion and sore throat.    Respiratory:  Negative for shortness of breath and wheezing.    Cardiovascular:  Negative for chest pain and palpitations.   Gastrointestinal:  Negative for abdominal pain and nausea.   Genitourinary:  Negative for frequency and urgency.   Neurological:  Negative for light-headedness.       PAST MEDICAL HISTORY  Past Medical History:   Diagnosis Date    Anxiety     Cardiomegaly     Cerebral palsy (HCC)     Chronic bronchitis (HCC)

## 2025-03-12 DIAGNOSIS — I10 PRIMARY HYPERTENSION: ICD-10-CM

## 2025-03-12 DIAGNOSIS — F41.9 ANXIETY: ICD-10-CM

## 2025-03-13 RX ORDER — ATENOLOL 50 MG/1
25 TABLET ORAL 2 TIMES DAILY
Qty: 90 TABLET | Refills: 1 | Status: SHIPPED | OUTPATIENT
Start: 2025-03-13

## 2025-04-29 DIAGNOSIS — F41.9 ANXIETY: ICD-10-CM

## 2025-05-01 RX ORDER — LORAZEPAM 0.5 MG/1
0.5 TABLET ORAL EVERY 8 HOURS PRN
Qty: 90 TABLET | Refills: 0 | Status: SHIPPED | OUTPATIENT
Start: 2025-05-01 | End: 2025-05-31

## 2025-05-16 ENCOUNTER — OFFICE VISIT (OUTPATIENT)
Dept: FAMILY MEDICINE CLINIC | Age: 85
End: 2025-05-16

## 2025-05-16 VITALS
BODY MASS INDEX: 29.95 KG/M2 | HEART RATE: 62 BPM | DIASTOLIC BLOOD PRESSURE: 86 MMHG | WEIGHT: 169 LBS | OXYGEN SATURATION: 92 % | SYSTOLIC BLOOD PRESSURE: 148 MMHG | HEIGHT: 63 IN

## 2025-05-16 DIAGNOSIS — R29.6 MULTIPLE FALLS: Primary | ICD-10-CM

## 2025-05-16 DIAGNOSIS — I10 ESSENTIAL HYPERTENSION: ICD-10-CM

## 2025-05-16 DIAGNOSIS — E78.2 MODERATE MIXED HYPERLIPIDEMIA NOT REQUIRING STATIN THERAPY: ICD-10-CM

## 2025-05-16 DIAGNOSIS — R54 AGE-RELATED PHYSICAL DEBILITY: ICD-10-CM

## 2025-05-16 DIAGNOSIS — I10 PRIMARY HYPERTENSION: ICD-10-CM

## 2025-05-16 LAB
ALBUMIN SERPL-MCNC: 4.4 G/DL (ref 3.4–5)
ALBUMIN/GLOB SERPL: 1.8 {RATIO} (ref 1.1–2.2)
ALP SERPL-CCNC: 73 U/L (ref 40–129)
ALT SERPL-CCNC: 25 U/L (ref 10–40)
ANION GAP SERPL CALCULATED.3IONS-SCNC: 10 MMOL/L (ref 3–16)
AST SERPL-CCNC: 35 U/L (ref 15–37)
BASOPHILS # BLD: 0.1 K/UL (ref 0–0.2)
BASOPHILS NFR BLD: 1.1 %
BILIRUB SERPL-MCNC: 0.6 MG/DL (ref 0–1)
BUN SERPL-MCNC: 13 MG/DL (ref 7–20)
CALCIUM SERPL-MCNC: 10 MG/DL (ref 8.3–10.6)
CHLORIDE SERPL-SCNC: 101 MMOL/L (ref 99–110)
CHOLEST SERPL-MCNC: 205 MG/DL (ref 0–199)
CO2 SERPL-SCNC: 26 MMOL/L (ref 21–32)
CREAT SERPL-MCNC: 0.9 MG/DL (ref 0.6–1.2)
DEPRECATED RDW RBC AUTO: 13.1 % (ref 12.4–15.4)
EOSINOPHIL # BLD: 0.3 K/UL (ref 0–0.6)
EOSINOPHIL NFR BLD: 4.4 %
GFR SERPLBLD CREATININE-BSD FMLA CKD-EPI: 63 ML/MIN/{1.73_M2}
GLUCOSE SERPL-MCNC: 118 MG/DL (ref 70–99)
HCT VFR BLD AUTO: 45.5 % (ref 36–48)
HDLC SERPL-MCNC: 37 MG/DL (ref 40–60)
HGB BLD-MCNC: 15.8 G/DL (ref 12–16)
LDLC SERPL CALC-MCNC: 141 MG/DL
LYMPHOCYTES # BLD: 1.1 K/UL (ref 1–5.1)
LYMPHOCYTES NFR BLD: 15.4 %
MCH RBC QN AUTO: 29.1 PG (ref 26–34)
MCHC RBC AUTO-ENTMCNC: 34.8 G/DL (ref 31–36)
MCV RBC AUTO: 83.7 FL (ref 80–100)
MONOCYTES # BLD: 1 K/UL (ref 0–1.3)
MONOCYTES NFR BLD: 12.9 %
NEUTROPHILS # BLD: 4.9 K/UL (ref 1.7–7.7)
NEUTROPHILS NFR BLD: 66.2 %
PLATELET # BLD AUTO: 192 K/UL (ref 135–450)
PMV BLD AUTO: 9.2 FL (ref 5–10.5)
POTASSIUM SERPL-SCNC: 4.2 MMOL/L (ref 3.5–5.1)
PROT SERPL-MCNC: 6.8 G/DL (ref 6.4–8.2)
RBC # BLD AUTO: 5.44 M/UL (ref 4–5.2)
SODIUM SERPL-SCNC: 137 MMOL/L (ref 136–145)
TRIGL SERPL-MCNC: 135 MG/DL (ref 0–150)
TSH SERPL DL<=0.005 MIU/L-ACNC: 2.66 UIU/ML (ref 0.27–4.2)
VLDLC SERPL CALC-MCNC: 27 MG/DL
WBC # BLD AUTO: 7.5 K/UL (ref 4–11)

## 2025-05-16 RX ORDER — ATENOLOL 50 MG/1
TABLET ORAL
Qty: 135 TABLET | Refills: 1 | Status: SHIPPED | OUTPATIENT
Start: 2025-05-16

## 2025-05-16 SDOH — ECONOMIC STABILITY: FOOD INSECURITY: WITHIN THE PAST 12 MONTHS, YOU WORRIED THAT YOUR FOOD WOULD RUN OUT BEFORE YOU GOT MONEY TO BUY MORE.: NEVER TRUE

## 2025-05-16 SDOH — ECONOMIC STABILITY: FOOD INSECURITY: WITHIN THE PAST 12 MONTHS, THE FOOD YOU BOUGHT JUST DIDN'T LAST AND YOU DIDN'T HAVE MONEY TO GET MORE.: NEVER TRUE

## 2025-05-16 ASSESSMENT — PATIENT HEALTH QUESTIONNAIRE - PHQ9
9. THOUGHTS THAT YOU WOULD BE BETTER OFF DEAD, OR OF HURTING YOURSELF: NOT AT ALL
SUM OF ALL RESPONSES TO PHQ QUESTIONS 1-9: 4
5. POOR APPETITE OR OVEREATING: NOT AT ALL
SUM OF ALL RESPONSES TO PHQ QUESTIONS 1-9: 4
3. TROUBLE FALLING OR STAYING ASLEEP: NOT AT ALL
8. MOVING OR SPEAKING SO SLOWLY THAT OTHER PEOPLE COULD HAVE NOTICED. OR THE OPPOSITE, BEING SO FIGETY OR RESTLESS THAT YOU HAVE BEEN MOVING AROUND A LOT MORE THAN USUAL: NOT AT ALL
10. IF YOU CHECKED OFF ANY PROBLEMS, HOW DIFFICULT HAVE THESE PROBLEMS MADE IT FOR YOU TO DO YOUR WORK, TAKE CARE OF THINGS AT HOME, OR GET ALONG WITH OTHER PEOPLE: NOT DIFFICULT AT ALL
2. FEELING DOWN, DEPRESSED OR HOPELESS: SEVERAL DAYS
4. FEELING TIRED OR HAVING LITTLE ENERGY: NEARLY EVERY DAY
6. FEELING BAD ABOUT YOURSELF - OR THAT YOU ARE A FAILURE OR HAVE LET YOURSELF OR YOUR FAMILY DOWN: NOT AT ALL
1. LITTLE INTEREST OR PLEASURE IN DOING THINGS: NOT AT ALL
7. TROUBLE CONCENTRATING ON THINGS, SUCH AS READING THE NEWSPAPER OR WATCHING TELEVISION: NOT AT ALL
SUM OF ALL RESPONSES TO PHQ QUESTIONS 1-9: 4
SUM OF ALL RESPONSES TO PHQ QUESTIONS 1-9: 4

## 2025-05-16 NOTE — PROGRESS NOTES
5/23/2025    Lubna Williamson    Chief Complaint   Patient presents with    3 Month Follow-Up     - no complaints       HPI  Lubna is a 85 y.o. female with a PMHx as listed below who presents today for 3 month follow up anxiety    Doing well on inhalers  Anxiety stable    Mild elevation in blood pressure, she does not check bp much at home    Patient has had 2 falls in the past 2 weeks. She was using walker.     She feels sertraline has been helping with her anxiety  History of Present Illness        1. Multiple falls    2. Age-related physical debility    3. Essential hypertension    4. Primary hypertension    5. Moderate mixed hyperlipidemia not requiring statin therapy             REVIEW OF SYMPTOMS    Review of Systems   Constitutional:  Negative for chills and fatigue.   HENT:  Negative for congestion and sore throat.    Respiratory:  Negative for shortness of breath and wheezing.    Cardiovascular:  Negative for chest pain and palpitations.   Gastrointestinal:  Negative for abdominal pain and nausea.   Genitourinary:  Negative for frequency and urgency.   Neurological:  Negative for light-headedness.       PAST MEDICAL HISTORY  Past Medical History:   Diagnosis Date    Anxiety     Cardiomegaly     Cerebral palsy (HCC)     Chronic bronchitis (HCC)     Compression fracture 2/2013    age of onset unknown    COPD (chronic obstructive pulmonary disease) (HCC)     Depression     Environmental allergies     Essential hypertension, benign 1985    white coat hypertension    Family history of tremor     GERD (gastroesophageal reflux disease)     Hyperlipemia     dx'd 1985    Impaired glucose tolerance     Impaired glucose tolerance     Lichen sclerosus     Mild mitral regurgitation     Mild tricuspid regurgitation     per 7/2014 echo    Moderate aortic insufficiency     Osteoarthritis of lumbar spine     Osteoarthritis of thoracic spine     Osteoporosis     Sleep apnea     Vertigo        FAMILY HISTORY  Family History

## 2025-05-20 ENCOUNTER — RESULTS FOLLOW-UP (OUTPATIENT)
Dept: FAMILY MEDICINE CLINIC | Age: 85
End: 2025-05-20

## 2025-05-20 NOTE — RESULT ENCOUNTER NOTE
Cholesterol mild elevation. It is important she continues zetia. Otherwise continue current regimen. Normal thyroid.

## 2025-05-24 DIAGNOSIS — E78.5 HYPERLIPIDEMIA, UNSPECIFIED HYPERLIPIDEMIA TYPE: ICD-10-CM

## 2025-05-27 DIAGNOSIS — E78.5 HYPERLIPIDEMIA, UNSPECIFIED HYPERLIPIDEMIA TYPE: ICD-10-CM

## 2025-05-27 RX ORDER — EZETIMIBE 10 MG/1
TABLET ORAL
Qty: 90 TABLET | Refills: 1 | Status: SHIPPED | OUTPATIENT
Start: 2025-05-27

## 2025-05-27 RX ORDER — EZETIMIBE 10 MG/1
10 TABLET ORAL DAILY
Qty: 90 TABLET | Refills: 1 | OUTPATIENT
Start: 2025-05-27

## 2025-06-07 DIAGNOSIS — I10 PRIMARY HYPERTENSION: ICD-10-CM

## 2025-06-09 RX ORDER — VALSARTAN AND HYDROCHLOROTHIAZIDE 320; 12.5 MG/1; MG/1
1 TABLET, FILM COATED ORAL DAILY
Qty: 90 TABLET | Refills: 1 | Status: SHIPPED | OUTPATIENT
Start: 2025-06-09

## 2025-07-17 ENCOUNTER — TELEPHONE (OUTPATIENT)
Dept: FAMILY MEDICINE CLINIC | Age: 85
End: 2025-07-17

## 2025-07-17 ENCOUNTER — TELEMEDICINE (OUTPATIENT)
Dept: FAMILY MEDICINE CLINIC | Age: 85
End: 2025-07-17

## 2025-07-17 DIAGNOSIS — F41.9 ANXIETY: ICD-10-CM

## 2025-07-17 DIAGNOSIS — Z00.00 MEDICARE ANNUAL WELLNESS VISIT, SUBSEQUENT: Primary | ICD-10-CM

## 2025-07-17 RX ORDER — LORAZEPAM 0.5 MG/1
0.5 TABLET ORAL EVERY 8 HOURS PRN
Qty: 90 TABLET | Refills: 0 | Status: SHIPPED | OUTPATIENT
Start: 2025-07-17 | End: 2025-08-16

## 2025-07-17 ASSESSMENT — PATIENT HEALTH QUESTIONNAIRE - PHQ9
7. TROUBLE CONCENTRATING ON THINGS, SUCH AS READING THE NEWSPAPER OR WATCHING TELEVISION: NOT AT ALL
2. FEELING DOWN, DEPRESSED OR HOPELESS: NOT AT ALL
3. TROUBLE FALLING OR STAYING ASLEEP: NOT AT ALL
SUM OF ALL RESPONSES TO PHQ QUESTIONS 1-9: 0
6. FEELING BAD ABOUT YOURSELF - OR THAT YOU ARE A FAILURE OR HAVE LET YOURSELF OR YOUR FAMILY DOWN: NOT AT ALL
SUM OF ALL RESPONSES TO PHQ QUESTIONS 1-9: 0
9. THOUGHTS THAT YOU WOULD BE BETTER OFF DEAD, OR OF HURTING YOURSELF: NOT AT ALL
10. IF YOU CHECKED OFF ANY PROBLEMS, HOW DIFFICULT HAVE THESE PROBLEMS MADE IT FOR YOU TO DO YOUR WORK, TAKE CARE OF THINGS AT HOME, OR GET ALONG WITH OTHER PEOPLE: NOT DIFFICULT AT ALL
8. MOVING OR SPEAKING SO SLOWLY THAT OTHER PEOPLE COULD HAVE NOTICED. OR THE OPPOSITE, BEING SO FIGETY OR RESTLESS THAT YOU HAVE BEEN MOVING AROUND A LOT MORE THAN USUAL: NOT AT ALL
SUM OF ALL RESPONSES TO PHQ QUESTIONS 1-9: 0
5. POOR APPETITE OR OVEREATING: NOT AT ALL
4. FEELING TIRED OR HAVING LITTLE ENERGY: NOT AT ALL
1. LITTLE INTEREST OR PLEASURE IN DOING THINGS: NOT AT ALL
SUM OF ALL RESPONSES TO PHQ QUESTIONS 1-9: 0

## 2025-07-17 NOTE — TELEPHONE ENCOUNTER
Patient is requesting a refill for Ativan to be sent to [facility] [address], please.    Order pending. Thank you!

## 2025-07-17 NOTE — PROGRESS NOTES
Medicare Annual Wellness Visit    Lubna Williamson is here for Medicare AWV    Assessment & Plan   Medicare annual wellness visit, subsequent     No follow-ups on file.     Subjective       Patient's complete Health Risk Assessment and screening values have been reviewed and are found in Flowsheets. The following problems were reviewed today and where indicated follow up appointments were made and/or referrals ordered.    Positive Risk Factor Screenings with Interventions:    Fall Risk:  Do you feel unsteady or are you worried about falling? : (!) yes (walker always)  2 or more falls in past year?: (!) yes  Fall with injury in past year?: (!) yes (hit head, went to hosp and on ventilator all night-imaging neg)  Interventions:    Patient comments: patient states she is a fall risk, and had a fall where she hit her head and went to the hospital. She was put on a ventilator all night. She states her imaging was negative. She states she always uses a walker for stability.  Reviewed medications, home hazards, visual acuity, and co-morbidities that can increase risk for falls  Patient declines any further evaluation or treatment                 Hearing Screen:  Do you or your family notice any trouble with your hearing that hasn't been managed with hearing aids?: (!) Yes (states \"it's faded\" no ref, not that bad yet)    Interventions:  Patient comments: patient states her hearing  has \"faded\", but declines referral, stating it's \"not that bad yet\".  Patient declines any further evaluation or treatment    Vision Screen:  Do you have difficulty driving, watching TV, or doing any of your daily activities because of your eyesight?: No  Have you had an eye exam within the past year?: (!) No (needs)  Interventions:   Patient encouraged to make appointment with their eye specialist      ADL's:   Patient reports needing help with:  Select all that apply: (!) Bathing (spouse helps her in and out of the tub)  Select all that apply:

## 2025-07-17 NOTE — PATIENT INSTRUCTIONS
Personalized Preventive Plan for Lubna Williamson - 7/17/2025  Medicare offers a range of preventive health benefits. Some of the tests and screenings are paid in full while other may be subject to a deductible, co-insurance, and/or copay.  Some of these benefits include a comprehensive review of your medical history including lifestyle, illnesses that may run in your family, and various assessments and screenings as appropriate.  After reviewing your medical record and screening and assessments performed today your provider may have ordered immunizations, labs, imaging, and/or referrals for you.  A list of these orders (if applicable) as well as your Preventive Care list are included within your After Visit Summary for your review.

## 2025-07-27 DIAGNOSIS — F41.9 ANXIETY: ICD-10-CM

## 2025-07-29 RX ORDER — SERTRALINE HYDROCHLORIDE 100 MG/1
100 TABLET, FILM COATED ORAL DAILY
Qty: 90 TABLET | Refills: 1 | Status: SHIPPED | OUTPATIENT
Start: 2025-07-29

## 2025-08-18 ENCOUNTER — OFFICE VISIT (OUTPATIENT)
Dept: FAMILY MEDICINE CLINIC | Age: 85
End: 2025-08-18
Payer: MEDICARE

## 2025-08-18 VITALS
OXYGEN SATURATION: 97 % | HEART RATE: 50 BPM | SYSTOLIC BLOOD PRESSURE: 130 MMHG | HEIGHT: 63 IN | WEIGHT: 152.4 LBS | BODY MASS INDEX: 27 KG/M2 | DIASTOLIC BLOOD PRESSURE: 72 MMHG

## 2025-08-18 DIAGNOSIS — F41.9 ANXIETY: ICD-10-CM

## 2025-08-18 DIAGNOSIS — R73.02 IMPAIRED GLUCOSE TOLERANCE: Primary | ICD-10-CM

## 2025-08-18 DIAGNOSIS — I10 ESSENTIAL HYPERTENSION: ICD-10-CM

## 2025-08-18 DIAGNOSIS — J01.90 ACUTE SINUSITIS WITH SYMPTOMS > 10 DAYS: ICD-10-CM

## 2025-08-18 LAB — HBA1C MFR BLD: 5 %

## 2025-08-18 PROCEDURE — G8427 DOCREV CUR MEDS BY ELIG CLIN: HCPCS | Performed by: STUDENT IN AN ORGANIZED HEALTH CARE EDUCATION/TRAINING PROGRAM

## 2025-08-18 PROCEDURE — 3078F DIAST BP <80 MM HG: CPT | Performed by: STUDENT IN AN ORGANIZED HEALTH CARE EDUCATION/TRAINING PROGRAM

## 2025-08-18 PROCEDURE — 99214 OFFICE O/P EST MOD 30 MIN: CPT | Performed by: STUDENT IN AN ORGANIZED HEALTH CARE EDUCATION/TRAINING PROGRAM

## 2025-08-18 PROCEDURE — 3075F SYST BP GE 130 - 139MM HG: CPT | Performed by: STUDENT IN AN ORGANIZED HEALTH CARE EDUCATION/TRAINING PROGRAM

## 2025-08-18 PROCEDURE — 1123F ACP DISCUSS/DSCN MKR DOCD: CPT | Performed by: STUDENT IN AN ORGANIZED HEALTH CARE EDUCATION/TRAINING PROGRAM

## 2025-08-18 PROCEDURE — 1159F MED LIST DOCD IN RCRD: CPT | Performed by: STUDENT IN AN ORGANIZED HEALTH CARE EDUCATION/TRAINING PROGRAM

## 2025-08-18 PROCEDURE — 1090F PRES/ABSN URINE INCON ASSESS: CPT | Performed by: STUDENT IN AN ORGANIZED HEALTH CARE EDUCATION/TRAINING PROGRAM

## 2025-08-18 PROCEDURE — G8417 CALC BMI ABV UP PARAM F/U: HCPCS | Performed by: STUDENT IN AN ORGANIZED HEALTH CARE EDUCATION/TRAINING PROGRAM

## 2025-08-18 PROCEDURE — G8399 PT W/DXA RESULTS DOCUMENT: HCPCS | Performed by: STUDENT IN AN ORGANIZED HEALTH CARE EDUCATION/TRAINING PROGRAM

## 2025-08-18 PROCEDURE — 1036F TOBACCO NON-USER: CPT | Performed by: STUDENT IN AN ORGANIZED HEALTH CARE EDUCATION/TRAINING PROGRAM

## 2025-08-18 PROCEDURE — 83036 HEMOGLOBIN GLYCOSYLATED A1C: CPT | Performed by: STUDENT IN AN ORGANIZED HEALTH CARE EDUCATION/TRAINING PROGRAM

## 2025-08-18 RX ORDER — LORAZEPAM 0.5 MG/1
0.5 TABLET ORAL 3 TIMES DAILY PRN
Qty: 90 TABLET | Refills: 0 | Status: SHIPPED | OUTPATIENT
Start: 2025-08-18 | End: 2025-09-17

## 2025-08-18 RX ORDER — AZITHROMYCIN 250 MG/1
TABLET, FILM COATED ORAL
Qty: 6 TABLET | Refills: 0 | Status: SHIPPED | OUTPATIENT
Start: 2025-08-18 | End: 2025-08-28

## 2025-08-25 ASSESSMENT — ENCOUNTER SYMPTOMS
SHORTNESS OF BREATH: 0
ABDOMINAL PAIN: 0
WHEEZING: 0
NAUSEA: 0
SORE THROAT: 0